# Patient Record
Sex: FEMALE | Race: WHITE | NOT HISPANIC OR LATINO | Employment: UNEMPLOYED | URBAN - METROPOLITAN AREA
[De-identification: names, ages, dates, MRNs, and addresses within clinical notes are randomized per-mention and may not be internally consistent; named-entity substitution may affect disease eponyms.]

---

## 2022-06-07 ENCOUNTER — OFFICE VISIT (OUTPATIENT)
Dept: FAMILY MEDICINE | Facility: CLINIC | Age: 27
End: 2022-06-07
Payer: OTHER GOVERNMENT

## 2022-06-07 VITALS
WEIGHT: 197 LBS | OXYGEN SATURATION: 98 % | HEIGHT: 62 IN | DIASTOLIC BLOOD PRESSURE: 58 MMHG | HEART RATE: 82 BPM | BODY MASS INDEX: 36.25 KG/M2 | SYSTOLIC BLOOD PRESSURE: 88 MMHG

## 2022-06-07 DIAGNOSIS — K21.9 GASTROESOPHAGEAL REFLUX DISEASE WITHOUT ESOPHAGITIS: ICD-10-CM

## 2022-06-07 DIAGNOSIS — Z90.49 S/P SMALL BOWEL RESECTION: ICD-10-CM

## 2022-06-07 DIAGNOSIS — K50.80 CROHN'S DISEASE OF BOTH SMALL AND LARGE INTESTINE WITHOUT COMPLICATION: Primary | ICD-10-CM

## 2022-06-07 DIAGNOSIS — F06.4 ANXIETY DISORDER DUE TO GENERAL MEDICAL CONDITION: ICD-10-CM

## 2022-06-07 DIAGNOSIS — N80.9 ENDOMETRIOSIS, UNSPECIFIED: ICD-10-CM

## 2022-06-07 PROBLEM — K50.90 CROHN'S DISEASE, UNSPECIFIED, WITHOUT COMPLICATIONS: Status: ACTIVE | Noted: 2022-06-07

## 2022-06-07 PROCEDURE — 99203 OFFICE O/P NEW LOW 30 MIN: CPT | Mod: S$GLB,,, | Performed by: FAMILY MEDICINE

## 2022-06-07 PROCEDURE — 99203 PR OFFICE/OUTPT VISIT, NEW, LEVL III, 30-44 MIN: ICD-10-PCS | Mod: S$GLB,,, | Performed by: FAMILY MEDICINE

## 2022-06-07 RX ORDER — SERTRALINE HYDROCHLORIDE 100 MG/1
100 TABLET, FILM COATED ORAL DAILY
Qty: 90 TABLET | Refills: 1 | Status: SHIPPED | OUTPATIENT
Start: 2022-06-07 | End: 2022-12-08

## 2022-06-07 RX ORDER — CALC/MAG/B COMPLEX/D3/HERB 61
15 TABLET ORAL DAILY
Qty: 90 CAPSULE | Refills: 1 | Status: SHIPPED | OUTPATIENT
Start: 2022-06-07 | End: 2022-06-13

## 2022-06-07 RX ORDER — KETOROLAC TROMETHAMINE 10 MG/1
10 TABLET, FILM COATED ORAL EVERY 6 HOURS
Qty: 20 TABLET | Refills: 0 | Status: SHIPPED | OUTPATIENT
Start: 2022-06-07 | End: 2022-06-12

## 2022-06-07 NOTE — PROGRESS NOTES
SUBJECTIVE:    Patient ID: Agnes Matos is a 27 y.o. female.    Chief Complaint: Establish Care, needs colonoscopy / recent resection, discuss returning endometriosis, and requests psych referral for continued care    Patient with h/o Chrons disease. She has allergic reaction to the biologicals to treat this. She has had colon resection 2022 for this. Also has allergy to Morphine. Nsaids help her pain . Transitioning out of active duty , she is medically retired and has just moved to [place]. She needs to establish with a new GI.   She does also have issues with endometriosis . Feels like she has been having flares. Has wanted hysterectomy but her gyn wouldn't perform due to her age   Has been having pain recently. She reports has 3/10 pain currently. She has had multiple intraabdominal surgeries. Has been having some mild changes in stools. Flares cause changes in appetite. She is able to tolerate liquids       Past Medical History:   Diagnosis Date    Anxiety     Cholestasis during pregnancy     Crohn's colitis     Depression     Endometriosis     GERD (gastroesophageal reflux disease)     IBS (irritable bowel syndrome)     TMJ (dislocation of temporomandibular joint)      Past Surgical History:   Procedure Laterality Date     SECTION  17 &     Two births    COLON SURGERY  21    A small part along with tbe small bowel    presacral neurectomy          SMALL INTESTINE SURGERY  2021    partial with partial large bowel    SURGICAL REMOVAL OF ENDOMETRIOSIS      TONSILLECTOMY  2006    TUBAL LIGATION  10-26-19    After c-secrtion     History reviewed. No pertinent family history.    Marital Status:   Alcohol History:  reports that she does not currently use alcohol.  Tobacco History:  reports that she has never smoked. She has never used smokeless tobacco.  Drug History:  reports no history of drug use.    Review of patient's allergies indicates:   Allergen Reactions  "   Opioids - morphine analogues Itching    Stelara [ustekinumab] Hives    Adalimumab Rash, Itching and Hives     joint ache      Renflexis [infliximab-abda] Rash     Other reaction(s): Rash or Itch, Other: joint ache, Rash or Itch, Other: joint ache       Current Outpatient Medications:     omeprazole (PRILOSEC) 20 MG capsule, Take 1 capsule (20 mg total) by mouth once daily., Disp: 30 capsule, Rfl: 11    sertraline (ZOLOFT) 100 MG tablet, Take 1 tablet (100 mg total) by mouth once daily. For mood, Disp: 90 tablet, Rfl: 1    Review of Systems   Constitutional:  Positive for appetite change. Negative for activity change, fatigue and unexpected weight change.   HENT:  Negative for hearing loss, postnasal drip, sinus pressure, sore throat and voice change.    Eyes:  Negative for photophobia and visual disturbance.   Respiratory:  Negative for cough, shortness of breath and wheezing.    Cardiovascular:  Negative for chest pain and palpitations.   Gastrointestinal:  Positive for abdominal pain and constipation. Negative for blood in stool, diarrhea and nausea.   Genitourinary:  Negative for difficulty urinating, frequency, hematuria and urgency.   Musculoskeletal:  Negative for arthralgias and back pain.   Skin:  Negative for rash.   Neurological:  Negative for weakness, light-headedness and headaches.   Hematological:  Negative for adenopathy. Does not bruise/bleed easily.   Psychiatric/Behavioral:  The patient is not nervous/anxious.         Objective:      Vitals:    06/07/22 1143   BP: (!) 88/58   Pulse: 82   SpO2: 98%   Weight: 89.4 kg (197 lb)   Height: 5' 2" (1.575 m)     Physical Exam  Constitutional:       Appearance: Normal appearance.   HENT:      Head: Normocephalic and atraumatic.      Mouth/Throat:      Mouth: Mucous membranes are moist.   Eyes:      Conjunctiva/sclera: Conjunctivae normal.   Pulmonary:      Effort: Pulmonary effort is normal.   Abdominal:      General: Abdomen is flat. There is no " distension.      Palpations: Abdomen is soft.      Tenderness: There is abdominal tenderness. There is no guarding.   Neurological:      General: No focal deficit present.      Mental Status: She is alert and oriented to person, place, and time.   Psychiatric:         Mood and Affect: Mood normal.         Behavior: Behavior normal.         Assessment:       1. Crohn's disease of both small and large intestine without complication    2. Endometriosis, unspecified    3. Anxiety disorder due to general medical condition    4. Gastroesophageal reflux disease without esophagitis    5. S/P small bowel resection         Plan:       Crohn's disease of both small and large intestine without complication  -     Ambulatory referral/consult to Gastroenterology; Future; Expected date: 06/14/2022  -     ketorolac (TORADOL) 10 mg tablet; Take 1 tablet (10 mg total) by mouth every 6 (six) hours. For acute pain for 5 days  Dispense: 20 tablet; Refill: 0  -     CBC Auto Differential; Future; Expected date: 06/07/2022  -     Comprehensive Metabolic Panel; Future; Expected date: 06/07/2022  -     Iron; Future; Expected date: 06/07/2022    Endometriosis, unspecified  -     Ambulatory referral/consult to Obstetrics / Gynecology; Future; Expected date: 06/14/2022    Anxiety disorder due to general medical condition  -     sertraline (ZOLOFT) 100 MG tablet; Take 1 tablet (100 mg total) by mouth once daily. For mood  Dispense: 90 tablet; Refill: 1  -     Ambulatory referral/consult to Psychology; Future; Expected date: 06/14/2022    Gastroesophageal reflux disease without esophagitis  -     Discontinue: lansoprazole (PREVACID) 15 MG capsule; Take 1 capsule (15 mg total) by mouth once daily. For gerd  Dispense: 90 capsule; Refill: 1    S/P small bowel resection  -     CBC Auto Differential; Future; Expected date: 06/07/2022  -     Comprehensive Metabolic Panel; Future; Expected date: 06/07/2022  -     Iron; Future; Expected date:  06/07/2022    Follow up in about 6 months (around 12/7/2022), or if symptoms worsen or fail to improve.

## 2022-06-08 LAB
ALBUMIN SERPL-MCNC: 4.6 G/DL (ref 3.6–5.1)
ALBUMIN/GLOB SERPL: 1.4 (CALC) (ref 1–2.5)
ALP SERPL-CCNC: 69 U/L (ref 31–125)
ALT SERPL-CCNC: 23 U/L (ref 6–29)
AST SERPL-CCNC: 19 U/L (ref 10–30)
BASOPHILS # BLD AUTO: 28 CELLS/UL (ref 0–200)
BASOPHILS NFR BLD AUTO: 0.4 %
BILIRUB SERPL-MCNC: 0.6 MG/DL (ref 0.2–1.2)
BUN SERPL-MCNC: 16 MG/DL (ref 7–25)
BUN/CREAT SERPL: NORMAL (CALC) (ref 6–22)
CALCIUM SERPL-MCNC: 9.8 MG/DL (ref 8.6–10.2)
CHLORIDE SERPL-SCNC: 101 MMOL/L (ref 98–110)
CO2 SERPL-SCNC: 27 MMOL/L (ref 20–32)
CREAT SERPL-MCNC: 0.76 MG/DL (ref 0.5–1.1)
EOSINOPHIL # BLD AUTO: 57 CELLS/UL (ref 15–500)
EOSINOPHIL NFR BLD AUTO: 0.8 %
ERYTHROCYTE [DISTWIDTH] IN BLOOD BY AUTOMATED COUNT: 12.8 % (ref 11–15)
GLOBULIN SER CALC-MCNC: 3.3 G/DL (CALC) (ref 1.9–3.7)
GLUCOSE SERPL-MCNC: 81 MG/DL (ref 65–99)
HCT VFR BLD AUTO: 40.6 % (ref 35–45)
HGB BLD-MCNC: 13.3 G/DL (ref 11.7–15.5)
IRON SERPL-MCNC: 91 MCG/DL (ref 40–190)
LYMPHOCYTES # BLD AUTO: 2435 CELLS/UL (ref 850–3900)
LYMPHOCYTES NFR BLD AUTO: 34.3 %
MCH RBC QN AUTO: 25.9 PG (ref 27–33)
MCHC RBC AUTO-ENTMCNC: 32.8 G/DL (ref 32–36)
MCV RBC AUTO: 79 FL (ref 80–100)
MONOCYTES # BLD AUTO: 454 CELLS/UL (ref 200–950)
MONOCYTES NFR BLD AUTO: 6.4 %
NEUTROPHILS # BLD AUTO: 4125 CELLS/UL (ref 1500–7800)
NEUTROPHILS NFR BLD AUTO: 58.1 %
PLATELET # BLD AUTO: 325 THOUSAND/UL (ref 140–400)
PMV BLD REES-ECKER: 9.1 FL (ref 7.5–12.5)
POTASSIUM SERPL-SCNC: 3.7 MMOL/L (ref 3.5–5.3)
PROT SERPL-MCNC: 7.9 G/DL (ref 6.1–8.1)
RBC # BLD AUTO: 5.14 MILLION/UL (ref 3.8–5.1)
SODIUM SERPL-SCNC: 137 MMOL/L (ref 135–146)
WBC # BLD AUTO: 7.1 THOUSAND/UL (ref 3.8–10.8)

## 2022-06-13 RX ORDER — OMEPRAZOLE 20 MG/1
20 CAPSULE, DELAYED RELEASE ORAL DAILY
Qty: 30 CAPSULE | Refills: 11 | Status: SHIPPED | OUTPATIENT
Start: 2022-06-13 | End: 2022-12-08

## 2022-06-20 ENCOUNTER — TELEPHONE (OUTPATIENT)
Dept: FAMILY MEDICINE | Facility: CLINIC | Age: 27
End: 2022-06-20

## 2022-06-20 DIAGNOSIS — K50.80 CROHN'S DISEASE OF BOTH SMALL AND LARGE INTESTINE WITHOUT COMPLICATION: Primary | ICD-10-CM

## 2022-06-20 NOTE — TELEPHONE ENCOUNTER
----- Message from Zoey Del Castillo sent at 6/20/2022  9:38 AM CDT -----  Dr. aSnchez is not accepting new patients. They need a new referral to Dr. Lee instead. Pt #[phone]

## 2022-12-08 ENCOUNTER — OFFICE VISIT (OUTPATIENT)
Dept: FAMILY MEDICINE | Facility: CLINIC | Age: 27
End: 2022-12-08
Payer: OTHER GOVERNMENT

## 2022-12-08 VITALS
SYSTOLIC BLOOD PRESSURE: 100 MMHG | DIASTOLIC BLOOD PRESSURE: 66 MMHG | OXYGEN SATURATION: 99 % | HEART RATE: 89 BPM | BODY MASS INDEX: 39.93 KG/M2 | WEIGHT: 217 LBS | HEIGHT: 62 IN

## 2022-12-08 DIAGNOSIS — Z3A.11 11 WEEKS GESTATION OF PREGNANCY: ICD-10-CM

## 2022-12-08 DIAGNOSIS — F06.4 ANXIETY DISORDER DUE TO GENERAL MEDICAL CONDITION: Primary | ICD-10-CM

## 2022-12-08 PROCEDURE — 99213 OFFICE O/P EST LOW 20 MIN: CPT | Mod: S$GLB,,, | Performed by: FAMILY MEDICINE

## 2022-12-08 PROCEDURE — 99213 PR OFFICE/OUTPT VISIT, EST, LEVL III, 20-29 MIN: ICD-10-PCS | Mod: S$GLB,,, | Performed by: FAMILY MEDICINE

## 2022-12-08 RX ORDER — SERTRALINE HYDROCHLORIDE 100 MG/1
100 TABLET, FILM COATED ORAL DAILY
Qty: 90 TABLET | Refills: 1 | Status: CANCELLED | OUTPATIENT
Start: 2022-12-08 | End: 2023-12-08

## 2022-12-08 NOTE — PROGRESS NOTES
SUBJECTIVE:    Patient ID: Agnes Matos is a 27 y.o. female.    Chief Complaint: 6M Check Up and 11 weeks pregnant      Patient with history of IBS and depressive disorder presents for her follow-up visit.  She has been on sertraline off and on for the past few months.  She recently found out that she is pregnant.  She is currently 11 weeks.  She has only been intermittently taking her sertraline.  She feels that she is in a good space mentally.  She has an appointment with her gyn on next week.  No GI flares.  She is already started taking prenatal vitamins.      Past Medical History:   Diagnosis Date    Anxiety     Cholestasis during pregnancy     Crohn's colitis     Depression     Endometriosis     GERD (gastroesophageal reflux disease)     IBS (irritable bowel syndrome)     TMJ (dislocation of temporomandibular joint)      Past Surgical History:   Procedure Laterality Date     SECTION  17 &     Two births    COLON SURGERY  21    A small part along with tbe small bowel    presacral neurectomy          SMALL INTESTINE SURGERY  2021    partial with partial large bowel    SURGICAL REMOVAL OF ENDOMETRIOSIS      TONSILLECTOMY      TUBAL LIGATION  10-26-19    After c-secrtion     History reviewed. No pertinent family history.    Marital Status:   Alcohol History:  reports that she does not currently use alcohol.  Tobacco History:  reports that she has never smoked. She has never used smokeless tobacco.  Drug History:  reports no history of drug use.    Review of patient's allergies indicates:   Allergen Reactions    Opioids - morphine analogues Itching    Stelara [ustekinumab] Hives    Adalimumab Rash, Itching and Hives     joint ache      Renflexis [infliximab-abda] Rash     Other reaction(s): Rash or Itch, Other: joint ache, Rash or Itch, Other: joint ache       Current Outpatient Medications:     prenatal 25/iron fum/folic/dha (PRENATAL-1 ORAL), Take by mouth Daily.,  "Disp: , Rfl:     Review of Systems   All other systems reviewed and are negative.       Objective:      Vitals:    12/08/22 1544   BP: 100/66   Pulse: 89   SpO2: 99%   Weight: 98.4 kg (217 lb)   Height: 5' 2" (1.575 m)     Physical Exam  Constitutional:       Appearance: Normal appearance.   HENT:      Head: Normocephalic and atraumatic.      Mouth/Throat:      Mouth: Mucous membranes are moist.   Eyes:      Conjunctiva/sclera: Conjunctivae normal.   Pulmonary:      Effort: Pulmonary effort is normal.   Neurological:      General: No focal deficit present.      Mental Status: She is alert and oriented to person, place, and time.   Psychiatric:         Mood and Affect: Mood normal.         Behavior: Behavior normal.         Assessment:       1. Anxiety disorder due to general medical condition    2. 11 weeks gestation of pregnancy           Plan:       Anxiety disorder due to general medical condition    11 weeks gestation of pregnancy    Patient to discontinue sertraline and Prilosec    Follow up if symptoms worsen or fail to improve.              "

## 2022-12-14 ENCOUNTER — HOSPITAL ENCOUNTER (EMERGENCY)
Facility: HOSPITAL | Age: 27
Discharge: ANOTHER HOSPITAL | End: 2022-12-15
Attending: EMERGENCY MEDICINE
Payer: OTHER GOVERNMENT

## 2022-12-14 DIAGNOSIS — N83.202 CYST OF LEFT OVARY: Primary | ICD-10-CM

## 2022-12-14 DIAGNOSIS — R10.9 FLANK PAIN: ICD-10-CM

## 2022-12-14 LAB
ALBUMIN SERPL BCP-MCNC: 3.6 G/DL (ref 3.5–5.2)
ALP SERPL-CCNC: 78 U/L (ref 55–135)
ALT SERPL W/O P-5'-P-CCNC: 34 U/L (ref 10–44)
ANION GAP SERPL CALC-SCNC: 12 MMOL/L (ref 8–16)
AST SERPL-CCNC: 18 U/L (ref 10–40)
B-HCG UR QL: POSITIVE
BASOPHILS # BLD AUTO: 0.02 K/UL (ref 0–0.2)
BASOPHILS NFR BLD: 0.1 % (ref 0–1.9)
BILIRUB SERPL-MCNC: 0.4 MG/DL (ref 0.1–1)
BILIRUB UR QL STRIP: NEGATIVE
BUN SERPL-MCNC: 8 MG/DL (ref 6–20)
CALCIUM SERPL-MCNC: 9.2 MG/DL (ref 8.7–10.5)
CHLORIDE SERPL-SCNC: 103 MMOL/L (ref 95–110)
CLARITY UR: CLEAR
CO2 SERPL-SCNC: 20 MMOL/L (ref 23–29)
COLOR UR: YELLOW
CREAT SERPL-MCNC: 0.6 MG/DL (ref 0.5–1.4)
DIFFERENTIAL METHOD: ABNORMAL
EOSINOPHIL # BLD AUTO: 0.1 K/UL (ref 0–0.5)
EOSINOPHIL NFR BLD: 0.4 % (ref 0–8)
ERYTHROCYTE [DISTWIDTH] IN BLOOD BY AUTOMATED COUNT: 13.2 % (ref 11.5–14.5)
EST. GFR  (NO RACE VARIABLE): >60 ML/MIN/1.73 M^2
GLUCOSE SERPL-MCNC: 96 MG/DL (ref 70–110)
GLUCOSE UR QL STRIP: NEGATIVE
HCG INTACT+B SERPL-ACNC: NORMAL MIU/ML
HCT VFR BLD AUTO: 38.8 % (ref 37–48.5)
HGB BLD-MCNC: 12.9 G/DL (ref 12–16)
HGB UR QL STRIP: NEGATIVE
IMM GRANULOCYTES # BLD AUTO: 0.08 K/UL (ref 0–0.04)
IMM GRANULOCYTES NFR BLD AUTO: 0.5 % (ref 0–0.5)
KETONES UR QL STRIP: NEGATIVE
LEUKOCYTE ESTERASE UR QL STRIP: NEGATIVE
LYMPHOCYTES # BLD AUTO: 2.1 K/UL (ref 1–4.8)
LYMPHOCYTES NFR BLD: 14.2 % (ref 18–48)
MCH RBC QN AUTO: 26.5 PG (ref 27–31)
MCHC RBC AUTO-ENTMCNC: 33.2 G/DL (ref 32–36)
MCV RBC AUTO: 80 FL (ref 82–98)
MONOCYTES # BLD AUTO: 0.7 K/UL (ref 0.3–1)
MONOCYTES NFR BLD: 4.7 % (ref 4–15)
NEUTROPHILS # BLD AUTO: 11.9 K/UL (ref 1.8–7.7)
NEUTROPHILS NFR BLD: 80.1 % (ref 38–73)
NITRITE UR QL STRIP: NEGATIVE
NRBC BLD-RTO: 0 /100 WBC
PH UR STRIP: 6 [PH] (ref 5–8)
PLATELET # BLD AUTO: 318 K/UL (ref 150–450)
PMV BLD AUTO: 8.6 FL (ref 9.2–12.9)
POTASSIUM SERPL-SCNC: 3.9 MMOL/L (ref 3.5–5.1)
PROT SERPL-MCNC: 8 G/DL (ref 6–8.4)
PROT UR QL STRIP: NEGATIVE
RBC # BLD AUTO: 4.87 M/UL (ref 4–5.4)
SODIUM SERPL-SCNC: 135 MMOL/L (ref 136–145)
SP GR UR STRIP: 1.02 (ref 1–1.03)
URN SPEC COLLECT METH UR: NORMAL
UROBILINOGEN UR STRIP-ACNC: NEGATIVE EU/DL
WBC # BLD AUTO: 14.82 K/UL (ref 3.9–12.7)

## 2022-12-14 PROCEDURE — 63600175 PHARM REV CODE 636 W HCPCS: Performed by: EMERGENCY MEDICINE

## 2022-12-14 PROCEDURE — 36415 COLL VENOUS BLD VENIPUNCTURE: CPT | Performed by: EMERGENCY MEDICINE

## 2022-12-14 PROCEDURE — 81025 URINE PREGNANCY TEST: CPT | Performed by: EMERGENCY MEDICINE

## 2022-12-14 PROCEDURE — 99285 EMERGENCY DEPT VISIT HI MDM: CPT | Mod: 25

## 2022-12-14 PROCEDURE — 80053 COMPREHEN METABOLIC PANEL: CPT | Performed by: EMERGENCY MEDICINE

## 2022-12-14 PROCEDURE — 84702 CHORIONIC GONADOTROPIN TEST: CPT | Performed by: EMERGENCY MEDICINE

## 2022-12-14 PROCEDURE — U0002 COVID-19 LAB TEST NON-CDC: HCPCS | Performed by: EMERGENCY MEDICINE

## 2022-12-14 PROCEDURE — 81003 URINALYSIS AUTO W/O SCOPE: CPT | Performed by: EMERGENCY MEDICINE

## 2022-12-14 PROCEDURE — 85025 COMPLETE CBC W/AUTO DIFF WBC: CPT | Performed by: EMERGENCY MEDICINE

## 2022-12-14 PROCEDURE — 96374 THER/PROPH/DIAG INJ IV PUSH: CPT

## 2022-12-14 RX ORDER — FENTANYL CITRATE 50 UG/ML
50 INJECTION, SOLUTION INTRAMUSCULAR; INTRAVENOUS
Status: COMPLETED | OUTPATIENT
Start: 2022-12-14 | End: 2022-12-14

## 2022-12-14 RX ADMIN — FENTANYL CITRATE 50 MCG: 50 INJECTION, SOLUTION INTRAMUSCULAR; INTRAVENOUS at 11:12

## 2022-12-15 ENCOUNTER — TELEPHONE (OUTPATIENT)
Dept: FAMILY MEDICINE | Facility: CLINIC | Age: 27
End: 2022-12-15

## 2022-12-15 VITALS
HEIGHT: 63 IN | OXYGEN SATURATION: 100 % | WEIGHT: 214 LBS | TEMPERATURE: 98 F | DIASTOLIC BLOOD PRESSURE: 62 MMHG | BODY MASS INDEX: 37.92 KG/M2 | HEART RATE: 95 BPM | SYSTOLIC BLOOD PRESSURE: 112 MMHG | RESPIRATION RATE: 18 BRPM

## 2022-12-15 PROBLEM — N83.202 LEFT OVARIAN CYST: Status: ACTIVE | Noted: 2022-12-15

## 2022-12-15 PROBLEM — Z3A.11 11 WEEKS GESTATION OF PREGNANCY: Status: ACTIVE | Noted: 2022-12-15

## 2022-12-15 LAB — SARS-COV-2 RDRP RESP QL NAA+PROBE: NEGATIVE

## 2022-12-15 PROCEDURE — 96376 TX/PRO/DX INJ SAME DRUG ADON: CPT

## 2022-12-15 PROCEDURE — 63600175 PHARM REV CODE 636 W HCPCS

## 2022-12-15 RX ORDER — FENTANYL CITRATE 50 UG/ML
INJECTION, SOLUTION INTRAMUSCULAR; INTRAVENOUS
Status: COMPLETED
Start: 2022-12-15 | End: 2022-12-15

## 2022-12-15 RX ORDER — FENTANYL CITRATE 50 UG/ML
50 INJECTION, SOLUTION INTRAMUSCULAR; INTRAVENOUS
Status: COMPLETED | OUTPATIENT
Start: 2022-12-15 | End: 2022-12-15

## 2022-12-15 RX ADMIN — FENTANYL CITRATE 50 MCG: 50 INJECTION, SOLUTION INTRAMUSCULAR; INTRAVENOUS at 12:12

## 2022-12-15 NOTE — ED PROVIDER NOTES
Encounter Date: 2022    SCRIBE #1 NOTE: I, Mandy Carolina, am scribing for, and in the presence of,  Arturo Knutson MD.     History     Chief Complaint   Patient presents with    Flank Pain     Left sided flank pain started 20 minutes pta       Time seen by provider: 10:33 PM on 2022    Agnes Matos is a 27 y.o. female A0 who presents to the ED at ~11 weeks gestation with an onset of left flank pain that began 20 minutes PTA. Patient was lying in bed when significant other shifted, at which time, patient felt sudden sharp pain in left flank. Pain has persisted since radiating to left pelvis and worsening with movement. She denies prior episode of similar pain and states pain is different from prior round ligament pain she has had with other pregnancies. The patient denies burning or pain with urination, N/V, vaginal bleeding or discharge, or any other symptoms at this time. Last confirmatory US was normal. She has 12 week prenatal appointment next week (22). PMHx of Crohn's colitis, IBS, GERD, endometriosis. PSHx of C section, colon surgery, small intestine surgery, tubal ligation, surgical removal of endometriosis, and presacral neurectomy.    The history is provided by the patient.   Review of patient's allergies indicates:   Allergen Reactions    Opioids - morphine analogues Itching    Stelara [ustekinumab] Hives    Adalimumab Rash, Itching and Hives     joint ache      Renflexis [infliximab-abda] Rash     Other reaction(s): Rash or Itch, Other: joint ache, Rash or Itch, Other: joint ache     Past Medical History:   Diagnosis Date    Anxiety     Cholestasis during pregnancy     Crohn's colitis     Depression     Endometriosis     GERD (gastroesophageal reflux disease)     IBS (irritable bowel syndrome)     TMJ (dislocation of temporomandibular joint)      Past Surgical History:   Procedure Laterality Date     SECTION  17 &     Two births    COLON SURGERY  21    A small  part along with tbe small bowel    presacral neurectomy      2020    SMALL INTESTINE SURGERY  09/21/2021    partial with partial large bowel    SURGICAL REMOVAL OF ENDOMETRIOSIS      TONSILLECTOMY  2006    TUBAL LIGATION  10-26-19    After c-secrtion     History reviewed. No pertinent family history.  Social History     Tobacco Use    Smoking status: Never    Smokeless tobacco: Never   Substance Use Topics    Alcohol use: Not Currently     Comment: Social once every six months    Drug use: Never     Review of Systems   Constitutional:  Negative for appetite change.   HENT:  Negative for facial swelling.    Eyes:  Negative for itching.   Respiratory:  Negative for apnea and stridor.    Gastrointestinal:  Negative for abdominal distention, nausea and vomiting.   Genitourinary:  Positive for flank pain and pelvic pain. Negative for dysuria and enuresis.   Musculoskeletal:  Negative for neck stiffness.   Skin:  Negative for color change.   Neurological:  Negative for tremors.   Hematological:  Does not bruise/bleed easily.   Psychiatric/Behavioral:  Negative for decreased concentration.    All other systems reviewed and are negative.    Physical Exam     Initial Vitals [12/14/22 2209]   BP Pulse Resp Temp SpO2   (!) 92/51 99 18 98.4 °F (36.9 °C) 99 %      MAP       --         Physical Exam    Nursing note and vitals reviewed.  Constitutional: No distress.   HENT:   Head: Normocephalic.   Nose: Nose normal.   Eyes: Right eye exhibits no discharge. Left eye exhibits no discharge.   Cardiovascular:  Normal rate.           Pulmonary/Chest: No stridor. No respiratory distress.   Abdominal: She exhibits no distension.   No CVA tenderness, severe tenderness to palpation with guarding to left lower quadrant     Neurological: She is alert.   Skin: Skin is warm and dry.   Psychiatric: She has a normal mood and affect.       ED Course   Procedures  Labs Reviewed   URINALYSIS   CBC W/ AUTO DIFFERENTIAL   COMPREHENSIVE METABOLIC  PANEL   HCG, QUANTITATIVE   POCT URINE PREGNANCY          Imaging Results              US Retroperitoneal Complete (In process)    Procedure changed from US Retroperitoneal Limited                    Medications   fentaNYL 50 mcg/mL injection 50 mcg (has no administration in time range)     Medical Decision Making:   History:   Old Medical Records: I decided to obtain old medical records.  Initial Assessment:   A/P:  Left ovary enlarged without evidence of arterial flow, free fluid in the pelvis, possible fluid collection inferior to placenta concerning for placental abruption versus placenta previa.  Plan for screening labs, pain control, transfer to [place] as this is where her obstetrician practices.  Clinical Tests:   Lab Tests: Reviewed and Ordered  Radiological Study: Ordered and Reviewed        Scribe Attestation:   Scribe #1: I performed the above scribed service and the documentation accurately describes the services I performed. I attest to the accuracy of the note.            I, Dr. Arturo Knutson, personally performed the services described in this documentation. All medical record entries made by the scribe were at my direction and in my presence.  I have reviewed the chart and agree that the record reflects my personal performance and is accurate and complete. Arturo Knutson MD.  12:09 AM 12/15/2022         Clinical Impression:   Final diagnoses:  [R10.9] Flank pain               Arturo Knutson MD  12/15/22 0010

## 2022-12-15 NOTE — TELEPHONE ENCOUNTER
----- Message from Ifeoma Benson MA sent at 12/15/2022  3:32 PM CST -----  Regarding: hfu appt needed  Lizabeth from [facility] calling to schedule a hfu for the pt because the pt is discharging today. # [phone]

## 2022-12-15 NOTE — TELEPHONE ENCOUNTER
Spoke with patient states ovary removed.  Will f/u with gyn on Tuesday.  Informed patient to call if needed.  Patient verbalized understanding -DN

## 2022-12-15 NOTE — ED NOTES
Report given to ED RN at [facility]. All questions addressed and answered. Denies concerns/needs. Pending COVID test. STAT transport arranged via transfer center. Pt UTD on POC. PIV SL. Remains on CCM, VSS. WCTM.

## 2022-12-18 ENCOUNTER — HOSPITAL ENCOUNTER (EMERGENCY)
Facility: HOSPITAL | Age: 27
Discharge: HOME | End: 2022-12-18
Attending: EMERGENCY MEDICINE
Payer: OTHER GOVERNMENT

## 2022-12-18 VITALS
TEMPERATURE: 98 F | WEIGHT: 214 LBS | HEIGHT: 63 IN | OXYGEN SATURATION: 100 % | RESPIRATION RATE: 20 BRPM | DIASTOLIC BLOOD PRESSURE: 69 MMHG | SYSTOLIC BLOOD PRESSURE: 118 MMHG | BODY MASS INDEX: 37.92 KG/M2 | HEART RATE: 97 BPM

## 2022-12-18 DIAGNOSIS — O20.0 THREATENED MISCARRIAGE: Primary | ICD-10-CM

## 2022-12-18 DIAGNOSIS — N83.202 CYST OF LEFT OVARY: ICD-10-CM

## 2022-12-18 DIAGNOSIS — R52 PAIN: ICD-10-CM

## 2022-12-18 LAB
ALBUMIN SERPL BCP-MCNC: 3.1 G/DL (ref 3.5–5.2)
ALP SERPL-CCNC: 90 U/L (ref 55–135)
ALT SERPL W/O P-5'-P-CCNC: 16 U/L (ref 10–44)
ANION GAP SERPL CALC-SCNC: 11 MMOL/L (ref 8–16)
AST SERPL-CCNC: 13 U/L (ref 10–40)
B-HCG UR QL: POSITIVE
BASOPHILS # BLD AUTO: 0.01 K/UL (ref 0–0.2)
BASOPHILS NFR BLD: 0.1 % (ref 0–1.9)
BILIRUB SERPL-MCNC: 1 MG/DL (ref 0.1–1)
BILIRUB UR QL STRIP: NEGATIVE
BUN SERPL-MCNC: 8 MG/DL (ref 6–20)
CALCIUM SERPL-MCNC: 8.9 MG/DL (ref 8.7–10.5)
CHLORIDE SERPL-SCNC: 93 MMOL/L (ref 95–110)
CLARITY UR: CLEAR
CO2 SERPL-SCNC: 25 MMOL/L (ref 23–29)
COLOR UR: YELLOW
CREAT SERPL-MCNC: 0.5 MG/DL (ref 0.5–1.4)
CTP QC/QA: YES
DIFFERENTIAL METHOD: ABNORMAL
EOSINOPHIL # BLD AUTO: 0 K/UL (ref 0–0.5)
EOSINOPHIL NFR BLD: 0.4 % (ref 0–8)
ERYTHROCYTE [DISTWIDTH] IN BLOOD BY AUTOMATED COUNT: 13.2 % (ref 11.5–14.5)
EST. GFR  (NO RACE VARIABLE): >60 ML/MIN/1.73 M^2
GLUCOSE SERPL-MCNC: 98 MG/DL (ref 70–110)
GLUCOSE UR QL STRIP: NEGATIVE
HCT VFR BLD AUTO: 35.2 % (ref 37–48.5)
HGB BLD-MCNC: 11.7 G/DL (ref 12–16)
HGB UR QL STRIP: NEGATIVE
IMM GRANULOCYTES # BLD AUTO: 0.05 K/UL (ref 0–0.04)
IMM GRANULOCYTES NFR BLD AUTO: 0.5 % (ref 0–0.5)
KETONES UR QL STRIP: ABNORMAL
LEUKOCYTE ESTERASE UR QL STRIP: NEGATIVE
LIPASE SERPL-CCNC: 33 U/L (ref 4–60)
LYMPHOCYTES # BLD AUTO: 0.9 K/UL (ref 1–4.8)
LYMPHOCYTES NFR BLD: 8.1 % (ref 18–48)
MCH RBC QN AUTO: 25.8 PG (ref 27–31)
MCHC RBC AUTO-ENTMCNC: 33.2 G/DL (ref 32–36)
MCV RBC AUTO: 78 FL (ref 82–98)
MONOCYTES # BLD AUTO: 0.9 K/UL (ref 0.3–1)
MONOCYTES NFR BLD: 8.6 % (ref 4–15)
NEUTROPHILS # BLD AUTO: 8.9 K/UL (ref 1.8–7.7)
NEUTROPHILS NFR BLD: 82.3 % (ref 38–73)
NITRITE UR QL STRIP: NEGATIVE
NRBC BLD-RTO: 0 /100 WBC
PH UR STRIP: 7 [PH] (ref 5–8)
PLATELET # BLD AUTO: 327 K/UL (ref 150–450)
PMV BLD AUTO: 9.1 FL (ref 9.2–12.9)
POTASSIUM SERPL-SCNC: 2.9 MMOL/L (ref 3.5–5.1)
PROT SERPL-MCNC: 7.6 G/DL (ref 6–8.4)
PROT UR QL STRIP: NEGATIVE
RBC # BLD AUTO: 4.54 M/UL (ref 4–5.4)
SODIUM SERPL-SCNC: 129 MMOL/L (ref 136–145)
SP GR UR STRIP: 1 (ref 1–1.03)
URN SPEC COLLECT METH UR: ABNORMAL
UROBILINOGEN UR STRIP-ACNC: NEGATIVE EU/DL
WBC # BLD AUTO: 10.84 K/UL (ref 3.9–12.7)

## 2022-12-18 PROCEDURE — 81003 URINALYSIS AUTO W/O SCOPE: CPT | Performed by: NURSE PRACTITIONER

## 2022-12-18 PROCEDURE — 96374 THER/PROPH/DIAG INJ IV PUSH: CPT | Mod: 59

## 2022-12-18 PROCEDURE — 96361 HYDRATE IV INFUSION ADD-ON: CPT | Mod: 59

## 2022-12-18 PROCEDURE — 85025 COMPLETE CBC W/AUTO DIFF WBC: CPT | Performed by: NURSE PRACTITIONER

## 2022-12-18 PROCEDURE — 83690 ASSAY OF LIPASE: CPT | Performed by: NURSE PRACTITIONER

## 2022-12-18 PROCEDURE — 99285 EMERGENCY DEPT VISIT HI MDM: CPT | Mod: 25

## 2022-12-18 PROCEDURE — 63600175 PHARM REV CODE 636 W HCPCS: Performed by: EMERGENCY MEDICINE

## 2022-12-18 PROCEDURE — 51701 INSERT BLADDER CATHETER: CPT

## 2022-12-18 PROCEDURE — 81025 URINE PREGNANCY TEST: CPT | Performed by: NURSE PRACTITIONER

## 2022-12-18 PROCEDURE — 25000003 PHARM REV CODE 250: Performed by: EMERGENCY MEDICINE

## 2022-12-18 PROCEDURE — 80053 COMPREHEN METABOLIC PANEL: CPT | Performed by: NURSE PRACTITIONER

## 2022-12-18 RX ORDER — OXYCODONE AND ACETAMINOPHEN 5; 325 MG/1; MG/1
1 TABLET ORAL EVERY 4 HOURS PRN
Qty: 8 TABLET | Refills: 0 | Status: ON HOLD | OUTPATIENT
Start: 2022-12-18 | End: 2022-12-24 | Stop reason: HOSPADM

## 2022-12-18 RX ORDER — HYDROMORPHONE HYDROCHLORIDE 1 MG/ML
1 INJECTION, SOLUTION INTRAMUSCULAR; INTRAVENOUS; SUBCUTANEOUS
Status: COMPLETED | OUTPATIENT
Start: 2022-12-18 | End: 2022-12-18

## 2022-12-18 RX ORDER — POTASSIUM CHLORIDE 750 MG/1
50 CAPSULE, EXTENDED RELEASE ORAL ONCE
Status: COMPLETED | OUTPATIENT
Start: 2022-12-18 | End: 2022-12-18

## 2022-12-18 RX ADMIN — SODIUM CHLORIDE, SODIUM LACTATE, POTASSIUM CHLORIDE, AND CALCIUM CHLORIDE 1000 ML: .6; .31; .03; .02 INJECTION, SOLUTION INTRAVENOUS at 02:12

## 2022-12-18 RX ADMIN — SODIUM CHLORIDE, SODIUM LACTATE, POTASSIUM CHLORIDE, AND CALCIUM CHLORIDE 1000 ML: .6; .31; .03; .02 INJECTION, SOLUTION INTRAVENOUS at 03:12

## 2022-12-18 RX ADMIN — POTASSIUM CHLORIDE 50 MEQ: 750 CAPSULE, EXTENDED RELEASE ORAL at 03:12

## 2022-12-18 RX ADMIN — HYDROMORPHONE HYDROCHLORIDE 1 MG: 1 INJECTION, SOLUTION INTRAMUSCULAR; INTRAVENOUS; SUBCUTANEOUS at 02:12

## 2022-12-18 NOTE — DISCHARGE INSTRUCTIONS
Follow-up with your OBGYN tomorrow.  Rest.  Return to emergency department for worsening symptoms or any problems.  Take pain medication only if absolutely necessary.  Drink lots of fluids.

## 2022-12-18 NOTE — ED PROVIDER NOTES
Encounter Date: 2022       History     Chief Complaint   Patient presents with    Abdominal Pain     Dxd w/ L ovarian cyst dcd from [facility] Thursday. Pain medication ran out 8 hours ago. Pt is 12 weeks pregnant.      27-year-old female who is 12 weeks pregnant presented emergency department with abdominal pain.  Patient was recently seen at [facility] and admitted and treated.  Patient had 2 ultrasound which showed left ovarian cyst and also intrauterine pregnancy.  There was a question of possible torsion so patient was observed in the hospital.  Patient has since then was discharged home and now here as pain medication ran out and now feeling pain.  Denies fever or chills or nausea vomiting or vaginal bleeding or discharge.  Denies any weakness or numbness.  Patient describes the abdominal pain as moderate to severe and pain resolves when she takes pain medication but returns in a few hours after that.  Describes this as constant pain mostly in the left lower abdomen where she has her cyst and describes this as gradual onset pain ongoing for at least 4-5 days    Review of patient's allergies indicates:   Allergen Reactions    Opioids - morphine analogues Itching    Stelara [ustekinumab] Hives    Adalimumab Rash, Itching and Hives     joint ache      Renflexis [infliximab-abda] Rash     Other reaction(s): Rash or Itch, Other: joint ache, Rash or Itch, Other: joint ache     Past Medical History:   Diagnosis Date    Anxiety     Cholestasis during pregnancy     Crohn's colitis     Depression     Endometriosis     GERD (gastroesophageal reflux disease)     IBS (irritable bowel syndrome)     TMJ (dislocation of temporomandibular joint)      Past Surgical History:   Procedure Laterality Date     SECTION  17 &     Two births    COLON SURGERY  21    A small part along with tbe small bowel    presacral neurectomy          SMALL INTESTINE SURGERY  2021    partial  with partial large bowel    SURGICAL REMOVAL OF ENDOMETRIOSIS      TONSILLECTOMY  2006    TUBAL LIGATION  10-26-19    After c-secrtion     No family history on file.  Social History     Tobacco Use    Smoking status: Never    Smokeless tobacco: Never   Substance Use Topics    Alcohol use: Not Currently     Comment: Social once every six months    Drug use: Never     Review of Systems   Constitutional: Negative.    HENT: Negative.     Eyes: Negative.    Respiratory: Negative.     Cardiovascular: Negative.  Negative for chest pain.   Gastrointestinal:  Positive for abdominal pain.   Endocrine: Negative.    Genitourinary: Negative.    Musculoskeletal: Negative.    Skin: Negative.    Allergic/Immunologic: Negative.    Neurological: Negative.    Hematological: Negative.    Psychiatric/Behavioral: Negative.     All other systems reviewed and are negative.    Physical Exam     Initial Vitals [12/18/22 1236]   BP Pulse Resp Temp SpO2   136/83 100 17 98.3 °F (36.8 °C) 98 %      MAP       --         Physical Exam    Nursing note and vitals reviewed.  Constitutional: She appears well-developed and well-nourished.   HENT:   Head: Normocephalic and atraumatic.   Nose: Nose normal.   Mouth/Throat: Oropharynx is clear and moist.   Eyes: Conjunctivae and EOM are normal. Pupils are equal, round, and reactive to light.   Neck: Neck supple. No thyromegaly present. No tracheal deviation present. No JVD present.   Normal range of motion.  Cardiovascular:  Normal rate, regular rhythm, normal heart sounds and intact distal pulses.           No murmur heard.  Pulmonary/Chest: Breath sounds normal. No stridor. No respiratory distress. She has no wheezes. She has no rales.   Abdominal: Abdomen is soft. Bowel sounds are normal. There is abdominal tenderness.   Diffuse abdominal tenderness   Musculoskeletal:         General: No edema. Normal range of motion.      Cervical back: Normal range of motion and neck supple.     Neurological: She is  alert and oriented to person, place, and time.   Skin: Skin is warm. Capillary refill takes less than 2 seconds.   Psychiatric: She has a normal mood and affect. Thought content normal.       ED Course   Procedures  Labs Reviewed   CBC W/ AUTO DIFFERENTIAL - Abnormal; Notable for the following components:       Result Value    Hemoglobin 11.7 (*)     Hematocrit 35.2 (*)     MCV 78 (*)     MCH 25.8 (*)     MPV 9.1 (*)     Gran # (ANC) 8.9 (*)     Immature Grans (Abs) 0.05 (*)     Lymph # 0.9 (*)     Gran % 82.3 (*)     Lymph % 8.1 (*)     All other components within normal limits    Narrative:     For upper or mid abdominal pain.   COMPREHENSIVE METABOLIC PANEL - Abnormal; Notable for the following components:    Sodium 129 (*)     Potassium 2.9 (*)     Chloride 93 (*)     Albumin 3.1 (*)     All other components within normal limits    Narrative:     For upper or mid abdominal pain.  potassium critical result(s) repeated. Called and verbal readback   obtained from [facility], RN by ROBERTO 12/18/2022 14:17   URINALYSIS, REFLEX TO URINE CULTURE - Abnormal; Notable for the following components:    Ketones, UA Trace (*)     All other components within normal limits    Narrative:     In and Out Cath as needed it patient unable to void  Specimen Source->Urine   POCT URINE PREGNANCY - Abnormal; Notable for the following components:    POC Preg Test, Ur Positive (*)     All other components within normal limits   LIPASE    Narrative:     For upper or mid abdominal pain.          Imaging Results              US OB <14 Wks, TransAbd, Single Gestation (Final result)  Result time 12/18/22 16:22:37      Final result by Alexander Schaffer MD (12/18/22 16:22:37)                   Narrative:    OB ultrasound less than 14 weeks    CLINICAL DATA: Pelvic pain, pregnancy    FINDINGS: Sonographic assessment of the pelvis was performed utilizing transabdominal technique. Comparison is made to December 15.    Intrauterine  gestational sac and living intrauterine fetus are noted mean sac diameter is 6.4 cm. Crown-rump length is 6.1 cm, corresponding with gestational age of 12 weeks 4 days. Fetal cardiac activity is noted, with average heart rate of 173 bpm. Subchorionic fluid collections are noted, the largest measuring 4.3 x 0.9 x 4.3 cm, consistent with implantational hemorrhage is    There is a small amount of simple appearing right adnexal free fluid. 3.3 cm complex cystic mass in the left ovary is likely a corpus luteal cyst. This is similar in appearance to December 15.    IMPRESSION:  1. Living intrauterine pregnancy. By crown-rump length assessment, estimated gestational age is 12 weeks 4 days.  2. Small implantational hemorrhage.  3. Complex left ovarian cyst, likely a corpus luteal cyst  4. Small amount of simple pelvic free fluid.  5. Above findings are similar in appearance to December 15.    Electronically signed by:  Alexander Schaffer MD  12/18/2022 4:22 PM [facility] Workstation: [id number]                                     Medications   lactated ringers bolus 1,000 mL (0 mLs Intravenous Stopped 12/18/22 1552)   HYDROmorphone injection 1 mg (1 mg Intravenous Given 12/18/22 1453)   potassium chloride CR capsule 50 mEq (50 mEq Oral Given 12/18/22 1547)   lactated ringers bolus 1,000 mL (0 mLs Intravenous Stopped 12/18/22 1647)     Medical Decision Making:   Differential Diagnosis:   27-year-old female presented emergency department with left lower abdominal pain likely secondary to ovarian cyst pain which she has been having over the past few days.  Ultrasound shows no evidence of torsion.  Intrauterine pregnancy detected.  Patient's pain control.  Discharged with instructions to follow-up with her OBGYN tomorrow.  Advised that narcotics are category C in pregnancy and to only use it if absolutely needed however patient states she would need to use it at this point so discharged with prescription with few tablets of Percocet.   Discharged with return precautions and instructions to follow-up with OBGYN tomorrow  Clinical Tests:   Lab Tests: Reviewed  Radiological Study: Reviewed                        Clinical Impression:   Final diagnoses:  [R52] Pain  [O20.0] Threatened miscarriage (Primary)  [N83.202] Cyst of left ovary        ED Disposition Condition    Discharge Stable          ED Prescriptions       Medication Sig Dispense Start Date End Date Auth. Provider    oxyCODONE-acetaminophen (PERCOCET) 5-325 mg per tablet Take 1 tablet by mouth every 4 (four) hours as needed for Pain. 8 tablet 12/18/2022 -- Garfield Tian MD          Follow-up Information       Follow up With Specialties Details Why Contact Info    Rose Torres MD Family Medicine In 1 day  [address]  [address]  [place] [address]  [phone]               Garfield Tian MD  12/18/22 0300

## 2022-12-18 NOTE — ED NOTES
Fluids and Rx given, pt. Seemed to get a good bit of relief, resting comfortably, US at the bedside

## 2022-12-20 ENCOUNTER — TELEPHONE (OUTPATIENT)
Dept: FAMILY MEDICINE | Facility: CLINIC | Age: 27
End: 2022-12-20

## 2022-12-20 DIAGNOSIS — Z33.1 PREGNANT STATE, INCIDENTAL: Primary | ICD-10-CM

## 2022-12-20 PROBLEM — Z3A.12 12 WEEKS GESTATION OF PREGNANCY: Status: ACTIVE | Noted: 2022-12-20

## 2022-12-20 PROBLEM — K50.919: Status: ACTIVE | Noted: 2022-12-20

## 2022-12-20 PROBLEM — R10.0 ACUTE ABDOMEN: Status: ACTIVE | Noted: 2022-12-20

## 2022-12-20 NOTE — TELEPHONE ENCOUNTER
----- Message from Amy Sarah sent at 12/20/2022  8:11 AM CST -----  Contact: Britney Curiel office requesting a referral for care. Patient has [employer] insurance. Office number [phone]- Britney

## 2022-12-23 PROBLEM — K65.9 PERITONITIS: Status: ACTIVE | Noted: 2022-12-23

## 2023-01-23 LAB
ABO + RH BLD: NORMAL
ANTIBODY SCREEN: NEGATIVE
HBV SURFACE AG SERPL QL IA: NEGATIVE
RUBELLA IMMUNE STATUS: NORMAL
T PALLIDUM AB SER QL: NONREACTIVE

## 2023-01-25 ENCOUNTER — HOSPITAL ENCOUNTER (EMERGENCY)
Facility: HOSPITAL | Age: 28
Discharge: HOME | End: 2023-01-25
Attending: EMERGENCY MEDICINE
Payer: OTHER GOVERNMENT

## 2023-01-25 ENCOUNTER — TELEPHONE (OUTPATIENT)
Dept: EMERGENCY MEDICINE | Facility: HOSPITAL | Age: 28
End: 2023-01-25

## 2023-01-25 VITALS
OXYGEN SATURATION: 99 % | DIASTOLIC BLOOD PRESSURE: 58 MMHG | TEMPERATURE: 99 F | HEART RATE: 84 BPM | BODY MASS INDEX: 36.32 KG/M2 | HEIGHT: 63 IN | SYSTOLIC BLOOD PRESSURE: 110 MMHG | WEIGHT: 205 LBS | RESPIRATION RATE: 18 BRPM

## 2023-01-25 DIAGNOSIS — R10.9 FLANK PAIN: Primary | ICD-10-CM

## 2023-01-25 DIAGNOSIS — R10.9 ABDOMINAL PAIN: ICD-10-CM

## 2023-01-25 LAB
ABO GROUP BLD: NORMAL
ALBUMIN SERPL BCP-MCNC: 2.9 G/DL (ref 3.5–5.2)
ALP SERPL-CCNC: 189 U/L (ref 55–135)
ALT SERPL W/O P-5'-P-CCNC: 23 U/L (ref 10–44)
ANION GAP SERPL CALC-SCNC: 9 MMOL/L (ref 8–16)
AST SERPL-CCNC: 26 U/L (ref 10–40)
BACTERIA #/AREA URNS HPF: ABNORMAL /HPF
BASOPHILS # BLD AUTO: 0.02 K/UL (ref 0–0.2)
BASOPHILS NFR BLD: 0.2 % (ref 0–1.9)
BILIRUB SERPL-MCNC: 0.7 MG/DL (ref 0.1–1)
BILIRUB UR QL STRIP: NEGATIVE
BILIRUB UR QL STRIP: NEGATIVE
BUN SERPL-MCNC: 9 MG/DL (ref 6–20)
CALCIUM SERPL-MCNC: 9 MG/DL (ref 8.7–10.5)
CAOX CRY URNS QL MICRO: ABNORMAL
CHLORIDE SERPL-SCNC: 100 MMOL/L (ref 95–110)
CLARITY UR: ABNORMAL
CLARITY UR: ABNORMAL
CO2 SERPL-SCNC: 24 MMOL/L (ref 23–29)
COLOR UR: YELLOW
COLOR UR: YELLOW
CREAT SERPL-MCNC: 0.6 MG/DL (ref 0.5–1.4)
DIFFERENTIAL METHOD: ABNORMAL
EOSINOPHIL # BLD AUTO: 0 K/UL (ref 0–0.5)
EOSINOPHIL NFR BLD: 0.4 % (ref 0–8)
ERYTHROCYTE [DISTWIDTH] IN BLOOD BY AUTOMATED COUNT: 14.5 % (ref 11.5–14.5)
EST. GFR  (NO RACE VARIABLE): >60 ML/MIN/1.73 M^2
GLUCOSE SERPL-MCNC: 85 MG/DL (ref 70–110)
GLUCOSE UR QL STRIP: NEGATIVE
GLUCOSE UR QL STRIP: NEGATIVE
HCG INTACT+B SERPL-ACNC: NORMAL MIU/ML
HCT VFR BLD AUTO: 33.8 % (ref 37–48.5)
HGB BLD-MCNC: 10.8 G/DL (ref 12–16)
HGB UR QL STRIP: ABNORMAL
HGB UR QL STRIP: ABNORMAL
HYALINE CASTS #/AREA URNS LPF: 50 /LPF
IMM GRANULOCYTES # BLD AUTO: 0.1 K/UL (ref 0–0.04)
IMM GRANULOCYTES NFR BLD AUTO: 0.9 % (ref 0–0.5)
KETONES UR QL STRIP: NEGATIVE
KETONES UR QL STRIP: NEGATIVE
LACTATE SERPL-SCNC: 0.9 MMOL/L (ref 0.5–1.9)
LEUKOCYTE ESTERASE UR QL STRIP: ABNORMAL
LEUKOCYTE ESTERASE UR QL STRIP: ABNORMAL
LIPASE SERPL-CCNC: 38 U/L (ref 4–60)
LYMPHOCYTES # BLD AUTO: 1.6 K/UL (ref 1–4.8)
LYMPHOCYTES NFR BLD: 14 % (ref 18–48)
MCH RBC QN AUTO: 25.4 PG (ref 27–31)
MCHC RBC AUTO-ENTMCNC: 32 G/DL (ref 32–36)
MCV RBC AUTO: 79 FL (ref 82–98)
MICROSCOPIC COMMENT: ABNORMAL
MONOCYTES # BLD AUTO: 0.7 K/UL (ref 0.3–1)
MONOCYTES NFR BLD: 5.8 % (ref 4–15)
NEUTROPHILS # BLD AUTO: 8.8 K/UL (ref 1.8–7.7)
NEUTROPHILS NFR BLD: 78.7 % (ref 38–73)
NITRITE UR QL STRIP: NEGATIVE
NITRITE UR QL STRIP: NEGATIVE
NRBC BLD-RTO: 0 /100 WBC
PH UR STRIP: 6 [PH] (ref 5–8)
PH UR STRIP: 6 [PH] (ref 5–8)
PLATELET # BLD AUTO: 424 K/UL (ref 150–450)
PMV BLD AUTO: 8.7 FL (ref 9.2–12.9)
POTASSIUM SERPL-SCNC: 3.6 MMOL/L (ref 3.5–5.1)
PROT SERPL-MCNC: 7.4 G/DL (ref 6–8.4)
PROT UR QL STRIP: ABNORMAL
PROT UR QL STRIP: ABNORMAL
RBC # BLD AUTO: 4.26 M/UL (ref 4–5.4)
RBC #/AREA URNS HPF: 17 /HPF (ref 0–4)
RH BLD: NORMAL
SODIUM SERPL-SCNC: 133 MMOL/L (ref 136–145)
SP GR UR STRIP: 1.03 (ref 1–1.03)
SP GR UR STRIP: 1.03 (ref 1–1.03)
SQUAMOUS #/AREA URNS HPF: 19 /HPF
URN SPEC COLLECT METH UR: ABNORMAL
URN SPEC COLLECT METH UR: ABNORMAL
UROBILINOGEN UR STRIP-ACNC: ABNORMAL EU/DL
UROBILINOGEN UR STRIP-ACNC: ABNORMAL EU/DL
WBC # BLD AUTO: 11.18 K/UL (ref 3.9–12.7)
WBC #/AREA URNS HPF: 16 /HPF (ref 0–5)

## 2023-01-25 PROCEDURE — 85025 COMPLETE CBC W/AUTO DIFF WBC: CPT | Performed by: EMERGENCY MEDICINE

## 2023-01-25 PROCEDURE — 96375 TX/PRO/DX INJ NEW DRUG ADDON: CPT

## 2023-01-25 PROCEDURE — 96365 THER/PROPH/DIAG IV INF INIT: CPT

## 2023-01-25 PROCEDURE — 25000003 PHARM REV CODE 250: Performed by: EMERGENCY MEDICINE

## 2023-01-25 PROCEDURE — 84702 CHORIONIC GONADOTROPIN TEST: CPT | Performed by: EMERGENCY MEDICINE

## 2023-01-25 PROCEDURE — 86900 BLOOD TYPING SEROLOGIC ABO: CPT | Performed by: EMERGENCY MEDICINE

## 2023-01-25 PROCEDURE — 63600175 PHARM REV CODE 636 W HCPCS: Performed by: EMERGENCY MEDICINE

## 2023-01-25 PROCEDURE — 81001 URINALYSIS AUTO W/SCOPE: CPT | Performed by: EMERGENCY MEDICINE

## 2023-01-25 PROCEDURE — 99285 EMERGENCY DEPT VISIT HI MDM: CPT | Mod: 25

## 2023-01-25 PROCEDURE — 96366 THER/PROPH/DIAG IV INF ADDON: CPT

## 2023-01-25 PROCEDURE — 83690 ASSAY OF LIPASE: CPT | Performed by: EMERGENCY MEDICINE

## 2023-01-25 PROCEDURE — 87086 URINE CULTURE/COLONY COUNT: CPT | Performed by: EMERGENCY MEDICINE

## 2023-01-25 PROCEDURE — 83605 ASSAY OF LACTIC ACID: CPT | Performed by: EMERGENCY MEDICINE

## 2023-01-25 PROCEDURE — 80053 COMPREHEN METABOLIC PANEL: CPT | Performed by: EMERGENCY MEDICINE

## 2023-01-25 PROCEDURE — 87040 BLOOD CULTURE FOR BACTERIA: CPT | Performed by: EMERGENCY MEDICINE

## 2023-01-25 RX ORDER — ONDANSETRON 2 MG/ML
4 INJECTION INTRAMUSCULAR; INTRAVENOUS
Status: COMPLETED | OUTPATIENT
Start: 2023-01-25 | End: 2023-01-25

## 2023-01-25 RX ORDER — CEFUROXIME AXETIL 500 MG/1
500 TABLET ORAL EVERY 12 HOURS
Qty: 10 TABLET | Refills: 0 | Status: ON HOLD | OUTPATIENT
Start: 2023-01-25 | End: 2023-01-30

## 2023-01-25 RX ORDER — MORPHINE SULFATE 4 MG/ML
4 INJECTION, SOLUTION INTRAMUSCULAR; INTRAVENOUS
Status: COMPLETED | OUTPATIENT
Start: 2023-01-25 | End: 2023-01-25

## 2023-01-25 RX ADMIN — SODIUM CHLORIDE 1000 ML: 9 INJECTION, SOLUTION INTRAVENOUS at 10:01

## 2023-01-25 RX ADMIN — PIPERACILLIN SODIUM AND TAZOBACTAM SODIUM 4.5 G: 4; .5 INJECTION, POWDER, LYOPHILIZED, FOR SOLUTION INTRAVENOUS at 10:01

## 2023-01-25 RX ADMIN — ONDANSETRON 4 MG: 2 INJECTION INTRAMUSCULAR; INTRAVENOUS at 11:01

## 2023-01-25 RX ADMIN — MORPHINE SULFATE 4 MG: 4 INJECTION, SOLUTION INTRAMUSCULAR; INTRAVENOUS at 10:01

## 2023-01-25 NOTE — ED PROVIDER NOTES
Encounter Date: 2023       History     Chief Complaint   Patient presents with    Flank Pain     Right flank pain since this morning - patient is 17 weeks pregnant - OB advised her to go to ER - patient is 1 month post op from scope dx with peritonitis     Patient presents complaining of right flank pain.  Patient started with flank pain this morning she is approximately 17 weeks pregnant.  Patient had peritonitis in December.  Since that time she has improved but started with flank pain today which made her concerned.  She denies fever.  No nausea or vomiting.  At the worst symptoms are mild-to-moderate.  Patient has a follow-up appointment tomorrow with Dr. Dinero.    Review of patient's allergies indicates:   Allergen Reactions    Opioids - morphine analogues Itching    Stelara [ustekinumab] Hives    Adalimumab Rash, Itching and Hives     joint ache      Renflexis [infliximab-abda] Rash     Other reaction(s): Rash or Itch, Other: joint ache, Rash or Itch, Other: joint ache     Past Medical History:   Diagnosis Date    Anxiety     Cholestasis during pregnancy     Crohn's colitis     Depression     Endometriosis     GERD (gastroesophageal reflux disease)     IBS (irritable bowel syndrome)     TMJ (dislocation of temporomandibular joint)      Past Surgical History:   Procedure Laterality Date     SECTION  17 &     Two births    COLON SURGERY  21    A small part along with tbe small bowel    COLONOSCOPY N/A 2022    Procedure: COLONOSCOPY;  Surgeon: Gregorio Bourne MD;  Location: [facility];  Service: Gastroenterology;  Laterality: N/A;    DIAGNOSTIC LAPAROSCOPY Left 2022    Procedure: LAPAROSCOPY, DIAGNOSTIC- SAMPLING OF PERITONEAL FLUID;  Surgeon: Yajaira Siegel MD;  Location: [facility] OR;  Service: OB/GYN;  Laterality: Left;    presacral neurectomy          SMALL INTESTINE SURGERY  2021    partial with partial large bowel    SURGICAL REMOVAL OF ENDOMETRIOSIS       TONSILLECTOMY  2006    TUBAL LIGATION  10-26-19    After c-secrtion     No family history on file.  Social History     Tobacco Use    Smoking status: Never    Smokeless tobacco: Never   Substance Use Topics    Alcohol use: Not Currently     Comment: Social once every six months    Drug use: Never     Review of Systems   All other systems reviewed and are negative.    Physical Exam     Initial Vitals [01/25/23 1004]   BP Pulse Resp Temp SpO2   124/72 103 (!) 25 98.2 °F (36.8 °C) 98 %      MAP       --         Physical Exam    Nursing note and vitals reviewed.  Constitutional: She appears well-developed and well-nourished.   Pleasant, polite, patient appears in mild pain   HENT:   Head: Normocephalic and atraumatic.   Eyes: EOM are normal.   Neck: Neck supple.   Normal range of motion.  Cardiovascular:  Normal rate, regular rhythm, normal heart sounds and intact distal pulses.           Pulmonary/Chest: Breath sounds normal. No respiratory distress.   Abdominal: Abdomen is soft.   Musculoskeletal:         General: Normal range of motion.      Cervical back: Normal range of motion and neck supple.     Neurological: She is alert and oriented to person, place, and time. She has normal strength.   Skin: Skin is warm and dry. Capillary refill takes less than 2 seconds.   Psychiatric: She has a normal mood and affect. Her behavior is normal. Judgment and thought content normal.       ED Course   Procedures  Labs Reviewed   CBC W/ AUTO DIFFERENTIAL - Abnormal; Notable for the following components:       Result Value    Hemoglobin 10.8 (*)     Hematocrit 33.8 (*)     MCV 79 (*)     MCH 25.4 (*)     MPV 8.7 (*)     Immature Granulocytes 0.9 (*)     Gran # (ANC) 8.8 (*)     Immature Grans (Abs) 0.10 (*)     Gran % 78.7 (*)     Lymph % 14.0 (*)     All other components within normal limits   COMPREHENSIVE METABOLIC PANEL - Abnormal; Notable for the following components:    Sodium 133 (*)     Albumin 2.9 (*)     Alkaline  Phosphatase 189 (*)     All other components within normal limits   URINALYSIS, REFLEX TO URINE CULTURE - Abnormal; Notable for the following components:    Appearance, UA Hazy (*)     Protein, UA 1+ (*)     Occult Blood UA 3+ (*)     Urobilinogen, UA 2.0-3.0 (*)     Leukocytes, UA 2+ (*)     All other components within normal limits    Narrative:     Specimen Source->Urine   URINALYSIS, REFLEX TO URINE CULTURE - Abnormal; Notable for the following components:    Appearance, UA Hazy (*)     Protein, UA 1+ (*)     Occult Blood UA 3+ (*)     Urobilinogen, UA 2.0-3.0 (*)     Leukocytes, UA 2+ (*)     All other components within normal limits    Narrative:     Specimen Source->Urine   URINALYSIS MICROSCOPIC - Abnormal; Notable for the following components:    RBC, UA 17 (*)     WBC, UA 16 (*)     Hyaline Casts, UA 50 (*)     All other components within normal limits    Narrative:     Specimen Source->Urine   CULTURE, BLOOD   CULTURE, BLOOD   CULTURE, URINE   HCG, QUANTITATIVE    Narrative:     Release to patient->Immediate   LIPASE    Narrative:     Release to patient->Immediate   LACTIC ACID, PLASMA    Narrative:     Release to patient->Immediate   POCT URINE PREGNANCY   GROUP & RH          Imaging Results              US OB 14+ Wks, TransAbd, Single Gestation (Final result)  Result time 01/25/23 13:21:09      Final result by Olivia Romo MD (01/25/23 13:21:09)                   Narrative:    Limited OB ultrasound is compared to prior study dated 12/23/2022    Clinical history is right flank pain, history of subchorionic hemorrhage    There is a single viable intrauterine gestation in variable presentation with positive cardiac activity at 144 bpm.    The placenta is fundal in location without previa or abruption.  Measurements are as follows  BPD 4.48 cm 19 weeks four days  Head circumference 16.15 cm 19 weeks zero days  Abdominal circumference 13.48 cm 19.0 disease X line femur length 2.59 cm 17 weeks six  days    This gives estimated gestational age of 18 weeks six days +/- one week two days with estimated date of delivery of 6/22/2023    There is resolution of the subchorionic hemorrhage.. The ovaries are normal in size and echogenicity. The left ovary measures 4.6 x 4.2 x 5.1 cm.    The right ovary measures 2.7 x 1.5 x 2.6 cm.    There is no free fluid.    IMPRESSION: Single viable intrauterine gestation in variable presentation at 18 weeks six days +/- 1 week two days    Resolution of the subchorionic hemorrhage        Normal appearance of the ovaries    Electronically signed by:  Olivia Romo MD  1/25/2023 1:21 PM CST Workstation: [id number]                                     US Retroperitoneal Complete (Final result)  Result time 01/25/23 12:06:16      Final result by Olivia Romo MD (01/25/23 12:06:16)                   Narrative:    Renal ultrasound    Clinical history is flank pain    The aorta and IVC are normal in caliber.    The right kidney measures 12.5 x 6.3 x 5.8 cm. There is no hydronephrosis.  The left kidney measures 11.5 x 5.4 x 4.6 cm without hydronephrosis    There are multiple gallstones. There is no pericholecystic edema. Common bile duct measures 2.5 mm.    IMPRESSION: Normal renal ultrasound    Multiple small gallstones without evidence of acute cholecystitis    Electronically signed by:  Olivia Romo MD  1/25/2023 12:06 PM CST Workstation: [id number]                                     Medications   sodium chloride 0.9% bolus 1,000 mL 1,000 mL (0 mLs Intravenous Stopped 1/25/23 1252)   morphine injection 4 mg (4 mg Intravenous Given 1/25/23 1056)   ondansetron injection 4 mg (4 mg Intravenous Given 1/25/23 1109)   piperacillin-tazobactam 4.5 g in dextrose 5 % 100 mL IVPB (ready to mix system) (0 g Intravenous Stopped 1/25/23 1332)     Medical Decision Making:   Initial Assessment:   Mild pain  Differential Diagnosis:   Considerations include but are not limited to threatened  miscarriage, UTI, pyelo, obstructive uropathy, gallstones  Clinical Tests:   Lab Tests: Reviewed and Ordered  Radiological Study: Ordered and Reviewed  Medical Tests: Reviewed and Ordered  ED Management:  MDM    Patient presents for emergent evaluation of acute right flank pain that poses a threat to life and/or bodily function.    In the ED patient found to have acute right flank pain with UTI.    I ordered labs and personally reviewed them.  Labs significant for white blood cells on urinalysis.    I ordered ultrasound scan and personally reviewed it and reviewed the radiologist interpretation.  Ultrasound significant for normal renal ultrasound, normal intrauterine pregnancy.      Discharge MDM    Patient was managed in the ED with IV morphine, fluids, Zosyn.    The response to treatment was improved.  At this time patient's abdominal exam is not consistent with peritonitis.  She has a nonsurgical abdomen with a normal white blood cell count and normal lactate.  Her renal ultrasound is normal.  Her urinalysis does show evidence of blood and white blood cells and crystals.  Patient received antibiotics in the emergency department and will be treated with outpatient antibiotics.  Her pain is now controlled.  Intrauterine pregnancy ultrasound is within normal limits.  Patient has a follow-up appointment tomorrow with her Ob  Patient was discharged in stable condition.  Detailed return precautions discussed.                        Clinical Impression:   Final diagnoses:  [R10.9] Abdominal pain  [R10.9] Flank pain (Primary)        ED Disposition Condition    Discharge Stable          ED Prescriptions    None       Follow-up Information       Follow up With Specialties Details Why Contact Info    Sony Dinero MD Obstetrics, Obstetrics and Gynecology Schedule an appointment as soon as possible for a visit on 1/26/2023  [address]  [address] [facility] [address]  [phone]                Peña Brown MD  01/25/23 2961

## 2023-01-28 ENCOUNTER — HOSPITAL ENCOUNTER (OUTPATIENT)
Facility: HOSPITAL | Age: 28
Discharge: HOME | End: 2023-01-30
Attending: EMERGENCY MEDICINE | Admitting: SPECIALIST
Payer: OTHER GOVERNMENT

## 2023-01-28 DIAGNOSIS — R10.9 FLANK PAIN: ICD-10-CM

## 2023-01-28 DIAGNOSIS — O26.832 KIDNEY STONE COMPLICATING PREGNANCY, SECOND TRIMESTER: ICD-10-CM

## 2023-01-28 DIAGNOSIS — N20.0 KIDNEY STONE COMPLICATING PREGNANCY, SECOND TRIMESTER: ICD-10-CM

## 2023-01-28 DIAGNOSIS — N20.1 URETEROLITHIASIS: Primary | ICD-10-CM

## 2023-01-28 LAB
ALBUMIN SERPL BCP-MCNC: 3.1 G/DL (ref 3.5–5.2)
ALP SERPL-CCNC: 208 U/L (ref 55–135)
ALT SERPL W/O P-5'-P-CCNC: 37 U/L (ref 10–44)
ANION GAP SERPL CALC-SCNC: 11 MMOL/L (ref 8–16)
AST SERPL-CCNC: 33 U/L (ref 10–40)
BACTERIA UR CULT: NO GROWTH
BASOPHILS # BLD AUTO: 0.02 K/UL (ref 0–0.2)
BASOPHILS NFR BLD: 0.2 % (ref 0–1.9)
BILIRUB SERPL-MCNC: 0.7 MG/DL (ref 0.1–1)
BUN SERPL-MCNC: 11 MG/DL (ref 6–20)
CALCIUM SERPL-MCNC: 9 MG/DL (ref 8.7–10.5)
CHLORIDE SERPL-SCNC: 100 MMOL/L (ref 95–110)
CO2 SERPL-SCNC: 21 MMOL/L (ref 23–29)
CREAT SERPL-MCNC: 0.6 MG/DL (ref 0.5–1.4)
DIFFERENTIAL METHOD: ABNORMAL
EOSINOPHIL # BLD AUTO: 0.1 K/UL (ref 0–0.5)
EOSINOPHIL NFR BLD: 0.5 % (ref 0–8)
ERYTHROCYTE [DISTWIDTH] IN BLOOD BY AUTOMATED COUNT: 14.4 % (ref 11.5–14.5)
EST. GFR  (NO RACE VARIABLE): >60 ML/MIN/1.73 M^2
GLUCOSE SERPL-MCNC: 102 MG/DL (ref 70–110)
HCT VFR BLD AUTO: 34.9 % (ref 37–48.5)
HGB BLD-MCNC: 11.2 G/DL (ref 12–16)
IMM GRANULOCYTES # BLD AUTO: 0.11 K/UL (ref 0–0.04)
IMM GRANULOCYTES NFR BLD AUTO: 0.8 % (ref 0–0.5)
LIPASE SERPL-CCNC: 39 U/L (ref 4–60)
LYMPHOCYTES # BLD AUTO: 1.8 K/UL (ref 1–4.8)
LYMPHOCYTES NFR BLD: 13.5 % (ref 18–48)
MCH RBC QN AUTO: 25.4 PG (ref 27–31)
MCHC RBC AUTO-ENTMCNC: 32.1 G/DL (ref 32–36)
MCV RBC AUTO: 79 FL (ref 82–98)
MONOCYTES # BLD AUTO: 0.8 K/UL (ref 0.3–1)
MONOCYTES NFR BLD: 6.2 % (ref 4–15)
NEUTROPHILS # BLD AUTO: 10.5 K/UL (ref 1.8–7.7)
NEUTROPHILS NFR BLD: 78.8 % (ref 38–73)
NRBC BLD-RTO: 0 /100 WBC
PLATELET # BLD AUTO: 451 K/UL (ref 150–450)
PMV BLD AUTO: 8.6 FL (ref 9.2–12.9)
POTASSIUM SERPL-SCNC: 3.4 MMOL/L (ref 3.5–5.1)
PROT SERPL-MCNC: 7.8 G/DL (ref 6–8.4)
RBC # BLD AUTO: 4.41 M/UL (ref 4–5.4)
SODIUM SERPL-SCNC: 132 MMOL/L (ref 136–145)
WBC # BLD AUTO: 13.29 K/UL (ref 3.9–12.7)

## 2023-01-28 PROCEDURE — 99285 EMERGENCY DEPT VISIT HI MDM: CPT | Mod: 25

## 2023-01-28 PROCEDURE — 83690 ASSAY OF LIPASE: CPT

## 2023-01-28 PROCEDURE — 96374 THER/PROPH/DIAG INJ IV PUSH: CPT

## 2023-01-28 PROCEDURE — 80053 COMPREHEN METABOLIC PANEL: CPT

## 2023-01-28 PROCEDURE — 96361 HYDRATE IV INFUSION ADD-ON: CPT

## 2023-01-28 PROCEDURE — 85025 COMPLETE CBC W/AUTO DIFF WBC: CPT

## 2023-01-28 NOTE — Clinical Note
Diagnosis: Flank pain [328883]   Future Attending Provider: BRITTA REED [[id number]]   Admitting Provider:: MARIS PACHECO [[id number]]   Special Needs:: No Special Needs [1]

## 2023-01-29 PROBLEM — O99.891 KIDNEY STONE COMPLICATING PREGNANCY, SECOND TRIMESTER: Status: ACTIVE | Noted: 2023-01-29

## 2023-01-29 PROBLEM — N20.0 KIDNEY STONE COMPLICATING PREGNANCY, SECOND TRIMESTER: Status: ACTIVE | Noted: 2023-01-29

## 2023-01-29 PROBLEM — O26.832 KIDNEY STONE COMPLICATING PREGNANCY, SECOND TRIMESTER: Status: ACTIVE | Noted: 2023-01-29

## 2023-01-29 LAB
BACTERIA #/AREA URNS HPF: NEGATIVE /HPF
BILIRUB UR QL STRIP: NEGATIVE
CLARITY UR: ABNORMAL
COLOR UR: YELLOW
GLUCOSE UR QL STRIP: NEGATIVE
HGB UR QL STRIP: NEGATIVE
HYALINE CASTS #/AREA URNS LPF: 18 /LPF
KETONES UR QL STRIP: NEGATIVE
LEUKOCYTE ESTERASE UR QL STRIP: ABNORMAL
MICROSCOPIC COMMENT: ABNORMAL
NITRITE UR QL STRIP: NEGATIVE
PH UR STRIP: 6 [PH] (ref 5–8)
PROT UR QL STRIP: NEGATIVE
RBC #/AREA URNS HPF: 4 /HPF (ref 0–4)
SP GR UR STRIP: 1.01 (ref 1–1.03)
SQUAMOUS #/AREA URNS HPF: 14 /HPF
URN SPEC COLLECT METH UR: ABNORMAL
UROBILINOGEN UR STRIP-ACNC: NEGATIVE EU/DL
WBC #/AREA URNS HPF: 5 /HPF (ref 0–5)

## 2023-01-29 PROCEDURE — G0378 HOSPITAL OBSERVATION PER HR: HCPCS

## 2023-01-29 PROCEDURE — 96361 HYDRATE IV INFUSION ADD-ON: CPT

## 2023-01-29 PROCEDURE — 96376 TX/PRO/DX INJ SAME DRUG ADON: CPT | Mod: 59

## 2023-01-29 PROCEDURE — 25000003 PHARM REV CODE 250: Performed by: EMERGENCY MEDICINE

## 2023-01-29 PROCEDURE — 25500020 PHARM REV CODE 255: Performed by: EMERGENCY MEDICINE

## 2023-01-29 PROCEDURE — 63600175 PHARM REV CODE 636 W HCPCS: Performed by: EMERGENCY MEDICINE

## 2023-01-29 PROCEDURE — 81001 URINALYSIS AUTO W/SCOPE: CPT

## 2023-01-29 PROCEDURE — 25000003 PHARM REV CODE 250: Performed by: SPECIALIST

## 2023-01-29 RX ORDER — TAMSULOSIN HYDROCHLORIDE 0.4 MG/1
0.4 CAPSULE ORAL DAILY
Status: DISCONTINUED | OUTPATIENT
Start: 2023-01-29 | End: 2023-01-30 | Stop reason: HOSPADM

## 2023-01-29 RX ORDER — MORPHINE SULFATE 2 MG/ML
2 INJECTION, SOLUTION INTRAMUSCULAR; INTRAVENOUS
Status: COMPLETED | OUTPATIENT
Start: 2023-01-29 | End: 2023-01-29

## 2023-01-29 RX ORDER — SODIUM CHLORIDE 9 MG/ML
INJECTION, SOLUTION INTRAVENOUS CONTINUOUS
Status: DISCONTINUED | OUTPATIENT
Start: 2023-01-29 | End: 2023-01-30 | Stop reason: HOSPADM

## 2023-01-29 RX ORDER — SODIUM CHLORIDE 0.9 % (FLUSH) 0.9 %
10 SYRINGE (ML) INJECTION
Status: DISCONTINUED | OUTPATIENT
Start: 2023-01-29 | End: 2023-01-30 | Stop reason: HOSPADM

## 2023-01-29 RX ORDER — SODIUM CHLORIDE 9 MG/ML
1000 INJECTION, SOLUTION INTRAVENOUS
Status: COMPLETED | OUTPATIENT
Start: 2023-01-29 | End: 2023-01-29

## 2023-01-29 RX ORDER — MORPHINE SULFATE 2 MG/ML
2 INJECTION, SOLUTION INTRAMUSCULAR; INTRAVENOUS EVERY 4 HOURS PRN
Status: DISCONTINUED | OUTPATIENT
Start: 2023-01-29 | End: 2023-01-30 | Stop reason: HOSPADM

## 2023-01-29 RX ORDER — ONDANSETRON 2 MG/ML
4 INJECTION INTRAMUSCULAR; INTRAVENOUS EVERY 8 HOURS PRN
Status: DISCONTINUED | OUTPATIENT
Start: 2023-01-29 | End: 2023-01-30 | Stop reason: HOSPADM

## 2023-01-29 RX ADMIN — MORPHINE SULFATE 2 MG: 2 INJECTION, SOLUTION INTRAMUSCULAR; INTRAVENOUS at 01:01

## 2023-01-29 RX ADMIN — IOHEXOL 100 ML: 350 INJECTION, SOLUTION INTRAVENOUS at 04:01

## 2023-01-29 RX ADMIN — SODIUM CHLORIDE 1000 ML: 0.9 INJECTION, SOLUTION INTRAVENOUS at 01:01

## 2023-01-29 RX ADMIN — TAMSULOSIN HYDROCHLORIDE 0.4 MG: 0.4 CAPSULE ORAL at 12:01

## 2023-01-29 RX ADMIN — MORPHINE SULFATE 2 MG: 2 INJECTION, SOLUTION INTRAMUSCULAR; INTRAVENOUS at 06:01

## 2023-01-29 RX ADMIN — SODIUM CHLORIDE: 0.9 INJECTION, SOLUTION INTRAVENOUS at 06:01

## 2023-01-29 RX ADMIN — SODIUM CHLORIDE 1000 ML: 0.9 INJECTION, SOLUTION INTRAVENOUS at 05:01

## 2023-01-29 NOTE — FIRST PROVIDER EVALUATION
Medical screening examination initiated.  I have conducted a focused provider triage encounter, findings are as follows:    Brief history of present illness:  Patient presents to the ED for right flank pain.  Patient states she had this pain on Wednesday and came to the ED was treated.  Patient states she has been doing better but tonight the pain got worse again.  Patient states she is been nauseous but denies vomiting patient denies fever.  Patient states she is 18 weeks pregnant.    Vitals:    01/28/23 2250   BP: 124/83   BP Location: Left arm   Patient Position: Sitting   Pulse: 100   Resp: 16   Temp: 98.3 °F (36.8 °C)   TempSrc: Oral   SpO2: 100%   Weight: 93 kg (205 lb)       Pertinent physical exam:  Patient is awake and alert in moderate distress from pain.    Brief workup plan:  Lab work, urine, ultrasound    Preliminary workup initiated; this workup will be continued and followed by the physician or advanced practice provider that is assigned to the patient when roomed.

## 2023-01-29 NOTE — ED PROVIDER NOTES
"Encounter Date: 2023       History     Chief Complaint   Patient presents with    Back Pain     Reports "back pain" "was here a few days ago for same thing"      Patient presents emergency department with reported right flank pain was seen on the  for similar presentation patient denies any fever chills ultrasound at that time showed evidence of gallstones there was no evidence of hydronephrosis she denies any hematuria no dysuria she denies any noted diarrhea no blood in her stool he does have a history of Crohn's disease is not taking any medications currently for the Crohn's allergic reaction to all the Crohn's medications patient was admitted in December and treated for peritonitis they are unable to determine the exact cause of her peritonitis and she had laparoscopic surgery at that time which showed a fluid collection which failed to grow out any bacteria she was treated with antibiotics for suspected peritonitis of unknown origin she denies any vaginal bleeding or discharge the pain has been right-sided throughout      Review of patient's allergies indicates:   Allergen Reactions    Opioids - morphine analogues Itching    Stelara [ustekinumab] Hives    Adalimumab Rash, Itching and Hives     joint ache      Renflexis [infliximab-abda] Rash     Other reaction(s): Rash or Itch, Other: joint ache, Rash or Itch, Other: joint ache     Past Medical History:   Diagnosis Date    Anxiety     Cholestasis during pregnancy     Crohn's colitis     Depression     Endometriosis     GERD (gastroesophageal reflux disease)     IBS (irritable bowel syndrome)     TMJ (dislocation of temporomandibular joint)      Past Surgical History:   Procedure Laterality Date     SECTION  17 &     Two births    COLON SURGERY  21    A small part along with tbe small bowel    COLONOSCOPY N/A 2022    Procedure: COLONOSCOPY;  Surgeon: Gregorio Bourne MD;  Location: [facility];  Service: Gastroenterology;  " Laterality: N/A;    DIAGNOSTIC LAPAROSCOPY Left 12/20/2022    Procedure: LAPAROSCOPY, DIAGNOSTIC- SAMPLING OF PERITONEAL FLUID;  Surgeon: Yajaira Siegel MD;  Location: [facility] OR;  Service: OB/GYN;  Laterality: Left;    presacral neurectomy      2020    SMALL INTESTINE SURGERY  09/21/2021    partial with partial large bowel    SURGICAL REMOVAL OF ENDOMETRIOSIS      TONSILLECTOMY  2006    TUBAL LIGATION  10-26-19    After c-secrtion     No family history on file.  Social History     Tobacco Use    Smoking status: Never    Smokeless tobacco: Never   Substance Use Topics    Alcohol use: Not Currently     Comment: Social once every six months    Drug use: Never     Review of Systems   Respiratory:  Positive for cough.    Gastrointestinal:  Positive for abdominal pain. Negative for blood in stool, constipation and diarrhea.   Genitourinary:  Positive for flank pain. Negative for frequency, hematuria, pelvic pain, vaginal bleeding, vaginal discharge and vaginal pain.     Physical Exam     Initial Vitals [01/28/23 2250]   BP Pulse Resp Temp SpO2   124/83 100 16 98.3 °F (36.8 °C) 100 %      MAP       --         Physical Exam    Constitutional: She appears well-developed and well-nourished. She appears distressed.   HENT:   Head: Normocephalic and atraumatic.   Right Ear: External ear normal.   Left Ear: External ear normal.   Mouth/Throat: Oropharynx is clear and moist.   Eyes: Conjunctivae and EOM are normal. Pupils are equal, round, and reactive to light.   Neck: Neck supple.   Normal range of motion.  Cardiovascular:  Normal rate, regular rhythm, normal heart sounds and intact distal pulses.           Pulmonary/Chest: Breath sounds normal. No respiratory distress.   Abdominal: Abdomen is soft. Bowel sounds are normal. There is abdominal tenderness.   Genitourinary:    Genitourinary Comments: No CVA tenderness     Musculoskeletal:         General: No edema. Normal range of motion.      Cervical back: Normal range of motion  and neck supple.     Neurological: She is alert and oriented to person, place, and time. She has normal strength. GCS score is 15. GCS eye subscore is 4. GCS verbal subscore is 5. GCS motor subscore is 6.   Skin: Skin is warm and dry. Capillary refill takes less than 2 seconds. No rash noted.   Psychiatric: She has a normal mood and affect. Her behavior is normal.       ED Course   Procedures  Labs Reviewed   CBC W/ AUTO DIFFERENTIAL - Abnormal; Notable for the following components:       Result Value    WBC 13.29 (*)     Hemoglobin 11.2 (*)     Hematocrit 34.9 (*)     MCV 79 (*)     MCH 25.4 (*)     Platelets 451 (*)     MPV 8.6 (*)     Immature Granulocytes 0.8 (*)     Gran # (ANC) 10.5 (*)     Immature Grans (Abs) 0.11 (*)     Gran % 78.8 (*)     Lymph % 13.5 (*)     All other components within normal limits   COMPREHENSIVE METABOLIC PANEL - Abnormal; Notable for the following components:    Sodium 132 (*)     Potassium 3.4 (*)     CO2 21 (*)     Albumin 3.1 (*)     Alkaline Phosphatase 208 (*)     All other components within normal limits   URINALYSIS, REFLEX TO URINE CULTURE - Abnormal; Notable for the following components:    Appearance, UA Hazy (*)     Leukocytes, UA 1+ (*)     All other components within normal limits    Narrative:     Specimen Source->Urine   URINALYSIS MICROSCOPIC - Abnormal; Notable for the following components:    Hyaline Casts, UA 18 (*)     All other components within normal limits    Narrative:     Specimen Source->Urine   LIPASE          Imaging Results              CT Abdomen Pelvis With Contrast (Final result)  Result time 01/29/23 04:24:51      Final result by Lulú Chavez MD (01/29/23 04:24:51)                   Narrative:    EXAM:  CT Abdomen and Pelvis With Intravenous Contrast    CLINICAL HISTORY:  The patient is 27 years old and is Female; Flank pain, kidney stone suspected    TECHNIQUE:  Axial computed tomography images of the abdomen and pelvis with intravenous  contrast.  Sagittal and coronal reformatted images were created and reviewed.  This CT exam was performed using one or more of the following dose reduction techniques:  automated exposure control, adjustment of the mA and/or kV according to patient size, and/or use of iterative reconstruction technique.    COMPARISON:  Pelvic ultrasound performed the same day.    FINDINGS:  LUNG BASES:  Unremarkable.  No mass.  No consolidation.    ABDOMEN:  LIVER:  The liver is enlarged.  GALLBLADDER AND BILE DUCTS:  No calcified stones.  No ductal dilation.  PANCREAS:  No ductal dilation.  No mass.  SPLEEN:  The spleen is enlarged.  ADRENALS:  Unremarkable.  No mass.  KIDNEYS AND URETERS:  Mild right hydroureteronephrosis is present secondary to an 8 mm distal right ureteral calculus.  Mild delayed enhancement of the right kidney is noted. The left kidney is normal without hydronephrosis or hydroureter.  STOMACH AND BOWEL:  The stomach is minimally distended with fluid. The small bowel is relatively normal in caliber. Stool is present throughout the colon. Postsurgical change of the right colon is present. There is no bowel obstruction.    PELVIS:  APPENDIX:  No findings to suggest acute appendicitis.  BLADDER:  Unremarkable.  No mass.  REPRODUCTIVE:  A gravid uterus is present with a single intrauterine gestation. Fetal presentation is breech.  A complex left ovarian cyst is present demonstrating multiple septations and at least one foci of fat. This measures approximately 5.3 x 5.1 cm.    ABDOMEN and PELVIS:  INTRAPERITONEAL SPACE:  Trace free fluid is present within the bilateral adnexa and paracolic gutters.  BONES/JOINTS:  No acute fracture.  SOFT TISSUES:  Mild nonspecific edema within the mesentery specifically in the left lower quadrant is noted.  VASCULATURE:  Unremarkable.  No abdominal aortic aneurysm.  LYMPH NODES:  Unremarkable. No enlarged lymph nodes.    IMPRESSION:  1.  Mild right hydroureteronephrosis is present  secondary to an 8 mm distal right ureteral calculus.  2.  Complex left ovarian lesion with at least one foci of fat which may be secondary to an ovarian dermoid. However, recommend further evaluation with pelvic MRI to further characterize this mass.  3.  Gravid uterus with single intrauterine gestation.    Electronically signed by:  Lulú Chavez MD  1/29/2023 4:24 AM CST Workstation: [id number]                                     CTA Chest Non-Coronary (PE Studies) (Final result)  Result time 01/29/23 04:20:01      Final result by Lulú Chavez MD (01/29/23 04:20:01)                   Narrative:    EXAM:  CT Angiography Chest With Intravenous Contrast    CLINICAL HISTORY:  The patient is 27 years old and is Female; Pulmonary embolism (PE) suspected, pregnant    TECHNIQUE:  Axial computed tomographic angiography images of the chest with intravenous contrast.  Sagittal and coronal reformatted images were created and reviewed.  This CT exam was performed using one or more of the following dose reduction techniques:  automated exposure control, adjustment of the mA and/or kV according to patient size, and/or use of iterative reconstruction technique.  MIP reconstructed images were created and reviewed.    COMPARISON:  No relevant prior studies available.    FINDINGS:  ARTIFACTS:  The exam is suboptimal secondary to motion artifact.  PULMONARY ARTERIES:  There are no obvious filling defects identified within the main pulmonary arteries and proximal segmental branches to suggest pulmonary embolism.  AORTA:  No acute findings.  No thoracic aortic aneurysm.  LUNGS:  Bilateral lower lobe atelectasis is noted. The lungs are otherwise well-inflated and clear.  No mass.  PLEURAL SPACE:  Unremarkable.  No significant effusion.  No pneumothorax.  HEART:  Unremarkable.  No cardiomegaly.  No significant pericardial effusion.  No evidence of RV dysfunction.  BONES/JOINTS:  No acute fracture.  No dislocation.  SOFT TISSUES:   Unremarkable.  LYMPH NODES:  Unremarkable.  No enlarged lymph nodes.    IMPRESSION:  No evidence of pulmonary embolism. The distal segmental and subsegmental branches are not adequately evaluated secondary to motion.    Electronically signed by:  Lulú Chavez MD  1/29/2023 4:20 AM CST Workstation: [id number]                                     US OB 14+ Wks, TransAbd, Single Gestation (Final result)  Result time 01/29/23 02:10:40      Final result by Lulú Chavez MD (01/29/23 02:10:40)                   Narrative:    EXAM:  US Pregnancy, Limited    CLINICAL HISTORY:  The patient is 27 years old and is Female; flank pain    TECHNIQUE:  Real-time limited ultrasound of the pregnant maternal uterus with image documentation.    COMPARISON:  Ultrasound January 25, 2023.    FINDINGS:  GESTATIONAL AGE:  Gestational age is 19 weeks 3 days based on ultrasound composite.  POSITION:  A single intrauterine gestation is present.  Fetal presentation is variable.  HEART RATE:  Fetal heart rate 148 bpm.  PLACENTA:  The placenta is fundal in location.  CERVIX:  The cervix is closed.  ADNEXA:  The right ovary is normal.  A complex heterogeneous left ovarian cyst is present demonstrate increased echogenicity. The appearance is similar to prior exam. There is no evidence of torsion.    IMPRESSION:  1.  Single IUP at 19 weeks 3 days with heart rate of 148 bpm.  2.  Complex left ovarian cyst without evidence of torsion. Findings may be secondary to an ovarian dermoid; however, an ovarian mass is not excluded. Recommend attention on follow-up.    Electronically signed by:  Lulú Chavez MD  1/29/2023 2:10 AM CST Workstation: [id number]                                     Medications   morphine injection 2 mg (2 mg Intravenous Given 1/29/23 0108)   0.9%  NaCl infusion (0 mLs Intravenous Stopped 1/29/23 0212)   iohexoL (OMNIPAQUE 350) injection 100 mL (100 mLs Intravenous Given 1/29/23 0408)   0.9%  NaCl infusion (1,000 mLs  Intravenous New Bag 1/29/23 0519)     Medical Decision Making:   Differential Diagnosis:   Appendicitis, ureteral colic, pyelonephritis, pulmonary embolism  Clinical Tests:   Lab Tests: Ordered and Reviewed  Radiological Study: Ordered and Reviewed  ED Management:  Patient presents emergency department with flank pain she is 18 weeks pregnant we discussed the risks and benefits of radiation associated with CT scan determining the cause of her pain ruling out pulmonary embolism appendicitis and kidney stone patient consented to CT scan and CT scan did reveal an 8 mm distal ureteral calculus causing hydronephrosis I discussed these findings with Dr. Dinero will admit for pain control IV fluid resuscitation Urology consult           ED Course as of 01/29/23 0527   Sun Jan 29, 2023 0318 Microscopic Comment: SEE COMMENT [AT]      ED Course User Index  [AT] Gómez Tavarez MD                 Clinical Impression:   Final diagnoses:  [R10.9] Flank pain  [N20.1] Ureterolithiasis (Primary)        ED Disposition Condition    Observation Stable                Gómez Taavrez MD  01/29/23 0527

## 2023-01-29 NOTE — PROGRESS NOTES
Awaiting results for urine culture.  Patient is currently inpatient with OBGYN under [facility].

## 2023-01-29 NOTE — H&P
[facility]  Obstetrics  History & Physical    Patient Name: Agnes Matos  MRN: [id number]  Admission Date: 2023  Primary Care Provider: Rose Torres MD    Subjective:     Principal Problem:Kidney stone complicating pregnancy, second trimester    History of Present Illness:   27-year-old  3 P2 recently transferred to me this week from another care provider presents to the emergency department with reported right flank pain was seen on the  for similar with no significant findings.  CT scan reveals 8 mm renal calculus near the bladder , she denies any hematuria no dysuria she denies any noted diarrhea no blood in her stool. Patient was admitted in December and treated for peritonitis they are unable to determine the exact cause of her peritonitis and she had laparoscopic surgery at that time which showed a fluid collection which failed to grow out any bacteria she was treated with antibiotics for suspected peritonitis of unknown origin.     CT also reveals 5 cm suspected dermoid cyst.    OB History    Para Term  AB Living   1 0 0 0 0 0   SAB IAB Ectopic Multiple Live Births   0 0 0 0 0      # Outcome Date GA Lbr Kana/2nd Weight Sex Delivery Anes PTL Lv   1 Current              Past Medical History:   Diagnosis Date    Anxiety     Cholestasis during pregnancy     Crohn's colitis     Depression     Endometriosis     GERD (gastroesophageal reflux disease)     IBS (irritable bowel syndrome)     TMJ (dislocation of temporomandibular joint)      Past Surgical History:   Procedure Laterality Date     SECTION  17 &     Two births    COLON SURGERY  21    A small part along with tbe small bowel    COLONOSCOPY N/A 2022    Procedure: COLONOSCOPY;  Surgeon: Gregorio Bourne MD;  Location: [facility];  Service: Gastroenterology;  Laterality: N/A;    DIAGNOSTIC LAPAROSCOPY Left 2022    Procedure: LAPAROSCOPY, DIAGNOSTIC- SAMPLING OF PERITONEAL  FLUID;  Surgeon: Yajaira Siegel MD;  Location: [facility];  Service: OB/GYN;  Laterality: Left;    presacral neurectomy      2020    SMALL INTESTINE SURGERY  09/21/2021    partial with partial large bowel    SURGICAL REMOVAL OF ENDOMETRIOSIS      TONSILLECTOMY  2006    TUBAL LIGATION  10-26-19    After c-secrtion       PTA Medications   Medication Sig    cefUROXime (CEFTIN) 500 MG tablet Take 1 tablet (500 mg total) by mouth every 12 (twelve) hours. for 5 days    oxyCODONE-acetaminophen (PERCOCET) 5-325 mg per tablet Take 1 tablet by mouth every 4 (four) hours as needed for Pain.    PNV no.95/ferrous fum/folic ac (PRENATAL ORAL) Take 1 tablet by mouth once daily.       Review of patient's allergies indicates:   Allergen Reactions    Opioids - morphine analogues Itching    Stelara [ustekinumab] Hives    Adalimumab Rash, Itching and Hives     joint ache      Renflexis [infliximab-abda] Rash     Other reaction(s): Rash or Itch, Other: joint ache, Rash or Itch, Other: joint ache        Family History    None       Tobacco Use    Smoking status: Never    Smokeless tobacco: Never   Substance and Sexual Activity    Alcohol use: Not Currently     Comment: Social once every six months    Drug use: Never    Sexual activity: Yes     Partners: Male     Birth control/protection: See Surgical Hx     Comment: Tubal ligation     Review of Systems   Objective:     Vital Signs (Most Recent):  Temp: 98.3 °F (36.8 °C) (01/29/23 0843)  Pulse: 82 (01/29/23 0843)  Resp: 17 (01/29/23 0843)  BP: 96/61 (01/29/23 0843)  SpO2: 100 % (01/29/23 0843) Vital Signs (24h Range):  Temp:  [98.3 °F (36.8 °C)] 98.3 °F (36.8 °C)  Pulse:  [] 82  Resp:  [16-29] 17  SpO2:  [98 %-100 %] 100 %  BP: ()/(52-83) 96/61     Weight: 93 kg (205 lb)  Body mass index is 36.31 kg/m².    Physical Exam      Significant Labs:  No results found for: GROUPTRH, HEPBSAG, RUBELLAIGGSC, STREPBCULT, AFP, [id number]    I have personallly reviewed all pertinent lab  results from the last 24 hours.  Recent Lab Results         01/29/23  0210   01/28/23  2316        Albumin   3.1       Alkaline Phosphatase   208       ALT   37       Anion Gap   11       Appearance, UA Hazy         AST   33       Bacteria, UA Negative         Baso #   0.02       Basophil %   0.2       Bilirubin (UA) Negative         BILIRUBIN TOTAL   0.7  Comment: For infants and newborns, interpretation of results should be based  on gestational age, weight and in agreement with clinical  observations.    Premature Infant recommended reference ranges:  Up to 24 hours.............<8.0 mg/dL  Up to 48 hours............<12.0 mg/dL  3-5 days..................<15.0 mg/dL  6-29 days.................<15.0 mg/dL         BUN   11       Calcium   9.0       Chloride   100       CO2   21       Color, UA Yellow         Creatinine   0.6       Differential Method   Automated       eGFR   >60.0       Eos #   0.1       Eosinophil %   0.5       Glucose   102       Glucose, UA Negative         Gran # (ANC)   10.5       Gran %   78.8       Hematocrit   34.9       Hemoglobin   11.2       Hyaline Casts, UA 18         Immature Grans (Abs)   0.11  Comment: Mild elevation in immature granulocytes is non specific and   can be seen in a variety of conditions including stress response,   acute inflammation, trauma and pregnancy. Correlation with other   laboratory and clinical findings is essential.         Immature Granulocytes   0.8       Ketones, UA Negative         Leukocytes, UA 1+         Lipase   39       Lymph #   1.8       Lymph %   13.5       MCH   25.4       MCHC   32.1       MCV   79       Microscopic Comment SEE COMMENT  Comment: Other formed elements not mentioned in the report are not   present in the microscopic examination.            Mono #   0.8       Mono %   6.2       MPV   8.6       NITRITE UA Negative         nRBC   0       Occult Blood UA Negative         pH, UA 6.0         Platelets   451       Potassium   3.4        PROTEIN TOTAL   7.8       Protein, UA Negative  Comment: Recommend a 24 hour urine protein or a urine   protein/creatinine ratio if globulin induced proteinuria is  clinically suspected.           RBC   4.41       RBC, UA 4         RDW   14.4       Sodium   132       Specific [place], UA 1.010         Specimen UA Urine, Clean Catch         Squam Epithel, UA 14         UROBILINOGEN UA Negative         WBC, UA 5         WBC   13.29               Assessment/Plan:     27 y.o. female  at 18w1d for:    Active Diagnoses:    Diagnosis Date Noted POA    PRINCIPAL PROBLEM:  Kidney stone complicating pregnancy, second trimester [O26.832, N20.0] 2023 Unknown      Problems Resolved During this Admission:       Right distal ureteral calculus    Discussed case with Urology will currently treat conservatively with IV hydration as well as Flomax daily to see if the patient will pass the stone with vigorous urine straining.  If patient tolerates pain will able to treat outpatient will keep overnight for observation    Sony Dinero MD  Obstetrics  [facility]

## 2023-01-29 NOTE — CLINICAL REVIEW
Chart reviewed and plan discussed with OB physician.     Full consult note to follow.     Consult for 8 mm ureteral stone in patient 18 weeks pregnant.   Patient presented to ED with back pain. Had presented with similar symptoms 4 days ago. Was discharged home with abx and US at that time had shown no hydro.     Now CT shows a 8 mm right UVJ stone with mild hydro. UA with only 4 WBC and 14 squams. WBC 13, other labs unremarkable. Urine culture from 1/25 no growth and she has been on abx.  Pain is currently well controlled.       Will plan for conservative management and trial of passage with close follow up.   Stone is very close to the bladder and has already traveled past the enlarged uterus.   As long as pain is controlled and she has no concern for infection can try to pass stone.     NEEDS TO STRAIN ALL URINE   Aggressive fluid hydration  Flomax if ok from OB standpoint  Can be discharged home if patient is reliable and will strain urine  If has not passed stone in next week or clinical picture changes can plan for ureteroscopy however would like to avoid anesthesia as well as further exposure to radiation if possible         Marry Erickson MD  Urology Department

## 2023-01-30 VITALS
BODY MASS INDEX: 36.32 KG/M2 | HEART RATE: 95 BPM | SYSTOLIC BLOOD PRESSURE: 99 MMHG | TEMPERATURE: 99 F | WEIGHT: 205 LBS | HEIGHT: 63 IN | DIASTOLIC BLOOD PRESSURE: 63 MMHG | RESPIRATION RATE: 16 BRPM | OXYGEN SATURATION: 100 %

## 2023-01-30 LAB
ALBUMIN SERPL BCP-MCNC: 2.3 G/DL (ref 3.5–5.2)
ALP SERPL-CCNC: 160 U/L (ref 55–135)
ALT SERPL W/O P-5'-P-CCNC: 33 U/L (ref 10–44)
ANION GAP SERPL CALC-SCNC: 9 MMOL/L (ref 8–16)
AST SERPL-CCNC: 26 U/L (ref 10–40)
BACTERIA BLD CULT: NORMAL
BACTERIA BLD CULT: NORMAL
BASOPHILS # BLD AUTO: 0.02 K/UL (ref 0–0.2)
BASOPHILS NFR BLD: 0.2 % (ref 0–1.9)
BILIRUB SERPL-MCNC: 0.6 MG/DL (ref 0.1–1)
BUN SERPL-MCNC: <5 MG/DL (ref 6–20)
CALCIUM SERPL-MCNC: 8 MG/DL (ref 8.7–10.5)
CHLORIDE SERPL-SCNC: 103 MMOL/L (ref 95–110)
CO2 SERPL-SCNC: 22 MMOL/L (ref 23–29)
CREAT SERPL-MCNC: 0.5 MG/DL (ref 0.5–1.4)
DIFFERENTIAL METHOD: ABNORMAL
EOSINOPHIL # BLD AUTO: 0.1 K/UL (ref 0–0.5)
EOSINOPHIL NFR BLD: 0.6 % (ref 0–8)
ERYTHROCYTE [DISTWIDTH] IN BLOOD BY AUTOMATED COUNT: 14.6 % (ref 11.5–14.5)
EST. GFR  (NO RACE VARIABLE): >60 ML/MIN/1.73 M^2
GLUCOSE SERPL-MCNC: 83 MG/DL (ref 70–110)
HCT VFR BLD AUTO: 29.6 % (ref 37–48.5)
HGB BLD-MCNC: 9.3 G/DL (ref 12–16)
IMM GRANULOCYTES # BLD AUTO: 0.09 K/UL (ref 0–0.04)
IMM GRANULOCYTES NFR BLD AUTO: 0.9 % (ref 0–0.5)
LYMPHOCYTES # BLD AUTO: 1.5 K/UL (ref 1–4.8)
LYMPHOCYTES NFR BLD: 15.6 % (ref 18–48)
MCH RBC QN AUTO: 25.3 PG (ref 27–31)
MCHC RBC AUTO-ENTMCNC: 31.4 G/DL (ref 32–36)
MCV RBC AUTO: 81 FL (ref 82–98)
MONOCYTES # BLD AUTO: 0.7 K/UL (ref 0.3–1)
MONOCYTES NFR BLD: 6.6 % (ref 4–15)
NEUTROPHILS # BLD AUTO: 7.5 K/UL (ref 1.8–7.7)
NEUTROPHILS NFR BLD: 76.1 % (ref 38–73)
NRBC BLD-RTO: 0 /100 WBC
PLATELET # BLD AUTO: 368 K/UL (ref 150–450)
PMV BLD AUTO: 8.7 FL (ref 9.2–12.9)
POTASSIUM SERPL-SCNC: 3.3 MMOL/L (ref 3.5–5.1)
PROT SERPL-MCNC: 6.2 G/DL (ref 6–8.4)
RBC # BLD AUTO: 3.67 M/UL (ref 4–5.4)
SODIUM SERPL-SCNC: 134 MMOL/L (ref 136–145)
WBC # BLD AUTO: 9.89 K/UL (ref 3.9–12.7)

## 2023-01-30 PROCEDURE — 36415 COLL VENOUS BLD VENIPUNCTURE: CPT | Performed by: SPECIALIST

## 2023-01-30 PROCEDURE — 85025 COMPLETE CBC W/AUTO DIFF WBC: CPT | Performed by: SPECIALIST

## 2023-01-30 PROCEDURE — 99223 PR INITIAL HOSPITAL CARE,LEVL III: ICD-10-PCS | Mod: ,,, | Performed by: STUDENT IN AN ORGANIZED HEALTH CARE EDUCATION/TRAINING PROGRAM

## 2023-01-30 PROCEDURE — 80053 COMPREHEN METABOLIC PANEL: CPT | Performed by: SPECIALIST

## 2023-01-30 PROCEDURE — 25000003 PHARM REV CODE 250: Performed by: SPECIALIST

## 2023-01-30 PROCEDURE — G0378 HOSPITAL OBSERVATION PER HR: HCPCS

## 2023-01-30 PROCEDURE — 99223 1ST HOSP IP/OBS HIGH 75: CPT | Mod: ,,, | Performed by: STUDENT IN AN ORGANIZED HEALTH CARE EDUCATION/TRAINING PROGRAM

## 2023-01-30 RX ORDER — TAMSULOSIN HYDROCHLORIDE 0.4 MG/1
0.4 CAPSULE ORAL DAILY
Qty: 30 CAPSULE | Refills: 11 | Status: ON HOLD | OUTPATIENT
Start: 2023-01-31 | End: 2023-06-20

## 2023-01-30 RX ADMIN — SODIUM CHLORIDE: 0.9 INJECTION, SOLUTION INTRAVENOUS at 12:01

## 2023-01-30 RX ADMIN — TAMSULOSIN HYDROCHLORIDE 0.4 MG: 0.4 CAPSULE ORAL at 09:01

## 2023-01-30 NOTE — DISCHARGE INSTRUCTIONS
Follow up with urologist as ordered, stay well hydrated, strain urine, notify MD of any stones noted

## 2023-01-30 NOTE — ASSESSMENT & PLAN NOTE
Will plan for conservative management and trial of passage with close follow up.   Stone is very close to the bladder and has already traveled past the enlarged uterus.   As long as pain is controlled and she has no concern for infection can try to pass stone.   Discussed that can give it a few more weeks as long as symptoms are controlled and she is not having any signs of infection.     Discussed the importance with her of straining all of her urine to ensure that stone has passed.    Aggressive fluid hydration  Flomax if ok from OB standpoint  Can be discharged home  Will see her in clinic on 2/1  If has not passed stone in next week or clinical picture changes can plan for ureteroscopy however would like to avoid anesthesia as well as further exposure to radiation if possible

## 2023-01-30 NOTE — HPI
Agnes Matos is a 27 y.o. female who is currently 18 weeks pregnant.     Presented to the ED yesterday with back pain. Had presented with similar symptoms on 1/25. Was discharged home with abx and US at that time had shown no hydro. Urine culture no growth.      Now CT shows a 8 mm right UVJ stone with mild hydro. UA with only 4 WBC and 14 squams. WBC 13, other labs unremarkable. WBC down this am to 9.9.    Pain is currently well controlled.   This is her first stone episode.   Has been straining urine. Had some minor bladder pain while voiding this am.   No fevers.     She did undergo an ex lap at 14 weeks for peritonitis symptoms.

## 2023-01-30 NOTE — PLAN OF CARE
01/30/23 1419   Discharge Planning   Assessment Type Discharge Planning Assessment   Resource/Environmental Concerns none   Support Systems Spouse/significant other   Equipment Currently Used at Home none   Current Living Arrangements home   Patient/Family Anticipates Transition to home;home with family   Patient/Family Anticipated Services at Transition none   DME Needed Upon Discharge  none   Discharge Plan A Home;Home with family   Discharge Plan B Home;Home with family

## 2023-01-30 NOTE — PROGRESS NOTES
[facility]  Obstetrics  Antepartum Progress Note    Patient Name: Agnes Matos  MRN: [id number]  Admission Date: 1/28/2023  Hospital Length of Stay: 0 days  Attending Physician: Sony Dinero MD  Primary Care Provider: Rose Torres MD    Subjective:     Principal Problem:Kidney stone complicating pregnancy, second trimester    HPI:  Patient doing bout better is able to tolerate p.o. ambulating well pain is improved, saw urologist this morning  .  There is no report of any successful straining of the urine.    Objective:     Vital Signs (Most Recent):  Temp: 98.3 °F (36.8 °C) (01/30/23 0830)  Pulse: 83 (01/30/23 0830)  Resp: 16 (01/30/23 0830)  BP: 110/65 (01/30/23 0830)  SpO2: 100 % (01/30/23 0830) Vital Signs (24h Range):  Temp:  [98.3 °F (36.8 °C)-99.1 °F (37.3 °C)] 98.3 °F (36.8 °C)  Pulse:  [78-94] 83  Resp:  [16-18] 16  SpO2:  [97 %-100 %] 100 %  BP: (104-110)/(64-73) 110/65     Weight: 93 kg (205 lb)  Body mass index is 36.31 kg/m².        Intake/Output Summary (Last 24 hours) at 1/30/2023 1233  Last data filed at 1/30/2023 1020  Gross per 24 hour   Intake --   Output 2850 ml   Net -2850 ml       Physical Exam  Unremarkable    Significant Labs:  Recent Lab Results         01/30/23  0506        Albumin 2.3       Alkaline Phosphatase 160       ALT 33       Anion Gap 9       AST 26       Baso # 0.02       Basophil % 0.2       BILIRUBIN TOTAL 0.6  Comment: For infants and newborns, interpretation of results should be based  on gestational age, weight and in agreement with clinical  observations.    Premature Infant recommended reference ranges:  Up to 24 hours.............<8.0 mg/dL  Up to 48 hours............<12.0 mg/dL  3-5 days..................<15.0 mg/dL  6-29 days.................<15.0 mg/dL         BUN <5       Calcium 8.0       Chloride 103       CO2 22       Creatinine 0.5       Differential Method Automated       eGFR >60.0       Eos # 0.1       Eosinophil % 0.6       Glucose 83        Gran # (ANC) 7.5       Gran % 76.1       Hematocrit 29.6       Hemoglobin 9.3       Immature Grans (Abs) 0.09  Comment: Mild elevation in immature granulocytes is non specific and   can be seen in a variety of conditions including stress response,   acute inflammation, trauma and pregnancy. Correlation with other   laboratory and clinical findings is essential.         Immature Granulocytes 0.9       Lymph # 1.5       Lymph % 15.6       MCH 25.3       MCHC 31.4       MCV 81       Mono # 0.7       Mono % 6.6       MPV 8.7       nRBC 0       Platelets 368       Potassium 3.3       PROTEIN TOTAL 6.2       RBC 3.67       RDW 14.6       Sodium 134       WBC 9.89               Assessment/Plan:     27 y.o. female  at 18w2d for:    Active Diagnoses:    Diagnosis Date Noted POA    PRINCIPAL PROBLEM:  Kidney stone complicating pregnancy, second trimester [O26.832, N20.0] 2023 Unknown      Problems Resolved During this Admission:       Eighteen weeks with suspected kidney stone discharge home follow up with Urology as needed continue Flomax      Sony Dinero MD  Obstetrics  [facility]

## 2023-01-30 NOTE — CONSULTS
[facility]  Urology  Consult Note    Patient Name: Agnes Maots  MRN: [id number]  Admission Date: 2023  Hospital Length of Stay: 0   Code Status: Full Code   Attending Provider: Sony Dinero MD   Consulting Provider: Marry Erickson MD  Primary Care Physician: Rose Torres MD  Principal Problem:Kidney stone complicating pregnancy, second trimester    Inpatient consult to Urology  Consult performed by: Marry Erickson MD  Consult ordered by: Gómez Tavarez MD        Subjective:     HPI:  Agnes Matos is a 27 y.o. female who is currently 18 weeks pregnant.     Presented to the ED yesterday with back pain. Had presented with similar symptoms on . Was discharged home with abx and US at that time had shown no hydro. Urine culture no growth.      Now CT shows a 8 mm right UVJ stone with mild hydro. UA with only 4 WBC and 14 squams. WBC 13, other labs unremarkable. WBC down this am to 9.9.    Pain is currently well controlled.   This is her first stone episode.   Has been straining urine. Had some minor bladder pain while voiding this am.   No fevers.     She did undergo an ex lap at 14 weeks for peritonitis symptoms.       Past Medical History:   Diagnosis Date    Anxiety     Cholestasis during pregnancy     Crohn's colitis     Depression     Endometriosis     GERD (gastroesophageal reflux disease)     IBS (irritable bowel syndrome)     TMJ (dislocation of temporomandibular joint)        Past Surgical History:   Procedure Laterality Date     SECTION  17 &     Two births    COLON SURGERY  21    A small part along with tbe small bowel    COLONOSCOPY N/A 2022    Procedure: COLONOSCOPY;  Surgeon: Gregorio Bourne MD;  Location: [facility];  Service: Gastroenterology;  Laterality: N/A;    DIAGNOSTIC LAPAROSCOPY Left 2022    Procedure: LAPAROSCOPY, DIAGNOSTIC- SAMPLING OF PERITONEAL FLUID;  Surgeon: Yajaira Siegel MD;  Location: [facility];   Service: OB/GYN;  Laterality: Left;    presacral neurectomy      2020    SMALL INTESTINE SURGERY  09/21/2021    partial with partial large bowel    SURGICAL REMOVAL OF ENDOMETRIOSIS      TONSILLECTOMY  2006    TUBAL LIGATION  10-26-19    After c-secrtion       Review of patient's allergies indicates:   Allergen Reactions    Opioids - morphine analogues Itching    Stelara [ustekinumab] Hives    Adalimumab Rash, Itching and Hives     joint ache      Renflexis [infliximab-abda] Rash     Other reaction(s): Rash or Itch, Other: joint ache, Rash or Itch, Other: joint ache       Family History    None         Tobacco Use    Smoking status: Never    Smokeless tobacco: Never   Substance and Sexual Activity    Alcohol use: Not Currently     Comment: Social once every six months    Drug use: Never    Sexual activity: Yes     Partners: Male     Birth control/protection: See Surgical Hx     Comment: Tubal ligation       Review of Systems   Constitutional:  Negative for fever.   Gastrointestinal:  Negative for nausea.   Genitourinary:  Negative for difficulty urinating, dysuria, flank pain, frequency, hematuria, nocturia and urgency.     Objective:     Temp:  [98.3 °F (36.8 °C)-99.1 °F (37.3 °C)] 98.6 °F (37 °C)  Pulse:  [78-94] 87  Resp:  [17-21] 18  SpO2:  [97 %-100 %] 98 %  BP: ()/(59-73) 105/64     Body mass index is 36.31 kg/m².           Drains       None                   Physical Exam  Vitals reviewed.   Constitutional:       General: She is not in acute distress.     Appearance: Normal appearance. She is not ill-appearing.   HENT:      Head: Normocephalic and atraumatic.   Eyes:      General: No scleral icterus.  Cardiovascular:      Rate and Rhythm: Normal rate and regular rhythm.   Pulmonary:      Effort: Pulmonary effort is normal. No respiratory distress.   Abdominal:      Palpations: Abdomen is soft.   Skin:     Coloration: Skin is not jaundiced.   Neurological:      General: No focal deficit  present.      Mental Status: She is alert and oriented to person, place, and time.   Psychiatric:         Mood and Affect: Mood normal.         Behavior: Behavior normal.       Significant Labs:    BMP:  Recent Labs   Lab 01/25/23  1110 01/28/23  2316 01/30/23  0506   * 132* 134*   K 3.6 3.4* 3.3*    100 103   CO2 24 21* 22*   BUN 9 11 <5*   CREATININE 0.6 0.6 0.5   CALCIUM 9.0 9.0 8.0*       CBC:  Recent Labs   Lab 01/25/23  1110 01/28/23  2316 01/30/23  0506   WBC 11.18 13.29* 9.89   HGB 10.8* 11.2* 9.3*   HCT 33.8* 34.9* 29.6*    451* 368       Urine Culture:   Recent Labs   Lab 01/25/23  1252   LABURIN No growth     Urine Studies:   Recent Labs   Lab 01/25/23  1252 01/29/23  0210   COLORU Yellow  Yellow Yellow   APPEARANCEUA Hazy*  Hazy* Hazy*   PHUR 6.0  6.0 6.0   SPECGRAV 1.030  1.030 1.010   PROTEINUA 1+*  1+* Negative   GLUCUA Negative  Negative Negative   KETONESU Negative  Negative Negative   BILIRUBINUA Negative  Negative Negative   OCCULTUA 3+*  3+* Negative   NITRITE Negative  Negative Negative   UROBILINOGEN 2.0-3.0*  2.0-3.0* Negative   LEUKOCYTESUR 2+*  2+* 1+*   RBCUA 17* 4   WBCUA 16* 5   BACTERIA Occasional Negative   SQUAMEPITHEL 19 14   HYALINECASTS 50* 18*     All pertinent labs results from the past 24 hours have been reviewed.    Significant Imaging:  All pertinent imaging results/findings from the past 24 hours have been reviewed.      Assessment and Plan:     * Kidney stone complicating pregnancy, second trimester  Will plan for conservative management and trial of passage with close follow up.   Stone is very close to the bladder and has already traveled past the enlarged uterus.   As long as pain is controlled and she has no concern for infection can try to pass stone.   Discussed that can give it a few more weeks as long as symptoms are controlled and she is not having any signs of infection.     Discussed the importance with her of straining all of her  urine to ensure that stone has passed.    Aggressive fluid hydration  Flomax if ok from OB standpoint  Can be discharged home  Will see her in clinic on 2/1  If has not passed stone in next week or clinical picture changes can plan for ureteroscopy however would like to avoid anesthesia as well as further exposure to radiation if possible         VTE Risk Mitigation (From admission, onward)         Ordered     IP VTE HIGH RISK PATIENT  Once         01/29/23 3750                Thank you for your consult. I will sign off. Please contact us if you have any additional questions.    Marry Erickson MD  Urology  [facility]

## 2023-01-30 NOTE — SUBJECTIVE & OBJECTIVE
Past Medical History:   Diagnosis Date    Anxiety     Cholestasis during pregnancy     Crohn's colitis     Depression     Endometriosis     GERD (gastroesophageal reflux disease)     IBS (irritable bowel syndrome)     TMJ (dislocation of temporomandibular joint)        Past Surgical History:   Procedure Laterality Date     SECTION  17 &     Two births    COLON SURGERY  21    A small part along with tbe small bowel    COLONOSCOPY N/A 2022    Procedure: COLONOSCOPY;  Surgeon: Gregorio Bourne MD;  Location: [facility] ENDO;  Service: Gastroenterology;  Laterality: N/A;    DIAGNOSTIC LAPAROSCOPY Left 2022    Procedure: LAPAROSCOPY, DIAGNOSTIC- SAMPLING OF PERITONEAL FLUID;  Surgeon: Yajaira Siegel MD;  Location: [facility] OR;  Service: OB/GYN;  Laterality: Left;    presacral neurectomy          SMALL INTESTINE SURGERY  2021    partial with partial large bowel    SURGICAL REMOVAL OF ENDOMETRIOSIS      TONSILLECTOMY      TUBAL LIGATION  10-26-19    After c-secrtion       Review of patient's allergies indicates:   Allergen Reactions    Opioids - morphine analogues Itching    Stelara [ustekinumab] Hives    Adalimumab Rash, Itching and Hives     joint ache      Renflexis [infliximab-abda] Rash     Other reaction(s): Rash or Itch, Other: joint ache, Rash or Itch, Other: joint ache       Family History    None         Tobacco Use    Smoking status: Never    Smokeless tobacco: Never   Substance and Sexual Activity    Alcohol use: Not Currently     Comment: Social once every six months    Drug use: Never    Sexual activity: Yes     Partners: Male     Birth control/protection: See Surgical Hx     Comment: Tubal ligation       Review of Systems   Constitutional:  Negative for fever.   Gastrointestinal:  Negative for nausea.   Genitourinary:  Negative for difficulty urinating, dysuria, flank pain, frequency, hematuria, nocturia and urgency.     Objective:     Temp:  [98.3 °F (36.8  °C)-99.1 °F (37.3 °C)] 98.6 °F (37 °C)  Pulse:  [78-94] 87  Resp:  [17-21] 18  SpO2:  [97 %-100 %] 98 %  BP: ()/(59-73) 105/64     Body mass index is 36.31 kg/m².           Drains       None                   Physical Exam  Vitals reviewed.   Constitutional:       General: She is not in acute distress.     Appearance: Normal appearance. She is not ill-appearing.   HENT:      Head: Normocephalic and atraumatic.   Eyes:      General: No scleral icterus.  Cardiovascular:      Rate and Rhythm: Normal rate and regular rhythm.   Pulmonary:      Effort: Pulmonary effort is normal. No respiratory distress.   Abdominal:      Palpations: Abdomen is soft.   Skin:     Coloration: Skin is not jaundiced.   Neurological:      General: No focal deficit present.      Mental Status: She is alert and oriented to person, place, and time.   Psychiatric:         Mood and Affect: Mood normal.         Behavior: Behavior normal.       Significant Labs:    BMP:  Recent Labs   Lab 01/25/23  1110 01/28/23  2316 01/30/23  0506   * 132* 134*   K 3.6 3.4* 3.3*    100 103   CO2 24 21* 22*   BUN 9 11 <5*   CREATININE 0.6 0.6 0.5   CALCIUM 9.0 9.0 8.0*       CBC:  Recent Labs   Lab 01/25/23  1110 01/28/23  2316 01/30/23  0506   WBC 11.18 13.29* 9.89   HGB 10.8* 11.2* 9.3*   HCT 33.8* 34.9* 29.6*    451* 368       Urine Culture:   Recent Labs   Lab 01/25/23  1252   LABURIN No growth     Urine Studies:   Recent Labs   Lab 01/25/23  1252 01/29/23  0210   COLORU Yellow  Yellow Yellow   APPEARANCEUA Hazy*  Hazy* Hazy*   PHUR 6.0  6.0 6.0   SPECGRAV 1.030  1.030 1.010   PROTEINUA 1+*  1+* Negative   GLUCUA Negative  Negative Negative   KETONESU Negative  Negative Negative   BILIRUBINUA Negative  Negative Negative   OCCULTUA 3+*  3+* Negative   NITRITE Negative  Negative Negative   UROBILINOGEN 2.0-3.0*  2.0-3.0* Negative   LEUKOCYTESUR 2+*  2+* 1+*   RBCUA 17* 4   WBCUA 16* 5   BACTERIA Occasional Negative    SQUAMEPITHEL 19 14   HYALINECASTS 50* 18*     All pertinent labs results from the past 24 hours have been reviewed.    Significant Imaging:  All pertinent imaging results/findings from the past 24 hours have been reviewed.

## 2023-01-30 NOTE — PLAN OF CARE
01/30/23 1419   Final Note   Assessment Type Final Discharge Note   Anticipated Discharge Disposition Home   What phone number can be called within the next 1-3 days to see how you are doing after discharge? [phone]   Post-Acute Status   Discharge Delays None known at this time     Discharge orders and chart reviewed with no further post-acute discharge needs identified at this time.  At this time, patient is cleared for discharge from Case Management standpoint.

## 2023-01-31 NOTE — PROGRESS NOTES
[facility] Note    PCP: Rose Torres MD    Chief Complaint: kidney stone    SUBJECTIVE:       History of Present Illness:  Agnes Matos is a 27 y.o. female who presents to clinic for kidney stone. She is Established  to our clinic.     Patient is 18 weeks pregnant and diagnosed with 8 mm right UVJ stone via CT. Seen as consult at [facility].     Hasn't passed stone. Had episode of severe pain last night, which has now resolved. Took a dose of pain meds.   No fevers.   Has been straining.     She had a laparoscopic ex lap a few weeks ago for peritonitis symptoms.     Last urine culture: no documented UTIs    Lab Results   Component Value Date    CREATININE 0.5 2023     Past medical, family, and social history reviewed as documented in chart with pertinent positive medical, family, and social history detailed in HPI.    Review of patient's allergies indicates:   Allergen Reactions    Opioids - morphine analogues Itching    Stelara [ustekinumab] Hives    Adalimumab Rash, Itching and Hives     joint ache      Renflexis [infliximab-abda] Rash     Other reaction(s): Rash or Itch, Other: joint ache, Rash or Itch, Other: joint ache       Past Medical History:   Diagnosis Date    Anxiety     Cholestasis during pregnancy     Crohn's colitis     Depression     Endometriosis     GERD (gastroesophageal reflux disease)     IBS (irritable bowel syndrome)     TMJ (dislocation of temporomandibular joint)      Past Surgical History:   Procedure Laterality Date     SECTION  17 &     Two births    COLON SURGERY  21    A small part along with tbe small bowel    COLONOSCOPY N/A 2022    Procedure: COLONOSCOPY;  Surgeon: Gregorio Bourne MD;  Location: [facility];  Service: Gastroenterology;  Laterality: N/A;    DIAGNOSTIC LAPAROSCOPY Left 2022    Procedure: LAPAROSCOPY, DIAGNOSTIC- SAMPLING OF PERITONEAL FLUID;  Surgeon: Yajaira Siegel MD;  Location: [facility] OR;  Service:  "OB/GYN;  Laterality: Left;    presacral neurectomy      2020    SMALL INTESTINE SURGERY  09/21/2021    partial with partial large bowel    SURGICAL REMOVAL OF ENDOMETRIOSIS      TONSILLECTOMY  2006    TUBAL LIGATION  10-26-19    After c-secrtion     No family history on file.  Social History     Tobacco Use    Smoking status: Never    Smokeless tobacco: Never   Substance Use Topics    Alcohol use: Not Currently     Comment: Social once every six months    Drug use: Never        Review of Systems    OBJECTIVE:     Anticoagulation:  no    Estimated body mass index is 36.31 kg/m² as calculated from the following:    Height as of 1/29/23: 5' 3" (1.6 m).    Weight as of 1/29/23: 93 kg (205 lb).    Vital Signs (Most Recent)  Pulse: 97 (02/01/23 1117)  BP: 103/66 (02/01/23 1117)    Physical Exam    BMP  Lab Results   Component Value Date     (L) 01/30/2023    K 3.3 (L) 01/30/2023     01/30/2023    CO2 22 (L) 01/30/2023    BUN <5 (L) 01/30/2023    CREATININE 0.5 01/30/2023    CALCIUM 8.0 (L) 01/30/2023    ANIONGAP 9 01/30/2023    ESTGFRAFRICA 125 06/07/2022    EGFRNONAA 108 06/07/2022       Lab Results   Component Value Date    WBC 9.89 01/30/2023    HGB 9.3 (L) 01/30/2023    HCT 29.6 (L) 01/30/2023    MCV 81 (L) 01/30/2023     01/30/2023       Imaging:  Per HPI    ASSESSMENT     1. Kidney stone complicating pregnancy, second trimester    2. Crohn's disease of intestine with complication        PLAN:     - Symptoms still controlled   - Will continue with trial of passage  - If has not passed stone, plan for intervention on [facility]  - Discussed return to ED precautions including uncontrolled pain or fevers   - Continue Flomax   - Reinforced importance of straining urine     Marry Erickson MD       "

## 2023-02-01 ENCOUNTER — OFFICE VISIT (OUTPATIENT)
Dept: UROLOGY | Facility: CLINIC | Age: 28
End: 2023-02-01
Payer: OTHER GOVERNMENT

## 2023-02-01 VITALS — DIASTOLIC BLOOD PRESSURE: 66 MMHG | HEART RATE: 97 BPM | SYSTOLIC BLOOD PRESSURE: 103 MMHG

## 2023-02-01 DIAGNOSIS — N20.0 KIDNEY STONE COMPLICATING PREGNANCY, SECOND TRIMESTER: Primary | ICD-10-CM

## 2023-02-01 DIAGNOSIS — K50.919 CROHN'S DISEASE OF INTESTINE WITH COMPLICATION: ICD-10-CM

## 2023-02-01 DIAGNOSIS — O26.832 KIDNEY STONE COMPLICATING PREGNANCY, SECOND TRIMESTER: Primary | ICD-10-CM

## 2023-02-01 PROCEDURE — 99213 PR OFFICE/OUTPT VISIT, EST, LEVL III, 20-29 MIN: ICD-10-PCS | Mod: S$PBB,,, | Performed by: STUDENT IN AN ORGANIZED HEALTH CARE EDUCATION/TRAINING PROGRAM

## 2023-02-01 PROCEDURE — 99999 PR PBB SHADOW E&M-EST. PATIENT-LVL III: CPT | Mod: PBBFAC,,, | Performed by: STUDENT IN AN ORGANIZED HEALTH CARE EDUCATION/TRAINING PROGRAM

## 2023-02-01 PROCEDURE — 99999 PR PBB SHADOW E&M-EST. PATIENT-LVL III: ICD-10-PCS | Mod: PBBFAC,,, | Performed by: STUDENT IN AN ORGANIZED HEALTH CARE EDUCATION/TRAINING PROGRAM

## 2023-02-01 PROCEDURE — 99213 OFFICE O/P EST LOW 20 MIN: CPT | Mod: S$PBB,,, | Performed by: STUDENT IN AN ORGANIZED HEALTH CARE EDUCATION/TRAINING PROGRAM

## 2023-02-01 PROCEDURE — 99213 OFFICE O/P EST LOW 20 MIN: CPT | Mod: PBBFAC,PN | Performed by: STUDENT IN AN ORGANIZED HEALTH CARE EDUCATION/TRAINING PROGRAM

## 2023-02-02 ENCOUNTER — HOSPITAL ENCOUNTER (EMERGENCY)
Facility: HOSPITAL | Age: 28
Discharge: HOME | End: 2023-02-02
Attending: STUDENT IN AN ORGANIZED HEALTH CARE EDUCATION/TRAINING PROGRAM
Payer: OTHER GOVERNMENT

## 2023-02-02 VITALS
DIASTOLIC BLOOD PRESSURE: 64 MMHG | OXYGEN SATURATION: 98 % | RESPIRATION RATE: 18 BRPM | BODY MASS INDEX: 37.73 KG/M2 | HEART RATE: 87 BPM | WEIGHT: 205 LBS | TEMPERATURE: 98 F | HEIGHT: 62 IN | SYSTOLIC BLOOD PRESSURE: 92 MMHG

## 2023-02-02 DIAGNOSIS — R10.9 ABDOMINAL PAIN: ICD-10-CM

## 2023-02-02 DIAGNOSIS — O26.839 KIDNEY STONE COMPLICATING PREGNANCY: ICD-10-CM

## 2023-02-02 DIAGNOSIS — R11.2 NAUSEA AND VOMITING, UNSPECIFIED VOMITING TYPE: Primary | ICD-10-CM

## 2023-02-02 DIAGNOSIS — N20.0 KIDNEY STONE COMPLICATING PREGNANCY: ICD-10-CM

## 2023-02-02 LAB
ALBUMIN SERPL BCP-MCNC: 2.7 G/DL (ref 3.5–5.2)
ALP SERPL-CCNC: 183 U/L (ref 55–135)
ALT SERPL W/O P-5'-P-CCNC: 27 U/L (ref 10–44)
ANION GAP SERPL CALC-SCNC: 9 MMOL/L (ref 8–16)
AST SERPL-CCNC: 20 U/L (ref 10–40)
BASOPHILS # BLD AUTO: 0.01 K/UL (ref 0–0.2)
BASOPHILS NFR BLD: 0.1 % (ref 0–1.9)
BILIRUB SERPL-MCNC: 0.4 MG/DL (ref 0.1–1)
BILIRUB UR QL STRIP: NEGATIVE
BUN SERPL-MCNC: 6 MG/DL (ref 6–20)
CALCIUM SERPL-MCNC: 8.8 MG/DL (ref 8.7–10.5)
CHLORIDE SERPL-SCNC: 101 MMOL/L (ref 95–110)
CLARITY UR: ABNORMAL
CO2 SERPL-SCNC: 23 MMOL/L (ref 23–29)
COLOR UR: YELLOW
CREAT SERPL-MCNC: 0.4 MG/DL (ref 0.5–1.4)
DIFFERENTIAL METHOD: ABNORMAL
EOSINOPHIL # BLD AUTO: 0 K/UL (ref 0–0.5)
EOSINOPHIL NFR BLD: 0.1 % (ref 0–8)
ERYTHROCYTE [DISTWIDTH] IN BLOOD BY AUTOMATED COUNT: 14.5 % (ref 11.5–14.5)
EST. GFR  (NO RACE VARIABLE): >60 ML/MIN/1.73 M^2
GLUCOSE SERPL-MCNC: 111 MG/DL (ref 70–110)
GLUCOSE SERPL-MCNC: 114 MG/DL (ref 70–110)
GLUCOSE UR QL STRIP: NEGATIVE
HCT VFR BLD AUTO: 32.9 % (ref 37–48.5)
HGB BLD-MCNC: 10.6 G/DL (ref 12–16)
HGB UR QL STRIP: NEGATIVE
IMM GRANULOCYTES # BLD AUTO: 0.14 K/UL (ref 0–0.04)
IMM GRANULOCYTES NFR BLD AUTO: 1 % (ref 0–0.5)
KETONES UR QL STRIP: NEGATIVE
LEUKOCYTE ESTERASE UR QL STRIP: NEGATIVE
LIPASE SERPL-CCNC: 29 U/L (ref 4–60)
LYMPHOCYTES # BLD AUTO: 1.3 K/UL (ref 1–4.8)
LYMPHOCYTES NFR BLD: 9.4 % (ref 18–48)
MCH RBC QN AUTO: 25.3 PG (ref 27–31)
MCHC RBC AUTO-ENTMCNC: 32.2 G/DL (ref 32–36)
MCV RBC AUTO: 79 FL (ref 82–98)
MONOCYTES # BLD AUTO: 0.4 K/UL (ref 0.3–1)
MONOCYTES NFR BLD: 3.1 % (ref 4–15)
NEUTROPHILS # BLD AUTO: 11.8 K/UL (ref 1.8–7.7)
NEUTROPHILS NFR BLD: 86.3 % (ref 38–73)
NITRITE UR QL STRIP: NEGATIVE
NRBC BLD-RTO: 0 /100 WBC
PH UR STRIP: 7 [PH] (ref 5–8)
PLATELET # BLD AUTO: 454 K/UL (ref 150–450)
PMV BLD AUTO: 9 FL (ref 9.2–12.9)
POTASSIUM SERPL-SCNC: 3.4 MMOL/L (ref 3.5–5.1)
PROT SERPL-MCNC: 7.3 G/DL (ref 6–8.4)
PROT UR QL STRIP: NEGATIVE
RBC # BLD AUTO: 4.19 M/UL (ref 4–5.4)
SODIUM SERPL-SCNC: 133 MMOL/L (ref 136–145)
SP GR UR STRIP: 1.01 (ref 1–1.03)
URN SPEC COLLECT METH UR: ABNORMAL
UROBILINOGEN UR STRIP-ACNC: NEGATIVE EU/DL
WBC # BLD AUTO: 13.68 K/UL (ref 3.9–12.7)

## 2023-02-02 PROCEDURE — 81003 URINALYSIS AUTO W/O SCOPE: CPT | Performed by: STUDENT IN AN ORGANIZED HEALTH CARE EDUCATION/TRAINING PROGRAM

## 2023-02-02 PROCEDURE — 99285 EMERGENCY DEPT VISIT HI MDM: CPT | Mod: 25

## 2023-02-02 PROCEDURE — 83690 ASSAY OF LIPASE: CPT | Performed by: STUDENT IN AN ORGANIZED HEALTH CARE EDUCATION/TRAINING PROGRAM

## 2023-02-02 PROCEDURE — 96361 HYDRATE IV INFUSION ADD-ON: CPT

## 2023-02-02 PROCEDURE — 85025 COMPLETE CBC W/AUTO DIFF WBC: CPT | Performed by: STUDENT IN AN ORGANIZED HEALTH CARE EDUCATION/TRAINING PROGRAM

## 2023-02-02 PROCEDURE — 63600175 PHARM REV CODE 636 W HCPCS: Performed by: STUDENT IN AN ORGANIZED HEALTH CARE EDUCATION/TRAINING PROGRAM

## 2023-02-02 PROCEDURE — 96375 TX/PRO/DX INJ NEW DRUG ADDON: CPT

## 2023-02-02 PROCEDURE — 96374 THER/PROPH/DIAG INJ IV PUSH: CPT

## 2023-02-02 PROCEDURE — 80053 COMPREHEN METABOLIC PANEL: CPT | Performed by: STUDENT IN AN ORGANIZED HEALTH CARE EDUCATION/TRAINING PROGRAM

## 2023-02-02 PROCEDURE — 25000003 PHARM REV CODE 250: Performed by: STUDENT IN AN ORGANIZED HEALTH CARE EDUCATION/TRAINING PROGRAM

## 2023-02-02 RX ORDER — HYDROMORPHONE HYDROCHLORIDE 1 MG/ML
1 INJECTION, SOLUTION INTRAMUSCULAR; INTRAVENOUS; SUBCUTANEOUS
Status: COMPLETED | OUTPATIENT
Start: 2023-02-02 | End: 2023-02-02

## 2023-02-02 RX ORDER — ONDANSETRON 2 MG/ML
8 INJECTION INTRAMUSCULAR; INTRAVENOUS
Status: COMPLETED | OUTPATIENT
Start: 2023-02-02 | End: 2023-02-02

## 2023-02-02 RX ORDER — ONDANSETRON 4 MG/1
4 TABLET, FILM COATED ORAL EVERY 6 HOURS
Qty: 12 TABLET | Refills: 0 | Status: ON HOLD | OUTPATIENT
Start: 2023-02-02 | End: 2023-06-20

## 2023-02-02 RX ADMIN — SODIUM CHLORIDE 1000 ML: 9 INJECTION, SOLUTION INTRAVENOUS at 03:02

## 2023-02-02 RX ADMIN — ONDANSETRON 8 MG: 2 INJECTION INTRAMUSCULAR; INTRAVENOUS at 04:02

## 2023-02-02 RX ADMIN — HYDROMORPHONE HYDROCHLORIDE 1 MG: 1 INJECTION, SOLUTION INTRAMUSCULAR; INTRAVENOUS; SUBCUTANEOUS at 04:02

## 2023-02-02 NOTE — DISCHARGE INSTRUCTIONS

## 2023-02-02 NOTE — ED PROVIDER NOTES
Encounter Date: 2023       History     Chief Complaint   Patient presents with    Abdominal Pain     Patient recently discharged from here 23. 18 weeks 5 days pregnant.    Nausea    Vomiting     27-year-old female who is 18 weeks and 5 days pregnant presents for left-sided flank pain radiating to the mid abdomen, ongoing since last night, not improved by her home Percocet, associated with nausea and vomiting.  She is had a complicated pregnancy including an ex lap for peritonitis with bowel adhesions, although cultures from that surgery in 2022 had no growth to date 5 days.  She also has a history of Crohn's disease but recent endoscopy in 2022 showed no evidence of inflammatory bowel disease.  She also has a history of right-sided nephrolithiasis, proximally 8 mm stone, causing right hydronephrosis, diagnosed a week ago which she has not passed at home.  She reports 2 loose bowel movements yesterday.  She denies dysuria hematuria urgency or frequency.  She is currently nauseous.    Review of patient's allergies indicates:   Allergen Reactions    Stelara [ustekinumab] Hives    Adalimumab Rash, Itching and Hives     joint ache      Renflexis [infliximab-abda] Rash     Other reaction(s): Rash or Itch, Other: joint ache, Rash or Itch, Other: joint ache     Past Medical History:   Diagnosis Date    Anxiety     Cholestasis during pregnancy     Crohn's colitis     Depression     Endometriosis     GERD (gastroesophageal reflux disease)     IBS (irritable bowel syndrome)     TMJ (dislocation of temporomandibular joint)      Past Surgical History:   Procedure Laterality Date     SECTION  17 &     Two births    COLON SURGERY  21    A small part along with tbe small bowel    COLONOSCOPY N/A 2022    Procedure: COLONOSCOPY;  Surgeon: Gregorio Bourne MD;  Location: [facility];  Service: Gastroenterology;  Laterality: N/A;    DIAGNOSTIC LAPAROSCOPY Left 2022     Procedure: LAPAROSCOPY, DIAGNOSTIC- SAMPLING OF PERITONEAL FLUID;  Surgeon: Yajaira Siegel MD;  Location: [facility];  Service: OB/GYN;  Laterality: Left;    presacral neurectomy      2020    SMALL INTESTINE SURGERY  09/21/2021    partial with partial large bowel    SURGICAL REMOVAL OF ENDOMETRIOSIS      TONSILLECTOMY  2006    TUBAL LIGATION  10-26-19    After c-secrtion     No family history on file.  Social History     Tobacco Use    Smoking status: Never    Smokeless tobacco: Never   Substance Use Topics    Alcohol use: Not Currently     Comment: Social once every six months    Drug use: Never     Review of Systems   Constitutional:  Negative for activity change, appetite change, chills, fever and unexpected weight change.   HENT:  Negative for dental problem and drooling.    Eyes:  Negative for discharge and itching.   Respiratory:  Negative for cough, chest tightness, shortness of breath, wheezing and stridor.    Cardiovascular:  Negative for chest pain, palpitations and leg swelling.   Gastrointestinal:  Positive for abdominal pain, nausea and vomiting. Negative for abdominal distention and diarrhea.   Genitourinary:  Negative for difficulty urinating, dysuria, frequency and urgency.   Musculoskeletal:  Negative for back pain, gait problem and joint swelling.   Neurological:  Negative for dizziness, syncope, numbness and headaches.   Psychiatric/Behavioral:  Negative for agitation, behavioral problems and confusion.      Physical Exam     Initial Vitals [02/02/23 0332]   BP Pulse Resp Temp SpO2   116/69 107 (!) 22 97.3 °F (36.3 °C) 99 %      MAP       --         Physical Exam    Nursing note and vitals reviewed.  Constitutional: She appears well-developed and well-nourished. She is not diaphoretic.   HENT:   Head: Normocephalic and atraumatic.   Mouth/Throat: Oropharynx is clear and moist.   Eyes: EOM are normal. Pupils are equal, round, and reactive to light. Right eye exhibits no discharge. Left eye exhibits  no discharge.   Neck: No tracheal deviation present.   Normal range of motion.  Cardiovascular:  Normal rate, regular rhythm and intact distal pulses.           Pulmonary/Chest: No respiratory distress. She has no wheezes. She exhibits no tenderness.   Abdominal: Abdomen is soft. She exhibits no distension and no mass. There is abdominal tenderness. There is no rebound and no guarding.   Musculoskeletal:         General: No tenderness or edema. Normal range of motion.      Cervical back: Normal range of motion.     Neurological: She is alert and oriented to person, place, and time. She has normal strength. No cranial nerve deficit or sensory deficit. GCS eye subscore is 4. GCS verbal subscore is 5. GCS motor subscore is 6.   Skin: Skin is warm and dry. No rash noted.   Psychiatric: She has a normal mood and affect. Her behavior is normal. Thought content normal.       ED Course   Procedures  Labs Reviewed   URINALYSIS, REFLEX TO URINE CULTURE - Abnormal; Notable for the following components:       Result Value    Appearance, UA Hazy (*)     All other components within normal limits    Narrative:     Specimen Source->Urine   COMPREHENSIVE METABOLIC PANEL - Abnormal; Notable for the following components:    Sodium 133 (*)     Potassium 3.4 (*)     Glucose 114 (*)     Creatinine 0.4 (*)     Albumin 2.7 (*)     Alkaline Phosphatase 183 (*)     All other components within normal limits   CBC W/ AUTO DIFFERENTIAL - Abnormal; Notable for the following components:    WBC 13.68 (*)     Hemoglobin 10.6 (*)     Hematocrit 32.9 (*)     MCV 79 (*)     MCH 25.3 (*)     Platelets 454 (*)     MPV 9.0 (*)     Immature Granulocytes 1.0 (*)     Gran # (ANC) 11.8 (*)     Immature Grans (Abs) 0.14 (*)     Gran % 86.3 (*)     Lymph % 9.4 (*)     Mono % 3.1 (*)     All other components within normal limits   POCT GLUCOSE - Abnormal; Notable for the following components:    POC Glucose 111 (*)     All other components within normal limits    LIPASE   POCT GLUCOSE MONITORING CONTINUOUS          Imaging Results              US Retroperitoneal Complete (Final result)  Result time 02/02/23 05:03:35   Procedure changed from US Abdomen Pelvis Doppler Study Limited (retroperitoneal)     Final result by Jessica Waller MD (02/02/23 05:03:35)                   Narrative:    EXAM DESCRIPTION:    US RETROPERITONEAL COMPLETE    CLINICAL HISTORY:    27 years  Female  kidney stone    COMPARISON:    CT 1/29/2023    TECHNIQUE:    Complete retroperitoneal ultrasound    FINDINGS:    The right kidney measures 12.8 x 5.2 x 5.8 cm, the left 13.0 x 4.9 x 5.8 cm. No renal calculus, mass, or hydronephrosis. Previously described mild right hydronephrosis has resolved. Subjective urinary bladder wall thickening likely reflects incomplete distention. The cortical medullary differentiation is preserved bilaterally. No perinephric fluid collection    IMPRESSION:    Resolution of previously described hydronephrosis. The kidneys appear unremarkable bilaterally        Electronically signed by:  Jessica Waller DO  2/2/2023 5:03 AM CST Workstation: [id number]                                     US OB Limited 1 Or More Gestations (Final result)  Result time 02/02/23 05:05:28   Procedure changed from US OB 14+ Wks, TransAbd, Single Gestation     Final result by Jessica Waller MD (02/02/23 05:05:28)                   Narrative:    EXAM DESCRIPTION:    US OB LIMITED 1 OR MORE GESTATIONS    CLINICAL HISTORY:    27 years  Female  abd pain, fetal heart tones    COMPARISON:    None    TECHNIQUE:    Limited OB ultrasound    FINDINGS:    Single live intrauterine gestation in breech presentation. Ultrasound age 20 weeks 2 days. Heart tones 148 bpm. Estimated fetal weight 336.47 g.    HC to a.c.: 1.11    FL to BPD: 61.26    FL to a.c.: 18.35    FL to HC: 16.47    The placenta is fundal in location with a grade 1 echotexture and no evidence for previa. Cervical length 2.9 cm. A detailed  fetal anatomic assessment was not performed. Ultrasound age 20 weeks 2 days    IMPRESSION:    Single live IUP as above. Nonemergent obstetric follow-up        Electronically signed by:  Jessica Waller DO  2/2/2023 5:05 AM CST Workstation: [id number]                                     Medications   sodium chloride 0.9% bolus 1,000 mL 1,000 mL (0 mLs Intravenous Stopped 2/2/23 1069)   HYDROmorphone injection 1 mg (1 mg Intravenous Given 2/2/23 0416)   ondansetron injection 8 mg (8 mg Intravenous Given 2/2/23 0416)                            27-year-old female who is 18 weeks and 5 days pregnant presents for left flank and midepigastric abdominal pain, ongoing since last night.  Vitals are normal.  She is uncomfortable my exam, currently nauseous tenderness to palpation along the left flank without rebound or guarding.  Patient's pain treated with morphine and nausea with Zofran with improvement in symptoms.  I gave her 1 L IV fluids for hydration.  Renal ultrasound showed resolution of the hydronephrosis on the right and no evidence of hydronephrosis on the left.  Obstetrics ultrasound showed single live intrauterine pregnancy with normal heart tones and no complications.  Workup notable for mild leukocytosis with left shift unclear etiology, normal hemoglobin.  CMP without evidence of renal dysfunction or electrolyte abnormality.  UA without evidence of urinary tract infection, doubt pyelonephritis or infected nephrolithiasis.  On reexamination, patient's abdomen is soft and nontender, doubt acute intra-abdominal process.  Doubt Crohn's flare given recent endoscopy.  Doubt bowel obstruction given patient is now tolerating p.o. Case discussed with the patient's urologist, who advised that this may not be related to her right-sided nephrolithiasis given the patient's having left-sided flank pain.  The patient does have surgery scheduled on the 17th for her nephrolithiasis if she does not passed the stone prior to  then.  Given the patient is feeling well and does not have signs of peritonitis or acute intra-abdominal infection this time, she elected to go home and return if symptoms worsen.  Will give her Zofran for nausea.  She has pain medicine at home.  Stable for discharge to outpatient follow-up and return precautions.      Clinical Impression:   Final diagnoses:  [O26.839, N20.0] Kidney stone complicating pregnancy  [R10.9] Abdominal pain  [R11.2] Nausea and vomiting, unspecified vomiting type (Primary)        ED Disposition Condition    Discharge Stable          ED Prescriptions       Medication Sig Dispense Start Date End Date Auth. Provider    ondansetron (ZOFRAN) 4 MG tablet Take 1 tablet (4 mg total) by mouth every 6 (six) hours. 12 tablet 2/2/2023 -- Alex Skinner MD          Follow-up Information       Follow up With Specialties Details Why Contact Info    Sony Dinero MD Obstetrics, Obstetrics and Gynecology Call today To discuss recent ED visit, To recheck today's symptoms [id number] [facility]  [place] [facility] [address]  [phone]               Alex Skinner MD  02/02/23 0636

## 2023-02-02 NOTE — DISCHARGE SUMMARY
formerly [facility]  Obstetrics  Discharge Summary      Patient Name: Agnes Matos  MRN: [id number]  Admission Date: 1/28/2023  Hospital Length of Stay: 0 days  Discharge Date and Time: 1/30/2023  2:25 PM  Attending Physician: No att. providers found   Discharging Provider: Sony Dinero MD   Primary Care Provider: Rose Torres MD    HPI: No notes on file  Patient admitted with sizable kidney stone located near bladder for pain control and urologic consultation      * No surgery found *     Hospital Course:   No notes on file     Stable     Consults (From admission, onward)        Status Ordering Provider     Inpatient consult to Urology  Once        Provider:  Marry Erickson MD    Completed BRITTA REED          Final Active Diagnoses:    Diagnosis Date Noted POA    PRINCIPAL PROBLEM:  Kidney stone complicating pregnancy, second trimester [O26.832, N20.0] 01/29/2023 Yes      Problems Resolved During this Admission:        Significant Diagnostic Studies: Labs: All labs within the past 24 hours have been reviewed  No results found for: GROUPTRH          Immunizations     None          This patient has no babies on file.  Pending Diagnostic Studies:     None          Discharged Condition: good    Disposition: Home or Self Care    Follow Up:   Follow-up Information     Sony Dinero MD Follow up.    Specialties: Obstetrics, Obstetrics and Gynecology  Why: routine     see urologist  Contact information:  [address]  [place], [place] [facility] [address]  [phone]                       Patient Instructions:      Diet Adult Regular     Remove dressing in 24 hours     Notify your health care provider if you experience any of the following:  temperature >100.4     Notify your health care provider if you experience any of the following:  persistent nausea and vomiting or diarrhea     Notify your health care provider if you experience any of the following:  severe  uncontrolled pain     Notify your health care provider if you experience any of the following:  redness, tenderness, or signs of infection (pain, swelling, redness, odor or green/yellow discharge around incision site)     Activity as tolerated     Medications:  Discharge Medication List as of 1/30/2023  2:15 PM      START taking these medications    Details   tamsulosin (FLOMAX) 0.4 mg Cap Take 1 capsule (0.4 mg total) by mouth once daily., Starting Tue 1/31/2023, Until Wed 1/31/2024, Normal         STOP taking these medications       cefUROXime (CEFTIN) 500 MG tablet Comments:   Reason for Stopping:         oxyCODONE-acetaminophen (PERCOCET) 5-325 mg per tablet Comments:   Reason for Stopping:         PNV no.95/ferrous fum/folic ac (PRENATAL ORAL) Comments:   Reason for Stopping:               Sony Dinero MD  Obstetrics  Former[facility]

## 2023-02-08 ENCOUNTER — OFFICE VISIT (OUTPATIENT)
Dept: UROLOGY | Facility: CLINIC | Age: 28
End: 2023-02-08
Payer: OTHER GOVERNMENT

## 2023-02-08 ENCOUNTER — HOSPITAL ENCOUNTER (EMERGENCY)
Facility: HOSPITAL | Age: 28
Discharge: HOME | End: 2023-02-08
Attending: EMERGENCY MEDICINE
Payer: OTHER GOVERNMENT

## 2023-02-08 VITALS
DIASTOLIC BLOOD PRESSURE: 68 MMHG | WEIGHT: 205 LBS | BODY MASS INDEX: 37.73 KG/M2 | HEART RATE: 84 BPM | TEMPERATURE: 98 F | SYSTOLIC BLOOD PRESSURE: 105 MMHG | OXYGEN SATURATION: 97 % | HEIGHT: 62 IN | RESPIRATION RATE: 16 BRPM

## 2023-02-08 VITALS
SYSTOLIC BLOOD PRESSURE: 107 MMHG | DIASTOLIC BLOOD PRESSURE: 71 MMHG | WEIGHT: 205 LBS | HEART RATE: 64 BPM | BODY MASS INDEX: 37.73 KG/M2 | HEIGHT: 62 IN

## 2023-02-08 DIAGNOSIS — K50.919 CROHN'S DISEASE OF INTESTINE WITH COMPLICATION: ICD-10-CM

## 2023-02-08 DIAGNOSIS — O26.832 KIDNEY STONE COMPLICATING PREGNANCY, SECOND TRIMESTER: Primary | ICD-10-CM

## 2023-02-08 DIAGNOSIS — F06.4 ANXIETY DISORDER DUE TO GENERAL MEDICAL CONDITION: ICD-10-CM

## 2023-02-08 DIAGNOSIS — R10.9 ABDOMINAL PAIN: ICD-10-CM

## 2023-02-08 DIAGNOSIS — N80.9 ENDOMETRIOSIS, UNSPECIFIED: ICD-10-CM

## 2023-02-08 DIAGNOSIS — N20.0 KIDNEY STONE COMPLICATING PREGNANCY, SECOND TRIMESTER: Primary | ICD-10-CM

## 2023-02-08 DIAGNOSIS — K21.9 GASTROESOPHAGEAL REFLUX DISEASE WITHOUT ESOPHAGITIS: ICD-10-CM

## 2023-02-08 LAB
ALBUMIN SERPL BCP-MCNC: 2.9 G/DL (ref 3.5–5.2)
ALP SERPL-CCNC: 203 U/L (ref 55–135)
ALT SERPL W/O P-5'-P-CCNC: 32 U/L (ref 10–44)
ANION GAP SERPL CALC-SCNC: 10 MMOL/L (ref 8–16)
AST SERPL-CCNC: 29 U/L (ref 10–40)
BASOPHILS # BLD AUTO: 0.02 K/UL (ref 0–0.2)
BASOPHILS NFR BLD: 0.2 % (ref 0–1.9)
BILIRUB SERPL-MCNC: 0.5 MG/DL (ref 0.1–1)
BILIRUB UR QL STRIP: NEGATIVE
BUN SERPL-MCNC: 8 MG/DL (ref 6–20)
CALCIUM SERPL-MCNC: 9.2 MG/DL (ref 8.7–10.5)
CHLORIDE SERPL-SCNC: 101 MMOL/L (ref 95–110)
CLARITY UR: CLEAR
CO2 SERPL-SCNC: 23 MMOL/L (ref 23–29)
COLOR UR: YELLOW
CREAT SERPL-MCNC: 0.5 MG/DL (ref 0.5–1.4)
DIFFERENTIAL METHOD: ABNORMAL
EOSINOPHIL # BLD AUTO: 0 K/UL (ref 0–0.5)
EOSINOPHIL NFR BLD: 0.3 % (ref 0–8)
ERYTHROCYTE [DISTWIDTH] IN BLOOD BY AUTOMATED COUNT: 14.3 % (ref 11.5–14.5)
EST. GFR  (NO RACE VARIABLE): >60 ML/MIN/1.73 M^2
GLUCOSE SERPL-MCNC: 105 MG/DL (ref 70–110)
GLUCOSE UR QL STRIP: NEGATIVE
HCT VFR BLD AUTO: 35.1 % (ref 37–48.5)
HGB BLD-MCNC: 11.4 G/DL (ref 12–16)
HGB UR QL STRIP: NEGATIVE
IMM GRANULOCYTES # BLD AUTO: 0.11 K/UL (ref 0–0.04)
IMM GRANULOCYTES NFR BLD AUTO: 0.9 % (ref 0–0.5)
KETONES UR QL STRIP: NEGATIVE
LACTATE SERPL-SCNC: 1.4 MMOL/L (ref 0.5–1.9)
LEUKOCYTE ESTERASE UR QL STRIP: NEGATIVE
LIPASE SERPL-CCNC: 28 U/L (ref 4–60)
LYMPHOCYTES # BLD AUTO: 1.6 K/UL (ref 1–4.8)
LYMPHOCYTES NFR BLD: 13.4 % (ref 18–48)
MCH RBC QN AUTO: 25.7 PG (ref 27–31)
MCHC RBC AUTO-ENTMCNC: 32.5 G/DL (ref 32–36)
MCV RBC AUTO: 79 FL (ref 82–98)
MONOCYTES # BLD AUTO: 0.5 K/UL (ref 0.3–1)
MONOCYTES NFR BLD: 4 % (ref 4–15)
NEUTROPHILS # BLD AUTO: 9.8 K/UL (ref 1.8–7.7)
NEUTROPHILS NFR BLD: 81.2 % (ref 38–73)
NITRITE UR QL STRIP: NEGATIVE
NRBC BLD-RTO: 0 /100 WBC
PH UR STRIP: 7 [PH] (ref 5–8)
PLATELET # BLD AUTO: 540 K/UL (ref 150–450)
PMV BLD AUTO: 8.9 FL (ref 9.2–12.9)
POTASSIUM SERPL-SCNC: 3.7 MMOL/L (ref 3.5–5.1)
PROT SERPL-MCNC: 7.5 G/DL (ref 6–8.4)
PROT UR QL STRIP: ABNORMAL
RBC # BLD AUTO: 4.44 M/UL (ref 4–5.4)
SODIUM SERPL-SCNC: 134 MMOL/L (ref 136–145)
SP GR UR STRIP: 1.02 (ref 1–1.03)
URN SPEC COLLECT METH UR: ABNORMAL
UROBILINOGEN UR STRIP-ACNC: NEGATIVE EU/DL
WBC # BLD AUTO: 12.05 K/UL (ref 3.9–12.7)

## 2023-02-08 PROCEDURE — 99999 PR PBB SHADOW E&M-EST. PATIENT-LVL II: CPT | Mod: PBBFAC,,, | Performed by: STUDENT IN AN ORGANIZED HEALTH CARE EDUCATION/TRAINING PROGRAM

## 2023-02-08 PROCEDURE — 99285 EMERGENCY DEPT VISIT HI MDM: CPT | Mod: 25

## 2023-02-08 PROCEDURE — 63600175 PHARM REV CODE 636 W HCPCS: Performed by: STUDENT IN AN ORGANIZED HEALTH CARE EDUCATION/TRAINING PROGRAM

## 2023-02-08 PROCEDURE — 83605 ASSAY OF LACTIC ACID: CPT | Performed by: STUDENT IN AN ORGANIZED HEALTH CARE EDUCATION/TRAINING PROGRAM

## 2023-02-08 PROCEDURE — 99213 OFFICE O/P EST LOW 20 MIN: CPT | Mod: S$PBB,,, | Performed by: STUDENT IN AN ORGANIZED HEALTH CARE EDUCATION/TRAINING PROGRAM

## 2023-02-08 PROCEDURE — 81003 URINALYSIS AUTO W/O SCOPE: CPT | Performed by: STUDENT IN AN ORGANIZED HEALTH CARE EDUCATION/TRAINING PROGRAM

## 2023-02-08 PROCEDURE — 99212 OFFICE O/P EST SF 10 MIN: CPT | Mod: PBBFAC,PN | Performed by: STUDENT IN AN ORGANIZED HEALTH CARE EDUCATION/TRAINING PROGRAM

## 2023-02-08 PROCEDURE — 99999 PR PBB SHADOW E&M-EST. PATIENT-LVL II: ICD-10-PCS | Mod: PBBFAC,,, | Performed by: STUDENT IN AN ORGANIZED HEALTH CARE EDUCATION/TRAINING PROGRAM

## 2023-02-08 PROCEDURE — 96376 TX/PRO/DX INJ SAME DRUG ADON: CPT

## 2023-02-08 PROCEDURE — 63600175 PHARM REV CODE 636 W HCPCS: Performed by: EMERGENCY MEDICINE

## 2023-02-08 PROCEDURE — 99213 PR OFFICE/OUTPT VISIT, EST, LEVL III, 20-29 MIN: ICD-10-PCS | Mod: S$PBB,,, | Performed by: STUDENT IN AN ORGANIZED HEALTH CARE EDUCATION/TRAINING PROGRAM

## 2023-02-08 PROCEDURE — 80053 COMPREHEN METABOLIC PANEL: CPT | Performed by: STUDENT IN AN ORGANIZED HEALTH CARE EDUCATION/TRAINING PROGRAM

## 2023-02-08 PROCEDURE — 96374 THER/PROPH/DIAG INJ IV PUSH: CPT

## 2023-02-08 PROCEDURE — 85025 COMPLETE CBC W/AUTO DIFF WBC: CPT | Performed by: STUDENT IN AN ORGANIZED HEALTH CARE EDUCATION/TRAINING PROGRAM

## 2023-02-08 PROCEDURE — 83690 ASSAY OF LIPASE: CPT | Performed by: STUDENT IN AN ORGANIZED HEALTH CARE EDUCATION/TRAINING PROGRAM

## 2023-02-08 RX ORDER — MORPHINE SULFATE 2 MG/ML
2 INJECTION, SOLUTION INTRAMUSCULAR; INTRAVENOUS
Status: COMPLETED | OUTPATIENT
Start: 2023-02-08 | End: 2023-02-08

## 2023-02-08 RX ORDER — MORPHINE SULFATE 4 MG/ML
4 INJECTION, SOLUTION INTRAMUSCULAR; INTRAVENOUS
Status: COMPLETED | OUTPATIENT
Start: 2023-02-08 | End: 2023-02-08

## 2023-02-08 RX ORDER — OXYCODONE AND ACETAMINOPHEN 5; 325 MG/1; MG/1
1 TABLET ORAL EVERY 6 HOURS PRN
Qty: 12 TABLET | Refills: 0 | Status: ON HOLD | OUTPATIENT
Start: 2023-02-08 | End: 2023-02-10 | Stop reason: SDUPTHER

## 2023-02-08 RX ADMIN — MORPHINE SULFATE 4 MG: 4 INJECTION, SOLUTION INTRAMUSCULAR; INTRAVENOUS at 05:02

## 2023-02-08 RX ADMIN — MORPHINE SULFATE 2 MG: 2 INJECTION, SOLUTION INTRAMUSCULAR; INTRAVENOUS at 07:02

## 2023-02-08 NOTE — PROGRESS NOTES
[facility] Note    PCP: Rose Torres MD    Chief Complaint: kidney stone    SUBJECTIVE:       History of Present Illness:  Agnes Matos is a 27 y.o. female who presents to clinic for kidney stone. She is Established  to our clinic.     Has been to ED multiple times. Was in ED this am. Having now severe abdominal pain and n/v.  UA negative for blood, leuks, nitrite   Has been straining urine and hasn't passed stone.     23  Patient is 18 weeks pregnant and diagnosed with 8 mm right UVJ stone via CT. Seen as consult at [facility].     Hasn't passed stone. Had episode of severe pain last night, which has now resolved. Took a dose of pain meds.   No fevers.   Has been straining.     She had a laparoscopic ex lap a few weeks ago for peritonitis symptoms.     Last urine culture: no documented UTIs    Lab Results   Component Value Date    CREATININE 0.5 2023     Past medical, family, and social history reviewed as documented in chart with pertinent positive medical, family, and social history detailed in HPI.    Review of patient's allergies indicates:   Allergen Reactions    Methotrexate     Morphine     Stelara [ustekinumab] Hives    Vedolizumab Hives    Adalimumab Rash, Itching and Hives     joint ache      Renflexis [infliximab-abda] Rash     Other reaction(s): Rash or Itch, Other: joint ache, Rash or Itch, Other: joint ache       Past Medical History:   Diagnosis Date    Anxiety     Cholestasis during pregnancy     Crohn's colitis     Depression     Endometriosis     GERD (gastroesophageal reflux disease)     IBS (irritable bowel syndrome)     TMJ (dislocation of temporomandibular joint)      Past Surgical History:   Procedure Laterality Date     SECTION  17 &     Two births    COLON SURGERY  21    A small part along with tbe small bowel    COLONOSCOPY N/A 2022    Procedure: COLONOSCOPY;  Surgeon: Gregorio Bourne MD;  [facility];  Service:  "Gastroenterology;  Laterality: N/A;    DIAGNOSTIC LAPAROSCOPY Left 12/20/2022    Procedure: LAPAROSCOPY, DIAGNOSTIC- SAMPLING OF PERITONEAL FLUID;  Surgeon: Yajaira Siegel MD;  Location: [facility];  Service: OB/GYN;  Laterality: Left;    presacral neurectomy      2020    SMALL INTESTINE SURGERY  09/21/2021    partial with partial large bowel    SURGICAL REMOVAL OF ENDOMETRIOSIS      TONSILLECTOMY  2006    TUBAL LIGATION  10-26-19    After c-secrtion     No family history on file.  Social History     Tobacco Use    Smoking status: Never    Smokeless tobacco: Never   Substance Use Topics    Alcohol use: Not Currently     Comment: Social once every six months    Drug use: Never        Review of Systems    OBJECTIVE:     Anticoagulation:  no    Estimated body mass index is 37.49 kg/m² as calculated from the following:    Height as of this encounter: 5' 2" (1.575 m).    Weight as of this encounter: 93 kg (205 lb).    Vital Signs (Most Recent)  Pulse: 64 (02/08/23 1305)  BP: 107/71 (02/08/23 1305)    Physical Exam  Vitals reviewed.   Constitutional:       General: She is in acute distress.      Appearance: Normal appearance. She is ill-appearing. She is not diaphoretic.   HENT:      Head: Normocephalic and atraumatic.   Eyes:      General: No scleral icterus.  Cardiovascular:      Rate and Rhythm: Normal rate and regular rhythm.   Pulmonary:      Effort: Pulmonary effort is normal. No respiratory distress.   Abdominal:      Palpations: Abdomen is soft.   Skin:     Coloration: Skin is not jaundiced.   Neurological:      General: No focal deficit present.      Mental Status: She is alert and oriented to person, place, and time.   Psychiatric:         Mood and Affect: Mood normal.         Behavior: Behavior normal.       BMP  Lab Results   Component Value Date     (L) 02/08/2023    K 3.7 02/08/2023     02/08/2023    CO2 23 02/08/2023    BUN 8 02/08/2023    CREATININE 0.5 02/08/2023    CALCIUM 9.2 02/08/2023    " ANIONGAP 10 02/08/2023    ESTGFRAFRICA 125 06/07/2022    EGFRNONAA 108 06/07/2022       Lab Results   Component Value Date    WBC 12.05 02/08/2023    HGB 11.4 (L) 02/08/2023    HCT 35.1 (L) 02/08/2023    MCV 79 (L) 02/08/2023     (H) 02/08/2023       Imaging:  Per HPI    ASSESSMENT     1. Kidney stone complicating pregnancy, second trimester    2. Gastroesophageal reflux disease without esophagitis    3. Crohn's disease of intestine with complication    4. 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 Endometriosis, unspecified      PLAN:     - Patient is in distress and has had multiple trips to ED  - Moving her ureteroscopy up to 2/10  - As it has to be done at [facility] this is the soonest we can do it   - Urine not infected   - If she is having this much distress I advised that she will need to go back to ED or reach out to OB to be admitted for symptom control     Marry Erickson MD

## 2023-02-08 NOTE — H&P (VIEW-ONLY)
[facility] Note    PCP: Rose Torres MD    Chief Complaint: kidney stone    SUBJECTIVE:       History of Present Illness:  Agnes Matos is a 27 y.o. female who presents to clinic for kidney stone. She is Established  to our clinic.     Has been to ED multiple times. Was in ED this am. Having now severe abdominal pain and n/v.  UA negative for blood, leuks, nitrite   Has been straining urine and hasn't passed stone.     23  Patient is 18 weeks pregnant and diagnosed with 8 mm right UVJ stone via CT. Seen as consult at [facility].     Hasn't passed stone. Had episode of severe pain last night, which has now resolved. Took a dose of pain meds.   No fevers.   Has been straining.     She had a laparoscopic ex lap a few weeks ago for peritonitis symptoms.     Last urine culture: no documented UTIs    Lab Results   Component Value Date    CREATININE 0.5 2023     Past medical, family, and social history reviewed as documented in chart with pertinent positive medical, family, and social history detailed in HPI.    Review of patient's allergies indicates:   Allergen Reactions    Methotrexate     Morphine     Stelara [ustekinumab] Hives    Vedolizumab Hives    Adalimumab Rash, Itching and Hives     joint ache      Renflexis [infliximab-abda] Rash     Other reaction(s): Rash or Itch, Other: joint ache, Rash or Itch, Other: joint ache       Past Medical History:   Diagnosis Date    Anxiety     Cholestasis during pregnancy     Crohn's colitis     Depression     Endometriosis     GERD (gastroesophageal reflux disease)     IBS (irritable bowel syndrome)     TMJ (dislocation of temporomandibular joint)      Past Surgical History:   Procedure Laterality Date     SECTION  17 &     Two births    COLON SURGERY  21    A small part along with tbe small bowel    COLONOSCOPY N/A 2022    Procedure: COLONOSCOPY;  Surgeon: Gregorio Bourne MD;  Location: [facility];  Service:  "Gastroenterology;  Laterality: N/A;    DIAGNOSTIC LAPAROSCOPY Left 12/20/2022    Procedure: LAPAROSCOPY, DIAGNOSTIC- SAMPLING OF PERITONEAL FLUID;  Surgeon: Yajaira Siegel MD;  Location: [facility];  Service: OB/GYN;  Laterality: Left;    presacral neurectomy      2020    SMALL INTESTINE SURGERY  09/21/2021    partial with partial large bowel    SURGICAL REMOVAL OF ENDOMETRIOSIS      TONSILLECTOMY  2006    TUBAL LIGATION  10-26-19    After c-secrtion     No family history on file.  Social History     Tobacco Use    Smoking status: Never    Smokeless tobacco: Never   Substance Use Topics    Alcohol use: Not Currently     Comment: Social once every six months    Drug use: Never        Review of Systems    OBJECTIVE:     Anticoagulation:  no    Estimated body mass index is 37.49 kg/m² as calculated from the following:    Height as of this encounter: 5' 2" (1.575 m).    Weight as of this encounter: 93 kg (205 lb).    Vital Signs (Most Recent)  Pulse: 64 (02/08/23 1305)  BP: 107/71 (02/08/23 1305)    Physical Exam  Vitals reviewed.   Constitutional:       General: She is in acute distress.      Appearance: Normal appearance. She is ill-appearing. She is not diaphoretic.   HENT:      Head: Normocephalic and atraumatic.   Eyes:      General: No scleral icterus.  Cardiovascular:      Rate and Rhythm: Normal rate and regular rhythm.   Pulmonary:      Effort: Pulmonary effort is normal. No respiratory distress.   Abdominal:      Palpations: Abdomen is soft.   Skin:     Coloration: Skin is not jaundiced.   Neurological:      General: No focal deficit present.      Mental Status: She is alert and oriented to person, place, and time.   Psychiatric:         Mood and Affect: Mood normal.         Behavior: Behavior normal.       BMP  Lab Results   Component Value Date     (L) 02/08/2023    K 3.7 02/08/2023     02/08/2023    CO2 23 02/08/2023    BUN 8 02/08/2023    CREATININE 0.5 02/08/2023    CALCIUM 9.2 02/08/2023    " ANIONGAP 10 02/08/2023    ESTGFRAFRICA 125 06/07/2022    EGFRNONAA 108 06/07/2022       Lab Results   Component Value Date    WBC 12.05 02/08/2023    HGB 11.4 (L) 02/08/2023    HCT 35.1 (L) 02/08/2023    MCV 79 (L) 02/08/2023     (H) 02/08/2023       Imaging:  Per HPI    ASSESSMENT     1. Kidney stone complicating pregnancy, second trimester    2. Gastroesophageal reflux disease without esophagitis    3. Crohn's disease of intestine with complication    4. 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 Endometriosis, unspecified      PLAN:     - Patient is in distress and has had multiple trips to ED  - Moving her ureteroscopy up to 2/10  - As it has to be done at [facility] this is the soonest we can do it   - Urine not infected   - If she is having this much distress I advised that she will need to go back to ED or reach out to OB to be admitted for symptom control     Marry Erickson MD

## 2023-02-08 NOTE — ED PROVIDER NOTES
Encounter Date: 2023       History     Chief Complaint   Patient presents with    Abdominal Pain    Diarrhea    Vomiting     HPI    26 yo F  at 19 weeks 4 days gestation based on IVF presenting with abdominal pain. Pain as been persistent for the last 2 weeks. She was diagnosed with an 8mm right sided kidney stone and has had a hospital stay and multiple ED presentations for symptoms. She reports that pain is worsening and is epigastric radiating to right flank associated with nausea with 2 episodes of NBNB vomiting yesterday and several bouts of diarrhea. Pain medication and antiemetics are no longer helping with symptoms. She had an anatomy scan yesterday that she was told was normal. Denies urinary symptoms, vaginal bleeding/discharge/rush of fluid. Denies chest pain, shortness of breath.     Review of patient's allergies indicates:   Allergen Reactions    Methotrexate     Morphine     Stelara [ustekinumab] Hives    Vedolizumab Hives    Adalimumab Rash, Itching and Hives     joint ache      Renflexis [infliximab-abda] Rash     Other reaction(s): Rash or Itch, Other: joint ache, Rash or Itch, Other: joint ache     Past Medical History:   Diagnosis Date    Anxiety     Cholestasis during pregnancy     Crohn's colitis     Depression     Endometriosis     GERD (gastroesophageal reflux disease)     IBS (irritable bowel syndrome)     TMJ (dislocation of temporomandibular joint)      Past Surgical History:   Procedure Laterality Date     SECTION  17 &     Two births    COLON SURGERY  21    A small part along with tbe small bowel    COLONOSCOPY N/A 2022    Procedure: COLONOSCOPY;  Surgeon: Gregorio Bourne MD;  Location: [facility] ENDO;  Service: Gastroenterology;  Laterality: N/A;    DIAGNOSTIC LAPAROSCOPY Left 2022    Procedure: LAPAROSCOPY, DIAGNOSTIC- SAMPLING OF PERITONEAL FLUID;  Surgeon: Yajaira Siegel MD;  Location: [facility] OR;  Service: OB/GYN;  Laterality: Left;     presacral neurectomy      2020    SMALL INTESTINE SURGERY  09/21/2021    partial with partial large bowel    SURGICAL REMOVAL OF ENDOMETRIOSIS      TONSILLECTOMY  2006    TUBAL LIGATION  10-26-19    After c-secrtion     No family history on file.  Social History     Tobacco Use    Smoking status: Never    Smokeless tobacco: Never   Substance Use Topics    Alcohol use: Not Currently     Comment: Social once every six months    Drug use: Never     Review of Systems   All other systems reviewed and are negative.    Physical Exam     Initial Vitals [02/08/23 0440]   BP Pulse Resp Temp SpO2   111/78 98 (!) 24 97.6 °F (36.4 °C) 97 %      MAP       --         Physical Exam    Nursing note and vitals reviewed.  Constitutional: She appears well-developed and well-nourished.   Uncomfortable appearing secondary to pain   HENT:   Head: Normocephalic and atraumatic.   Eyes: Conjunctivae are normal. Pupils are equal, round, and reactive to light.   Neck: Neck supple.   Cardiovascular:  Normal rate, regular rhythm and intact distal pulses.           Pulmonary/Chest: Breath sounds normal. No respiratory distress. She has no wheezes. She has no rhonchi. She has no rales.   Abdominal: Abdomen is soft. She exhibits no distension. There is abdominal tenderness.   Gravid abdomen, epigastric tenderness, and right CVA tenderness There is no rebound and no guarding.   Musculoskeletal:         General: No edema.      Cervical back: Neck supple.     Neurological: She is alert and oriented to person, place, and time.   Skin: Skin is warm and dry. Capillary refill takes less than 2 seconds.       ED Course   Procedures  Labs Reviewed   CBC W/ AUTO DIFFERENTIAL - Abnormal; Notable for the following components:       Result Value    Hemoglobin 11.4 (*)     Hematocrit 35.1 (*)     MCV 79 (*)     MCH 25.7 (*)     Platelets 540 (*)     MPV 8.9 (*)     Immature Granulocytes 0.9 (*)     Gran # (ANC) 9.8 (*)     Immature Grans (Abs) 0.11 (*)     Gran  % 81.2 (*)     Lymph % 13.4 (*)     All other components within normal limits   COMPREHENSIVE METABOLIC PANEL - Abnormal; Notable for the following components:    Sodium 134 (*)     Albumin 2.9 (*)     Alkaline Phosphatase 203 (*)     All other components within normal limits   URINALYSIS, REFLEX TO URINE CULTURE - Abnormal; Notable for the following components:    Protein, UA Trace (*)     All other components within normal limits    Narrative:     Specimen Source->Urine   LIPASE   LACTIC ACID, PLASMA          Imaging Results              US OB 14+ Wks, TransAbd, Single Gestation (Final result)  Result time 02/08/23 06:18:10      Final result by Alexander Schaffer MD (02/08/23 06:18:10)                   Narrative:    Ultrasound pregnancy Limited    CLINICAL DATA: Pregnancy, abdominal pain    FINDINGS: Limited sonographic assessment of the gravid uterus was performed utilizing transabdominal technique.    Living intrauterine fetus is currently in breech presentation with fetal heart rate 157 bpm. The placenta is fundal with no evidence of previa or abruption. The cervix is closed and measures 3.2 cm in length. Amniotic fluid volume is subjectively normal.    By sonographic criteria, estimated gestational age is 20 weeks 6 days +/- 1 week 3 days.    Detailed morphologic survey was not performed.    IMPRESSION:  1. Living intrauterine fetus currently in breech presentation with fetal heart rate 157 bpm.  2. Fundal placenta with no previa or abruption.    Electronically signed by:  Alexander Schaffer MD  2/8/2023 6:18 AM [facility] Workstation: [id number]                                     US Retroperitoneal Complete (Final result)  Result time 02/08/23 06:20:59      Final result by Alexander Schaffer MD (02/08/23 06:20:59)                   Narrative:    Ultrasound retroperitoneum    CLINICAL DATA: Right flank pain, known ureteral calculus    FINDINGS: Sonographic assessment targeted to the kidneys and retroperitoneum is  compared to a prior ultrasound examinations from  and , as well as a CT study from .    Visualized portions of the abdominal aorta are normal in caliber.    The right kidney measures 11.6 cm in length, with normal cortical echogenicity and thickness. There is no evidence of mass, calculus, or hydronephrosis.    The left kidney measures 12.7 cm in length, with normal cortical echogenicity and thickness. There is no evidence of mass, calculus, or hydronephrosis.    Partially filled urinary bladder is unremarkable.    IMPRESSION:  1. Normal sonographic appearance of the bilateral kidneys. No evidence of hydronephrosis.    Electronically signed by:  Alexander Schaffer MD  2023 6:20 AM [facility] Workstation: [id number]                                     Medications   morphine injection 4 mg (4 mg Intravenous Given 23 0540)   morphine injection 2 mg (2 mg Intravenous Given 23 0723)      HO-IV MDM:    27 y.o. female presenting with complaint of   Chief Complaint   Patient presents with    Abdominal Pain    Diarrhea    Vomiting    26 yo F  at 19 weeks 4 days gestation based on IVF, crohn's disease, and known kidney stone presenting with abdominal pain. Pain as been persistent for the last 2 weeks. She was diagnosed with an 8mm right sided kidney stone and has had a hospital stay and multiple ED presentations for symptoms. She reports that pain is worsening and is epigastric radiating to right flank associated with nausea with 2 episodes of NBNB vomiting yesterday and several bouts of diarrhea.    Significant exam findings of   Vitals:    23 0725   BP: 105/68   Pulse: 84   Resp: 16   Temp: 98.2 °F (36.8 °C)        Differential diagnosis includes Pancreatitis, nephrolithiasis, pyelonephritis, PUD, gastroenteritis     Plan to obtain CBC, CMP, UA, Renal US, Fetal US     Patient given morphine for pain control.     Will continue to monitor with dispo pending completion of work-up  and reassessment. Patient care handed off to oncoming physician.     Rosa Torres MD   [employer]- Emergency Medicine, PGY-4                Attending Attestation:   Physician Attestation Statement for Resident:  As the supervising MD   Physician Attestation Statement: I have personally seen and examined this patient.   I agree with the above history.  -:   As the supervising MD I agree with the above PE.     As the supervising MD I agree with the above treatment, course, plan, and disposition.   -: Us shows no evidence of hydronephrosis ob us is normal  will dc with pain meds patient reports that she ran out of pain medicine rec call DR Erickson today to ask for closer intervention given size of stone and persistence of pain   I have reviewed and agree with the residents interpretation of the following: lab data and x-rays.                            Clinical Impression:   Final diagnoses:  [R10.9] Abdominal pain        ED Disposition Condition    Discharge Stable          ED Prescriptions       Medication Sig Dispense Start Date End Date Auth. Provider    oxyCODONE-acetaminophen (PERCOCET) 5-325 mg per tablet Take 1 tablet by mouth every 6 (six) hours as needed for Pain. 12 tablet 2/8/2023 -- Gómez Tavarez MD          Follow-up Information       Follow up With Specialties Details Why Contact Info    Marry Erickson MD Urology Call in 1 day for re-examination of your symptoms [address] [place] [address]  [address]  [facility] [id number] [phone]               Rosa Torres MD  Resident  02/08/23 0617       Gómez Tavarez MD  02/09/23 0310

## 2023-02-10 ENCOUNTER — ANESTHESIA (OUTPATIENT)
Dept: SURGERY | Facility: HOSPITAL | Age: 28
End: 2023-02-10
Payer: OTHER GOVERNMENT

## 2023-02-10 ENCOUNTER — ANESTHESIA EVENT (OUTPATIENT)
Dept: SURGERY | Facility: HOSPITAL | Age: 28
End: 2023-02-10
Payer: OTHER GOVERNMENT

## 2023-02-10 ENCOUNTER — HOSPITAL ENCOUNTER (OUTPATIENT)
Facility: HOSPITAL | Age: 28
Discharge: HOME | End: 2023-02-10
Attending: STUDENT IN AN ORGANIZED HEALTH CARE EDUCATION/TRAINING PROGRAM | Admitting: STUDENT IN AN ORGANIZED HEALTH CARE EDUCATION/TRAINING PROGRAM
Payer: OTHER GOVERNMENT

## 2023-02-10 VITALS
HEART RATE: 86 BPM | HEIGHT: 62 IN | DIASTOLIC BLOOD PRESSURE: 72 MMHG | WEIGHT: 205 LBS | SYSTOLIC BLOOD PRESSURE: 108 MMHG | BODY MASS INDEX: 37.73 KG/M2 | RESPIRATION RATE: 18 BRPM | TEMPERATURE: 98 F | OXYGEN SATURATION: 100 %

## 2023-02-10 DIAGNOSIS — N20.0 KIDNEY STONE: Primary | ICD-10-CM

## 2023-02-10 PROCEDURE — 25000003 PHARM REV CODE 250: Performed by: NURSE ANESTHETIST, CERTIFIED REGISTERED

## 2023-02-10 PROCEDURE — 27201423 OPTIME MED/SURG SUP & DEVICES STERILE SUPPLY: Performed by: STUDENT IN AN ORGANIZED HEALTH CARE EDUCATION/TRAINING PROGRAM

## 2023-02-10 PROCEDURE — 36000706: Performed by: STUDENT IN AN ORGANIZED HEALTH CARE EDUCATION/TRAINING PROGRAM

## 2023-02-10 PROCEDURE — 52332 PR CYSTOSCOPY,INSERT URETERAL STENT: ICD-10-PCS | Mod: 51,RT,, | Performed by: STUDENT IN AN ORGANIZED HEALTH CARE EDUCATION/TRAINING PROGRAM

## 2023-02-10 PROCEDURE — 27000673 HC TUBING BLOOD Y: Performed by: ANESTHESIOLOGY

## 2023-02-10 PROCEDURE — 71000015 HC POSTOP RECOV 1ST HR: Performed by: STUDENT IN AN ORGANIZED HEALTH CARE EDUCATION/TRAINING PROGRAM

## 2023-02-10 PROCEDURE — 52352 CYSTOURETERO W/STONE REMOVE: CPT | Mod: RT,,, | Performed by: STUDENT IN AN ORGANIZED HEALTH CARE EDUCATION/TRAINING PROGRAM

## 2023-02-10 PROCEDURE — 82360 CALCULUS ASSAY QUANT: CPT | Performed by: STUDENT IN AN ORGANIZED HEALTH CARE EDUCATION/TRAINING PROGRAM

## 2023-02-10 PROCEDURE — 00918 ANES TRURL PX URTRL CAL RMVL: CPT | Performed by: STUDENT IN AN ORGANIZED HEALTH CARE EDUCATION/TRAINING PROGRAM

## 2023-02-10 PROCEDURE — C2617 STENT, NON-COR, TEM W/O DEL: HCPCS | Performed by: STUDENT IN AN ORGANIZED HEALTH CARE EDUCATION/TRAINING PROGRAM

## 2023-02-10 PROCEDURE — D9220A PRA ANESTHESIA: ICD-10-PCS | Mod: ANES,,, | Performed by: ANESTHESIOLOGY

## 2023-02-10 PROCEDURE — 37000008 HC ANESTHESIA 1ST 15 MINUTES: Performed by: STUDENT IN AN ORGANIZED HEALTH CARE EDUCATION/TRAINING PROGRAM

## 2023-02-10 PROCEDURE — 27201107 HC STYLET, STANDARD: Performed by: ANESTHESIOLOGY

## 2023-02-10 PROCEDURE — 63600175 PHARM REV CODE 636 W HCPCS: Performed by: NURSE ANESTHETIST, CERTIFIED REGISTERED

## 2023-02-10 PROCEDURE — 36000707: Performed by: STUDENT IN AN ORGANIZED HEALTH CARE EDUCATION/TRAINING PROGRAM

## 2023-02-10 PROCEDURE — 63600175 PHARM REV CODE 636 W HCPCS: Performed by: STUDENT IN AN ORGANIZED HEALTH CARE EDUCATION/TRAINING PROGRAM

## 2023-02-10 PROCEDURE — D9220A PRA ANESTHESIA: Mod: ANES,,, | Performed by: ANESTHESIOLOGY

## 2023-02-10 PROCEDURE — 25000003 PHARM REV CODE 250: Performed by: STUDENT IN AN ORGANIZED HEALTH CARE EDUCATION/TRAINING PROGRAM

## 2023-02-10 PROCEDURE — C1773 RET DEV, INSERTABLE: HCPCS | Performed by: STUDENT IN AN ORGANIZED HEALTH CARE EDUCATION/TRAINING PROGRAM

## 2023-02-10 PROCEDURE — 52352 PR CYSTO/URETERO/PYELOSCOPY, CALCULUS TX: ICD-10-PCS | Mod: RT,,, | Performed by: STUDENT IN AN ORGANIZED HEALTH CARE EDUCATION/TRAINING PROGRAM

## 2023-02-10 PROCEDURE — C1769 GUIDE WIRE: HCPCS | Performed by: STUDENT IN AN ORGANIZED HEALTH CARE EDUCATION/TRAINING PROGRAM

## 2023-02-10 PROCEDURE — D9220A PRA ANESTHESIA: ICD-10-PCS | Mod: CRNA,,, | Performed by: NURSE ANESTHETIST, CERTIFIED REGISTERED

## 2023-02-10 PROCEDURE — 71000033 HC RECOVERY, INTIAL HOUR: Performed by: STUDENT IN AN ORGANIZED HEALTH CARE EDUCATION/TRAINING PROGRAM

## 2023-02-10 PROCEDURE — D9220A PRA ANESTHESIA: Mod: CRNA,,, | Performed by: NURSE ANESTHETIST, CERTIFIED REGISTERED

## 2023-02-10 PROCEDURE — 37000009 HC ANESTHESIA EA ADD 15 MINS: Performed by: STUDENT IN AN ORGANIZED HEALTH CARE EDUCATION/TRAINING PROGRAM

## 2023-02-10 PROCEDURE — 52332 CYSTOSCOPY AND TREATMENT: CPT | Mod: 51,RT,, | Performed by: STUDENT IN AN ORGANIZED HEALTH CARE EDUCATION/TRAINING PROGRAM

## 2023-02-10 DEVICE — IMPLANTABLE DEVICE: Type: IMPLANTABLE DEVICE | Site: URETER | Status: FUNCTIONAL

## 2023-02-10 RX ORDER — LIDOCAINE HYDROCHLORIDE 20 MG/ML
JELLY TOPICAL
Status: DISCONTINUED | OUTPATIENT
Start: 2023-02-10 | End: 2023-02-10 | Stop reason: HOSPADM

## 2023-02-10 RX ORDER — LIDOCAINE HCL/PF 100 MG/5ML
SYRINGE (ML) INTRAVENOUS
Status: DISCONTINUED | OUTPATIENT
Start: 2023-02-10 | End: 2023-02-10

## 2023-02-10 RX ORDER — ONDANSETRON 2 MG/ML
4 INJECTION INTRAMUSCULAR; INTRAVENOUS DAILY PRN
Status: DISCONTINUED | OUTPATIENT
Start: 2023-02-10 | End: 2023-02-10 | Stop reason: HOSPADM

## 2023-02-10 RX ORDER — ONDANSETRON 4 MG/1
8 TABLET, ORALLY DISINTEGRATING ORAL EVERY 8 HOURS PRN
Status: DISCONTINUED | OUTPATIENT
Start: 2023-02-10 | End: 2023-02-10 | Stop reason: HOSPADM

## 2023-02-10 RX ORDER — AMPICILLIN 500 MG/1
500 CAPSULE ORAL EVERY 6 HOURS
Qty: 12 CAPSULE | Refills: 0 | Status: SHIPPED | OUTPATIENT
Start: 2023-02-10 | End: 2023-02-13

## 2023-02-10 RX ORDER — ROCURONIUM BROMIDE 10 MG/ML
INJECTION, SOLUTION INTRAVENOUS
Status: DISCONTINUED | OUTPATIENT
Start: 2023-02-10 | End: 2023-02-10

## 2023-02-10 RX ORDER — FENTANYL CITRATE 50 UG/ML
25 INJECTION, SOLUTION INTRAMUSCULAR; INTRAVENOUS EVERY 5 MIN PRN
Status: DISCONTINUED | OUTPATIENT
Start: 2023-02-10 | End: 2023-02-10 | Stop reason: HOSPADM

## 2023-02-10 RX ORDER — SUCCINYLCHOLINE CHLORIDE 20 MG/ML
INJECTION INTRAMUSCULAR; INTRAVENOUS
Status: DISCONTINUED | OUTPATIENT
Start: 2023-02-10 | End: 2023-02-10

## 2023-02-10 RX ORDER — OXYCODONE AND ACETAMINOPHEN 5; 325 MG/1; MG/1
1 TABLET ORAL EVERY 4 HOURS PRN
Qty: 5 TABLET | Refills: 0 | Status: ON HOLD | OUTPATIENT
Start: 2023-02-10 | End: 2023-06-20

## 2023-02-10 RX ORDER — CEFAZOLIN SODIUM 2 G/50ML
2 SOLUTION INTRAVENOUS
Status: COMPLETED | OUTPATIENT
Start: 2023-02-10 | End: 2023-02-10

## 2023-02-10 RX ORDER — DIPHENHYDRAMINE HYDROCHLORIDE 50 MG/ML
12.5 INJECTION INTRAMUSCULAR; INTRAVENOUS
Status: DISCONTINUED | OUTPATIENT
Start: 2023-02-10 | End: 2023-02-10 | Stop reason: HOSPADM

## 2023-02-10 RX ORDER — ONDANSETRON 2 MG/ML
INJECTION INTRAMUSCULAR; INTRAVENOUS
Status: DISCONTINUED | OUTPATIENT
Start: 2023-02-10 | End: 2023-02-10

## 2023-02-10 RX ORDER — OXYCODONE HYDROCHLORIDE 5 MG/1
5 TABLET ORAL
Status: DISCONTINUED | OUTPATIENT
Start: 2023-02-10 | End: 2023-02-10 | Stop reason: HOSPADM

## 2023-02-10 RX ORDER — FENTANYL CITRATE 50 UG/ML
INJECTION, SOLUTION INTRAMUSCULAR; INTRAVENOUS
Status: DISCONTINUED | OUTPATIENT
Start: 2023-02-10 | End: 2023-02-10

## 2023-02-10 RX ORDER — PROPOFOL 10 MG/ML
VIAL (ML) INTRAVENOUS
Status: DISCONTINUED | OUTPATIENT
Start: 2023-02-10 | End: 2023-02-10

## 2023-02-10 RX ORDER — HYDROCODONE BITARTRATE AND ACETAMINOPHEN 5; 325 MG/1; MG/1
1 TABLET ORAL EVERY 4 HOURS PRN
Status: DISCONTINUED | OUTPATIENT
Start: 2023-02-10 | End: 2023-02-10 | Stop reason: HOSPADM

## 2023-02-10 RX ORDER — DEXAMETHASONE SODIUM PHOSPHATE 4 MG/ML
INJECTION, SOLUTION INTRA-ARTICULAR; INTRALESIONAL; INTRAMUSCULAR; INTRAVENOUS; SOFT TISSUE
Status: DISCONTINUED | OUTPATIENT
Start: 2023-02-10 | End: 2023-02-10

## 2023-02-10 RX ADMIN — ROCURONIUM BROMIDE 5 MG: 10 INJECTION, SOLUTION INTRAVENOUS at 01:02

## 2023-02-10 RX ADMIN — SODIUM CHLORIDE, SODIUM LACTATE, POTASSIUM CHLORIDE, AND CALCIUM CHLORIDE: .6; .31; .03; .02 INJECTION, SOLUTION INTRAVENOUS at 01:02

## 2023-02-10 RX ADMIN — DEXAMETHASONE SODIUM PHOSPHATE 4 MG: 4 INJECTION, SOLUTION INTRAMUSCULAR; INTRAVENOUS at 02:02

## 2023-02-10 RX ADMIN — ONDANSETRON 4 MG: 2 INJECTION INTRAMUSCULAR; INTRAVENOUS at 02:02

## 2023-02-10 RX ADMIN — CEFAZOLIN SODIUM 2 G: 2 SOLUTION INTRAVENOUS at 01:02

## 2023-02-10 RX ADMIN — PROPOFOL 150 MG: 10 INJECTION, EMULSION INTRAVENOUS at 01:02

## 2023-02-10 RX ADMIN — FENTANYL CITRATE 100 MCG: 50 INJECTION INTRAMUSCULAR; INTRAVENOUS at 01:02

## 2023-02-10 RX ADMIN — Medication 120 MG: at 01:02

## 2023-02-10 RX ADMIN — LIDOCAINE HYDROCHLORIDE 50 MG: 20 INJECTION, SOLUTION INTRAVENOUS at 01:02

## 2023-02-10 NOTE — TRANSFER OF CARE
"Anesthesia Transfer of Care Note    Patient: Agnes Matos    Procedure(s) Performed: Procedure(s) (LRB):  REMOVAL, CALCULUS, URETER, URETEROSCOPIC (Right)  CYSTOSCOPY, WITH URETERAL STENT INSERTION (Right)    Patient location: PACU    Anesthesia Type: general    Transport from OR: Transported from OR on room air with adequate spontaneous ventilation    Post pain: adequate analgesia    Post assessment: no apparent anesthetic complications    Post vital signs: stable    Level of consciousness: awake and alert    Nausea/Vomiting: no nausea/vomiting    Complications: none    Transfer of care protocol was followed      Last vitals:   Visit Vitals  BP (!) 95/54 (BP Location: Left arm, Patient Position: Lying)   Pulse 81   Temp 36.3 °C (97.4 °F) (Temporal)   Resp 18   Ht 5' 2" (1.575 m)   Wt 93 kg (205 lb)   LMP  (LMP Unknown)   SpO2 98%   Breastfeeding No   BMI 37.49 kg/m²     "

## 2023-02-10 NOTE — DISCHARGE SUMMARY
[facility]  Discharge Note  Short Stay    Admit Date: 2/10/2023    Discharge Date and Time: 02/10/2023 2:19 PM      Attending Physician: Marry Erickson MD     Discharge Provider: Marry Erickson    Diagnoses:  Active Hospital Problems    Diagnosis  POA    *Kidney stone complicating pregnancy, second trimester [O26.832, N20.0]  Yes    Crohn's disease, unspecified, without complications [K50.90]  Yes    Gastroesophageal reflux disease without esophagitis [K21.9]  Yes      Resolved Hospital Problems   No resolved problems to display.       Discharged Condition: good    Hospital Course: Patient was admitted for outpatient procedure and tolerated the procedure well with no complications. The patient was discharged home in good condition on the same day.       Final Diagnoses: Same as principal problem.    Disposition: Home or Self Care    Follow up/Patient Instructions:    Medications:  Reconciled Home Medications:   Current Discharge Medication List        START taking these medications    Details   ampicillin (PRINCIPEN) 500 MG capsule Take 1 capsule (500 mg total) by mouth every 6 (six) hours. for 3 days  Qty: 12 capsule, Refills: 0           CONTINUE these medications which have CHANGED    Details   oxyCODONE-acetaminophen (PERCOCET) 5-325 mg per tablet Take 1 tablet by mouth every 4 (four) hours as needed for Pain.  Qty: 5 tablet, Refills: 0    Comments: Quantity prescribed more than 7 day supply? No           CONTINUE these medications which have NOT CHANGED    Details   ondansetron (ZOFRAN) 4 MG tablet Take 1 tablet (4 mg total) by mouth every 6 (six) hours.  Qty: 12 tablet, Refills: 0      prenatal 25/iron fum/folic/dha (PRENATAL-1 ORAL) Take by mouth.      tamsulosin (FLOMAX) 0.4 mg Cap Take 1 capsule (0.4 mg total) by mouth once daily.  Qty: 30 capsule, Refills: 11           Discharge Procedure Orders   US Retroperitoneal Complete   Standing Status: Future Standing Exp. Date: 02/10/24      Order Specific Question Answer Comments   May the Radiologist modify the order per protocol to meet the clinical needs of the patient? Yes    Release to patient Immediate      Diet general     Call MD for:  temperature >100.4     Call MD for:  persistent nausea and vomiting     Call MD for:  severe uncontrolled pain     No dressing needed     Activity as tolerated      Follow-up Information       Marry Erickson MD Follow up in 6 month(s).    Specialty: Urology  Why: stone follow up  Contact information:  [address] [place] [address]  [address] [place] [address] [id number] [phone]                               Marry Erickson MD  Urology Department

## 2023-02-10 NOTE — OP NOTE
[facility]  Operative Note    Date: 02/10/2023    Pre-Op Diagnosis:   Right 8 mm ureteral stone  21 weeks pregnant     Post-Op Diagnosis: same    Procedure(s) Performed:   1.  Right ureteroscopy  2.  Cystoscopy  3.  Stone basket extraction   4.  Right ureteral stent placement   5.  Fluoro < 1 h    Specimen(s): stone for analysis     Staff Surgeon:  Marry Erickson MD    Anesthesia: General endotracheal anesthesia    Indications: Agnes Matos is a 27 y.o. female who is 21 weeks pregnant with a right ureteral stone, presenting for definitive stone management. She has failed conservative management. She currently does not have a JJ ureteral stent in place.      Findings: right stone present at UVJ, removed with basket, no further stone fragments seen in right ureter    Estimated Blood Loss: min    Drains: 5 Fr x 24 cm JJ ureteral stent with strings    Procedure in detail:  After informed consent was obtained, the patient was brought the the cystoscopy suite and placed in the supine position.  SCDs were applied and working.  Anesthesia was administered.  The patient was then placed in the dorsal lithotomy position and prepped and draped in the usual sterile fashion.  A lead apron was placed under the pelvis so that there was only exposure of the right kidney.     A rigid cystoscope in a 22 Fr sheath was introduced into the patient's urethra.  This passed easily.  The entire urethra was visualized which showed no strictures or masses.  Formal cystoscopy was performed which revealed no masses or lesions suspicious for malignancy, no bladder stones, no bladder diverticuli, no trabeculations.  The ureteral orifices were visualized in the normal anatomic position bilaterally and clear efflux was visualized.      A motion wire was passed up the right ureteral orifice and up into the kidney.  This passed easily and placement was confirmed using fluoro.  The cystoscope was removed keeping the guidewire in place and  the wire was secured to the drape.      An 8 Fr rigid ureteroscope was passed into the patient's bladder alongside the wire under direct vision.  It was then passed through the right ureteral orifice alongside the wire.  A stone was encountered at the level of the UVJ.  A Nitinol tipless basket was introduced through the ureteroscope.  Stone was removed.  The ureteroscope was reinserted to the UPJ and the ureter was cleared.      A 5 Fr x 24 cm JJ ureteral stent with strings was passed over the wire and up into the renal pelvis using fluoro.  When the coil appeared to be in good position in the kidney the wire was removed under continuous fluoro.  Good coils were seen in the kidney using fluoro.      The patient tolerated the procedure well and was transferred to the recovery room in stable condition.      Disposition:  The patient will follow up with a renal US in 6 months. Stent can be removed on Sunday, 2/13. Antibiotics prescribed while stent is in place.      Marry Erickson MD

## 2023-02-10 NOTE — ANESTHESIA PREPROCEDURE EVALUATION
02/10/2023  Agnes Matos is a 27 y.o., female.    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    Left ovarian cyst    11 weeks gestation of pregnancy    Acute abdomen    12 weeks gestation of pregnancy    Crohn's disease of intestine with complication    Peritonitis    Kidney stone complicating pregnancy, second trimester       Past Surgical History:   Procedure Laterality Date     SECTION  17 &     Two births    COLON SURGERY  21    A small part along with tbe small bowel    COLONOSCOPY N/A 2022    Procedure: COLONOSCOPY;  Surgeon: Gregorio Bourne MD;  Location: [facility] ENDO;  Service: Gastroenterology;  Laterality: N/A;    DIAGNOSTIC LAPAROSCOPY Left 2022    Procedure: LAPAROSCOPY, DIAGNOSTIC- SAMPLING OF PERITONEAL FLUID;  Surgeon: Yajaira Siegel MD;  Location: [facility] OR;  Service: OB/GYN;  Laterality: Left;    presacral neurectomy          SMALL INTESTINE SURGERY  2021    partial with partial large bowel    SURGICAL REMOVAL OF ENDOMETRIOSIS      TONSILLECTOMY      TUBAL LIGATION  10-26-19    After c-secrtion        Tobacco Use:  The patient  reports that she has never smoked. She has never used smokeless tobacco.     No results found for this or any previous visit.          Lab Results   Component Value Date    WBC 12.05 2023    HGB 11.4 (L) 2023    HCT 35.1 (L) 2023    MCV 79 (L) 2023     (H) 2023     BMP  Lab Results   Component Value Date     (L) 2023    K 3.7 2023     2023    CO2 23 2023    BUN 8 2023    CREATININE 0.5 2023    CALCIUM  9.2 02/08/2023    ANIONGAP 10 02/08/2023     02/08/2023     (H) 02/02/2023    GLU 83 01/30/2023       No results found for this or any previous visit.            Pre-op Assessment    I have reviewed the Patient Summary Reports.     I have reviewed the Nursing Notes. I have reviewed the NPO Status.   I have reviewed the Medications.     Review of Systems  Anesthesia Hx:  No problems with previous Anesthesia  Denies Family Hx of Anesthesia complications.   Denies Personal Hx of Anesthesia complications.   Social:  Non-Smoker    Hematology/Oncology:  Hematology Normal        EENT/Dental:  EENT/Dental Normal  Jaw Problems: (TMJ)   Cardiovascular:  Cardiovascular Normal     Pulmonary:  Pulmonary Normal    Renal/:   Chronic Renal Disease (kidney stone)    Hepatic/GI:   GERD Liver Disease, (Cholestasis during pregnancy)  Hepatic/GI Symptoms: (Crohn's colitis)    Musculoskeletal:  Musculoskeletal Normal    OB/GYN/PEDS:  18 weeks pregnant   Neurological:  Neurology Normal    Endocrine:  Endocrine Normal    Psych:   anxiety depression          Physical Exam  General: Well nourished    Airway:  Mallampati: II   Mouth Opening: Normal  TM Distance: Normal  Tongue: Normal  Neck ROM: Normal ROM    Chest/Lungs:  Clear to auscultation, Normal Respiratory Rate    Heart:  Rate: Normal  Rhythm: Regular Rhythm        Anesthesia Plan  Type of Anesthesia, risks & benefits discussed:    Anesthesia Type: Gen ETT  Intra-op Monitoring Plan: Standard ASA Monitors  Post Op Pain Control Plan: IV/PO Opioids PRN and multimodal analgesia  Induction:  IV  Airway Plan: Video  Informed Consent: Informed consent signed with the Patient and all parties understand the risks and agree with anesthesia plan.  All questions answered.   ASA Score: 2  Anesthesia Plan Notes: GETA.  RSI  The patient is pregnant.  Do not give versed or nitrous oxide.    Will need pre and post fetal monitoring     Ready For Surgery From Anesthesia Perspective.      .

## 2023-02-10 NOTE — DISCHARGE INSTRUCTIONS
FOLLOW THE WRITTEN INSTRUCTIONS I REVIEWED WITH YOU AND A COPY GIVEN TO YOU.  DO NOT DRIVE, OPERATE MACHINERY, SIGN LEGAL DOCUMENTS AND NO SHOWERS FOR 24 HOURS.  CALL THE DOCTOR IF UNABLE TO URINATE OR LARGE AMOUNT OF BLOOD IN URINE OR LARGE CLOTS.  FOLLOW THE INSTRUCTIONS DR PATHAK GAVE YOU, TO REMOVE THE URETERAL STENT ON Sunday.

## 2023-02-10 NOTE — ANESTHESIA POSTPROCEDURE EVALUATION
Anesthesia Post Evaluation    Patient: Agnes Matos    Procedure(s) Performed: Procedure(s) (LRB):  REMOVAL, CALCULUS, URETER, URETEROSCOPIC (Right)  CYSTOSCOPY, WITH URETERAL STENT INSERTION (Right)    Final Anesthesia Type: general      Patient location during evaluation: PACU  Patient participation: Yes- Able to Participate  Level of consciousness: awake and alert  Post-procedure vital signs: reviewed and stable  Pain management: adequate  Airway patency: patent    PONV status at discharge: No PONV  Anesthetic complications: no      Cardiovascular status: blood pressure returned to baseline and stable  Respiratory status: unassisted and room air  Hydration status: euvolemic  Follow-up not needed.  Comments: Patient had preop and postop fetal heart monitoring.  No problems detected.          Vitals Value Taken Time   BP 95/55 02/10/23 1445   Temp 36.3 °C (97.4 °F) 02/10/23 1424   Pulse 81 02/10/23 1453   Resp 18 02/10/23 1453   SpO2 99 % 02/10/23 1453   Vitals shown include unvalidated device data.      Event Time   Out of Recovery 14:56:00         Pain/Sanjiv Score: Sanjiv Score: 10 (2/10/2023  2:45 PM)

## 2023-02-15 ENCOUNTER — HOSPITAL ENCOUNTER (OUTPATIENT)
Dept: PREADMISSION TESTING | Facility: HOSPITAL | Age: 28
Discharge: HOME | End: 2023-02-15

## 2023-02-20 LAB
CALCIUM OXALATE DIHYDRATE MFR STONE IR: 80 %
CALCULI COMPOSITION: NORMAL
CALCULI DISCLAIMER: NORMAL
CALCULI, PLEASE NOTE: NORMAL
COLOR STONE: NORMAL
COM MFR STONE: 10 %
HYDROXYAPATITE, CALCULI: 10 %
LABORATORY COMMENT REPORT: NORMAL
LABORATORY COMMENT REPORT: NORMAL
PHOTO: NORMAL
SIZE STONE: NORMAL MM
STONE ANALYSIS-IMP: NORMAL
WT STONE: 84 MG

## 2023-06-07 LAB — STREP B PCR (OHS): NOT DETECTED

## 2023-06-18 ENCOUNTER — HOSPITAL ENCOUNTER (INPATIENT)
Facility: HOSPITAL | Age: 28
LOS: 2 days | Discharge: HOME | End: 2023-06-20
Attending: SPECIALIST | Admitting: OBSTETRICS & GYNECOLOGY
Payer: OTHER GOVERNMENT

## 2023-06-18 ENCOUNTER — ANESTHESIA EVENT (OUTPATIENT)
Dept: OBSTETRICS AND GYNECOLOGY | Facility: HOSPITAL | Age: 28
End: 2023-06-18
Payer: OTHER GOVERNMENT

## 2023-06-18 DIAGNOSIS — Z98.891 S/P REPEAT LOW TRANSVERSE C-SECTION: Primary | ICD-10-CM

## 2023-06-18 DIAGNOSIS — Z34.90 PREGNANCY: ICD-10-CM

## 2023-06-18 PROBLEM — Z37.9 NORMAL LABOR: Status: ACTIVE | Noted: 2023-06-18

## 2023-06-18 PROBLEM — O34.219 PREVIOUS CESAREAN DELIVERY AFFECTING PREGNANCY: Status: ACTIVE | Noted: 2023-06-18

## 2023-06-18 LAB
ABO + RH BLD: NORMAL
AMPHET+METHAMPHET UR QL: NEGATIVE
BACTERIA #/AREA URNS HPF: ABNORMAL /HPF
BARBITURATES UR QL SCN>200 NG/ML: NEGATIVE
BASOPHILS # BLD AUTO: 0.02 K/UL (ref 0–0.2)
BASOPHILS NFR BLD: 0.2 % (ref 0–1.9)
BENZODIAZ UR QL SCN>200 NG/ML: NEGATIVE
BILIRUB UR QL STRIP: NEGATIVE
BLD GP AB SCN CELLS X3 SERPL QL: NORMAL
BUPRENORPHINE UR QL: NEGATIVE
BZE UR QL SCN: NEGATIVE
CANNABINOIDS UR QL SCN: NEGATIVE
CLARITY UR: ABNORMAL
COLOR UR: YELLOW
CREAT UR-MCNC: 39 MG/DL (ref 15–325)
DIFFERENTIAL METHOD: ABNORMAL
EOSINOPHIL # BLD AUTO: 0.1 K/UL (ref 0–0.5)
EOSINOPHIL NFR BLD: 0.5 % (ref 0–8)
ERYTHROCYTE [DISTWIDTH] IN BLOOD BY AUTOMATED COUNT: 15.9 % (ref 11.5–14.5)
GLUCOSE UR QL STRIP: NEGATIVE
HCT VFR BLD AUTO: 27.5 % (ref 37–48.5)
HGB BLD-MCNC: 8.4 G/DL (ref 12–16)
HGB UR QL STRIP: ABNORMAL
HYALINE CASTS #/AREA URNS LPF: 3 /LPF
IMM GRANULOCYTES # BLD AUTO: 0.06 K/UL (ref 0–0.04)
IMM GRANULOCYTES NFR BLD AUTO: 0.5 % (ref 0–0.5)
KETONES UR QL STRIP: NEGATIVE
LEUKOCYTE ESTERASE UR QL STRIP: ABNORMAL
LYMPHOCYTES # BLD AUTO: 1.8 K/UL (ref 1–4.8)
LYMPHOCYTES NFR BLD: 16.6 % (ref 18–48)
MCH RBC QN AUTO: 20.7 PG (ref 27–31)
MCHC RBC AUTO-ENTMCNC: 30.5 G/DL (ref 32–36)
MCV RBC AUTO: 68 FL (ref 82–98)
MICROSCOPIC COMMENT: ABNORMAL
MONOCYTES # BLD AUTO: 0.6 K/UL (ref 0.3–1)
MONOCYTES NFR BLD: 5.6 % (ref 4–15)
NEUTROPHILS # BLD AUTO: 8.5 K/UL (ref 1.8–7.7)
NEUTROPHILS NFR BLD: 76.6 % (ref 38–73)
NITRITE UR QL STRIP: NEGATIVE
NRBC BLD-RTO: 0 /100 WBC
OPIATES UR QL SCN: NEGATIVE
PCP UR QL SCN>25 NG/ML: NEGATIVE
PH UR STRIP: 7 [PH] (ref 5–8)
PLATELET # BLD AUTO: 406 K/UL (ref 150–450)
PMV BLD AUTO: 9.4 FL (ref 9.2–12.9)
PROT UR QL STRIP: NEGATIVE
RBC # BLD AUTO: 4.06 M/UL (ref 4–5.4)
RBC #/AREA URNS HPF: 3 /HPF (ref 0–4)
SP GR UR STRIP: 1 (ref 1–1.03)
SQUAMOUS #/AREA URNS HPF: 6 /HPF
TOXICOLOGY INFORMATION: NORMAL
URN SPEC COLLECT METH UR: ABNORMAL
UROBILINOGEN UR STRIP-ACNC: NEGATIVE EU/DL
WBC # BLD AUTO: 11.04 K/UL (ref 3.9–12.7)
WBC #/AREA URNS HPF: 3 /HPF (ref 0–5)

## 2023-06-18 PROCEDURE — 63600175 PHARM REV CODE 636 W HCPCS: Performed by: SPECIALIST

## 2023-06-18 PROCEDURE — 86920 COMPATIBILITY TEST SPIN: CPT | Performed by: SPECIALIST

## 2023-06-18 PROCEDURE — 71000033 HC RECOVERY, INTIAL HOUR: Performed by: OBSTETRICS & GYNECOLOGY

## 2023-06-18 PROCEDURE — C9290 INJ, BUPIVACAINE LIPOSOME: HCPCS | Performed by: OBSTETRICS & GYNECOLOGY

## 2023-06-18 PROCEDURE — 36000685 HC CESAREAN SECTION LEVEL I

## 2023-06-18 PROCEDURE — 71000039 HC RECOVERY, EACH ADD'L HOUR: Performed by: OBSTETRICS & GYNECOLOGY

## 2023-06-18 PROCEDURE — 59514 PRA REAN DELIVERY ONLY: ICD-10-PCS | Mod: QY,ANES,, | Performed by: ANESTHESIOLOGY

## 2023-06-18 PROCEDURE — 63600175 PHARM REV CODE 636 W HCPCS: Performed by: NURSE ANESTHETIST, CERTIFIED REGISTERED

## 2023-06-18 PROCEDURE — 37000008 HC ANESTHESIA 1ST 15 MINUTES: Performed by: OBSTETRICS & GYNECOLOGY

## 2023-06-18 PROCEDURE — 25000003 PHARM REV CODE 250: Performed by: OBSTETRICS & GYNECOLOGY

## 2023-06-18 PROCEDURE — 59514 PRA REAN DELIVERY ONLY: ICD-10-PCS | Mod: QX,CRNA,, | Performed by: NURSE ANESTHETIST, CERTIFIED REGISTERED

## 2023-06-18 PROCEDURE — 59514 CESAREAN DELIVERY ONLY: CPT | Mod: QX,CRNA,, | Performed by: NURSE ANESTHETIST, CERTIFIED REGISTERED

## 2023-06-18 PROCEDURE — 59514 CESAREAN DELIVERY ONLY: CPT | Mod: QY,ANES,, | Performed by: ANESTHESIOLOGY

## 2023-06-18 PROCEDURE — 80307 DRUG TEST PRSMV CHEM ANLYZR: CPT | Performed by: OBSTETRICS & GYNECOLOGY

## 2023-06-18 PROCEDURE — 37000009 HC ANESTHESIA EA ADD 15 MINS: Performed by: OBSTETRICS & GYNECOLOGY

## 2023-06-18 PROCEDURE — 86900 BLOOD TYPING SEROLOGIC ABO: CPT | Performed by: OBSTETRICS & GYNECOLOGY

## 2023-06-18 PROCEDURE — 25000003 PHARM REV CODE 250: Performed by: ANESTHESIOLOGY

## 2023-06-18 PROCEDURE — 12000002 HC ACUTE/MED SURGE SEMI-PRIVATE ROOM

## 2023-06-18 PROCEDURE — 81001 URINALYSIS AUTO W/SCOPE: CPT | Performed by: SPECIALIST

## 2023-06-18 PROCEDURE — 63600175 PHARM REV CODE 636 W HCPCS: Performed by: ANESTHESIOLOGY

## 2023-06-18 PROCEDURE — P9016 RBC LEUKOCYTES REDUCED: HCPCS | Performed by: SPECIALIST

## 2023-06-18 PROCEDURE — 25000003 PHARM REV CODE 250: Performed by: NURSE ANESTHETIST, CERTIFIED REGISTERED

## 2023-06-18 PROCEDURE — 85025 COMPLETE CBC W/AUTO DIFF WBC: CPT | Performed by: OBSTETRICS & GYNECOLOGY

## 2023-06-18 PROCEDURE — 86592 SYPHILIS TEST NON-TREP QUAL: CPT | Performed by: OBSTETRICS & GYNECOLOGY

## 2023-06-18 PROCEDURE — 63600175 PHARM REV CODE 636 W HCPCS: Performed by: OBSTETRICS & GYNECOLOGY

## 2023-06-18 RX ORDER — CEFAZOLIN SODIUM 2 G/50ML
2 SOLUTION INTRAVENOUS
Status: DISCONTINUED | OUTPATIENT
Start: 2023-06-18 | End: 2023-06-19

## 2023-06-18 RX ORDER — OXYTOCIN 10 [USP'U]/ML
10 INJECTION, SOLUTION INTRAMUSCULAR; INTRAVENOUS ONCE AS NEEDED
Status: DISCONTINUED | OUTPATIENT
Start: 2023-06-18 | End: 2023-06-20 | Stop reason: HOSPADM

## 2023-06-18 RX ORDER — IBUPROFEN 400 MG/1
800 TABLET ORAL EVERY 6 HOURS
Status: DISCONTINUED | OUTPATIENT
Start: 2023-06-19 | End: 2023-06-19

## 2023-06-18 RX ORDER — CARBOPROST TROMETHAMINE 250 UG/ML
250 INJECTION, SOLUTION INTRAMUSCULAR
Status: DISCONTINUED | OUTPATIENT
Start: 2023-06-18 | End: 2023-06-20 | Stop reason: HOSPADM

## 2023-06-18 RX ORDER — FENTANYL CITRATE 50 UG/ML
INJECTION, SOLUTION INTRAMUSCULAR; INTRAVENOUS
Status: DISCONTINUED | OUTPATIENT
Start: 2023-06-18 | End: 2023-06-18

## 2023-06-18 RX ORDER — IBUPROFEN 400 MG/1
800 TABLET ORAL EVERY 6 HOURS
Status: DISCONTINUED | OUTPATIENT
Start: 2023-06-19 | End: 2023-06-18

## 2023-06-18 RX ORDER — METHYLERGONOVINE MALEATE 0.2 MG/ML
200 INJECTION INTRAVENOUS
Status: DISCONTINUED | OUTPATIENT
Start: 2023-06-18 | End: 2023-06-20 | Stop reason: HOSPADM

## 2023-06-18 RX ORDER — PROCHLORPERAZINE EDISYLATE 5 MG/ML
5 INJECTION INTRAMUSCULAR; INTRAVENOUS EVERY 6 HOURS PRN
Status: DISCONTINUED | OUTPATIENT
Start: 2023-06-18 | End: 2023-06-18

## 2023-06-18 RX ORDER — BUPIVACAINE HYDROCHLORIDE 5 MG/ML
24 INJECTION, SOLUTION EPIDURAL; INTRACAUDAL ONCE
Status: COMPLETED | OUTPATIENT
Start: 2023-06-18 | End: 2023-06-18

## 2023-06-18 RX ORDER — SIMETHICONE 80 MG
1 TABLET,CHEWABLE ORAL EVERY 6 HOURS PRN
Status: DISCONTINUED | OUTPATIENT
Start: 2023-06-18 | End: 2023-06-20 | Stop reason: HOSPADM

## 2023-06-18 RX ORDER — DIPHENHYDRAMINE HYDROCHLORIDE 50 MG/ML
INJECTION INTRAMUSCULAR; INTRAVENOUS
Status: DISCONTINUED | OUTPATIENT
Start: 2023-06-18 | End: 2023-06-18

## 2023-06-18 RX ORDER — MORPHINE SULFATE 0.5 MG/ML
INJECTION, SOLUTION EPIDURAL; INTRATHECAL; INTRAVENOUS
Status: DISCONTINUED | OUTPATIENT
Start: 2023-06-18 | End: 2023-06-18

## 2023-06-18 RX ORDER — MEPERIDINE HYDROCHLORIDE 25 MG/ML
12.5 INJECTION INTRAMUSCULAR; INTRAVENOUS; SUBCUTANEOUS ONCE AS NEEDED
Status: ACTIVE | OUTPATIENT
Start: 2023-06-18 | End: 2023-06-19

## 2023-06-18 RX ORDER — CEFAZOLIN SODIUM 2 G/50ML
2 SOLUTION INTRAVENOUS ONCE
Status: DISCONTINUED | OUTPATIENT
Start: 2023-06-18 | End: 2023-06-19

## 2023-06-18 RX ORDER — SODIUM CHLORIDE, SODIUM LACTATE, POTASSIUM CHLORIDE, CALCIUM CHLORIDE 600; 310; 30; 20 MG/100ML; MG/100ML; MG/100ML; MG/100ML
INJECTION, SOLUTION INTRAVENOUS CONTINUOUS
Status: CANCELLED | OUTPATIENT
Start: 2023-06-18

## 2023-06-18 RX ORDER — SODIUM CHLORIDE, SODIUM LACTATE, POTASSIUM CHLORIDE, CALCIUM CHLORIDE 600; 310; 30; 20 MG/100ML; MG/100ML; MG/100ML; MG/100ML
INJECTION, SOLUTION INTRAVENOUS CONTINUOUS
Status: DISCONTINUED | OUTPATIENT
Start: 2023-06-18 | End: 2023-06-18

## 2023-06-18 RX ORDER — OXYTOCIN/RINGER'S LACTATE 30/500 ML
334 PLASTIC BAG, INJECTION (ML) INTRAVENOUS ONCE AS NEEDED
Status: DISCONTINUED | OUTPATIENT
Start: 2023-06-18 | End: 2023-06-20 | Stop reason: HOSPADM

## 2023-06-18 RX ORDER — OXYTOCIN/RINGER'S LACTATE 30/500 ML
334 PLASTIC BAG, INJECTION (ML) INTRAVENOUS ONCE
Status: DISCONTINUED | OUTPATIENT
Start: 2023-06-18 | End: 2023-06-19

## 2023-06-18 RX ORDER — TRANEXAMIC ACID 10 MG/ML
1000 INJECTION, SOLUTION INTRAVENOUS ONCE AS NEEDED
Status: DISCONTINUED | OUTPATIENT
Start: 2023-06-18 | End: 2023-06-20 | Stop reason: HOSPADM

## 2023-06-18 RX ORDER — CARBOPROST TROMETHAMINE 250 UG/ML
250 INJECTION, SOLUTION INTRAMUSCULAR ONCE
Status: DISCONTINUED | OUTPATIENT
Start: 2023-06-18 | End: 2023-06-19

## 2023-06-18 RX ORDER — DIPHENHYDRAMINE HYDROCHLORIDE 50 MG/ML
25 INJECTION INTRAMUSCULAR; INTRAVENOUS EVERY 4 HOURS PRN
Status: DISCONTINUED | OUTPATIENT
Start: 2023-06-18 | End: 2023-06-20 | Stop reason: HOSPADM

## 2023-06-18 RX ORDER — HYDROCODONE BITARTRATE AND ACETAMINOPHEN 500; 5 MG/1; MG/1
TABLET ORAL
Status: DISCONTINUED | OUTPATIENT
Start: 2023-06-18 | End: 2023-06-20

## 2023-06-18 RX ORDER — CEFAZOLIN SODIUM 1 G/3ML
INJECTION, POWDER, FOR SOLUTION INTRAMUSCULAR; INTRAVENOUS
Status: DISCONTINUED | OUTPATIENT
Start: 2023-06-18 | End: 2023-06-18

## 2023-06-18 RX ORDER — PROCHLORPERAZINE EDISYLATE 5 MG/ML
5 INJECTION INTRAMUSCULAR; INTRAVENOUS EVERY 6 HOURS PRN
Status: DISCONTINUED | OUTPATIENT
Start: 2023-06-18 | End: 2023-06-20 | Stop reason: HOSPADM

## 2023-06-18 RX ORDER — MISOPROSTOL 200 UG/1
800 TABLET ORAL ONCE AS NEEDED
Status: DISCONTINUED | OUTPATIENT
Start: 2023-06-18 | End: 2023-06-20 | Stop reason: HOSPADM

## 2023-06-18 RX ORDER — OXYCODONE AND ACETAMINOPHEN 5; 325 MG/1; MG/1
1 TABLET ORAL EVERY 6 HOURS PRN
Status: DISCONTINUED | OUTPATIENT
Start: 2023-06-18 | End: 2023-06-20 | Stop reason: HOSPADM

## 2023-06-18 RX ORDER — NALBUPHINE HYDROCHLORIDE 10 MG/ML
5 INJECTION, SOLUTION INTRAMUSCULAR; INTRAVENOUS; SUBCUTANEOUS
Status: DISCONTINUED | OUTPATIENT
Start: 2023-06-18 | End: 2023-06-20 | Stop reason: HOSPADM

## 2023-06-18 RX ORDER — MISOPROSTOL 200 UG/1
800 TABLET ORAL ONCE
Status: DISCONTINUED | OUTPATIENT
Start: 2023-06-18 | End: 2023-06-19

## 2023-06-18 RX ORDER — OXYCODONE AND ACETAMINOPHEN 10; 325 MG/1; MG/1
1 TABLET ORAL EVERY 6 HOURS PRN
Status: DISCONTINUED | OUTPATIENT
Start: 2023-06-18 | End: 2023-06-20 | Stop reason: HOSPADM

## 2023-06-18 RX ORDER — HYDROMORPHONE HYDROCHLORIDE 1 MG/ML
2 INJECTION, SOLUTION INTRAMUSCULAR; INTRAVENOUS; SUBCUTANEOUS
Status: DISCONTINUED | OUTPATIENT
Start: 2023-06-18 | End: 2023-06-20 | Stop reason: HOSPADM

## 2023-06-18 RX ORDER — METHYLERGONOVINE MALEATE 0.2 MG/ML
200 INJECTION INTRAVENOUS ONCE
Status: DISCONTINUED | OUTPATIENT
Start: 2023-06-18 | End: 2023-06-19

## 2023-06-18 RX ORDER — ONDANSETRON 2 MG/ML
4 INJECTION INTRAMUSCULAR; INTRAVENOUS EVERY 6 HOURS PRN
Status: DISCONTINUED | OUTPATIENT
Start: 2023-06-18 | End: 2023-06-20 | Stop reason: HOSPADM

## 2023-06-18 RX ORDER — OXYCODONE HYDROCHLORIDE 5 MG/1
10 TABLET ORAL EVERY 4 HOURS PRN
Status: DISCONTINUED | OUTPATIENT
Start: 2023-06-18 | End: 2023-06-20 | Stop reason: HOSPADM

## 2023-06-18 RX ORDER — PHENYLEPHRINE HYDROCHLORIDE 10 MG/ML
INJECTION INTRAVENOUS
Status: DISCONTINUED | OUTPATIENT
Start: 2023-06-18 | End: 2023-06-18

## 2023-06-18 RX ORDER — SODIUM CHLORIDE, SODIUM LACTATE, POTASSIUM CHLORIDE, CALCIUM CHLORIDE 600; 310; 30; 20 MG/100ML; MG/100ML; MG/100ML; MG/100ML
INJECTION, SOLUTION INTRAVENOUS CONTINUOUS
Status: DISCONTINUED | OUTPATIENT
Start: 2023-06-18 | End: 2023-06-20 | Stop reason: HOSPADM

## 2023-06-18 RX ORDER — DEXAMETHASONE SODIUM PHOSPHATE 4 MG/ML
4 INJECTION, SOLUTION INTRA-ARTICULAR; INTRALESIONAL; INTRAMUSCULAR; INTRAVENOUS; SOFT TISSUE EVERY 12 HOURS PRN
Status: DISCONTINUED | OUTPATIENT
Start: 2023-06-18 | End: 2023-06-20 | Stop reason: HOSPADM

## 2023-06-18 RX ORDER — BUPIVACAINE HYDROCHLORIDE 7.5 MG/ML
INJECTION, SOLUTION EPIDURAL; RETROBULBAR
Status: COMPLETED | OUTPATIENT
Start: 2023-06-18 | End: 2023-06-18

## 2023-06-18 RX ORDER — OXYTOCIN-SODIUM CHLORIDE 0.9% IV SOLN 30 UNIT/500ML 30-0.9/5 UT/ML-%
SOLUTION INTRAVENOUS
Status: DISCONTINUED | OUTPATIENT
Start: 2023-06-18 | End: 2023-06-18

## 2023-06-18 RX ORDER — FAMOTIDINE 10 MG/ML
20 INJECTION INTRAVENOUS 2 TIMES DAILY PRN
Status: DISCONTINUED | OUTPATIENT
Start: 2023-06-18 | End: 2023-06-20 | Stop reason: HOSPADM

## 2023-06-18 RX ORDER — ONDANSETRON 2 MG/ML
INJECTION INTRAMUSCULAR; INTRAVENOUS
Status: DISCONTINUED | OUTPATIENT
Start: 2023-06-18 | End: 2023-06-18

## 2023-06-18 RX ORDER — AMOXICILLIN 250 MG
1 CAPSULE ORAL NIGHTLY PRN
Status: DISCONTINUED | OUTPATIENT
Start: 2023-06-18 | End: 2023-06-20 | Stop reason: HOSPADM

## 2023-06-18 RX ORDER — OXYTOCIN/RINGER'S LACTATE 30/500 ML
95 PLASTIC BAG, INJECTION (ML) INTRAVENOUS ONCE
Status: DISCONTINUED | OUTPATIENT
Start: 2023-06-18 | End: 2023-06-19

## 2023-06-18 RX ORDER — DOCUSATE SODIUM 100 MG/1
200 CAPSULE, LIQUID FILLED ORAL 2 TIMES DAILY
Status: DISCONTINUED | OUTPATIENT
Start: 2023-06-18 | End: 2023-06-20 | Stop reason: HOSPADM

## 2023-06-18 RX ADMIN — Medication 30 UNITS: at 07:06

## 2023-06-18 RX ADMIN — NALBUPHINE HYDROCHLORIDE 5 MG: 10 INJECTION, SOLUTION INTRAMUSCULAR; INTRAVENOUS; SUBCUTANEOUS at 10:06

## 2023-06-18 RX ADMIN — SODIUM CHLORIDE, SODIUM LACTATE, POTASSIUM CHLORIDE, AND CALCIUM CHLORIDE: .6; .31; .03; .02 INJECTION, SOLUTION INTRAVENOUS at 06:06

## 2023-06-18 RX ADMIN — ONDANSETRON 4 MG: 2 INJECTION INTRAMUSCULAR; INTRAVENOUS at 06:06

## 2023-06-18 RX ADMIN — DOCUSATE SODIUM 200 MG: 100 CAPSULE, LIQUID FILLED ORAL at 11:06

## 2023-06-18 RX ADMIN — FENTANYL CITRATE 10 MCG: 50 INJECTION, SOLUTION INTRAMUSCULAR; INTRAVENOUS at 07:06

## 2023-06-18 RX ADMIN — BUPIVACAINE HYDROCHLORIDE 1.4 ML: 7.5 INJECTION, SOLUTION EPIDURAL; RETROBULBAR at 07:06

## 2023-06-18 RX ADMIN — AZITHROMYCIN MONOHYDRATE 500 MG: 500 INJECTION, POWDER, LYOPHILIZED, FOR SOLUTION INTRAVENOUS at 06:06

## 2023-06-18 RX ADMIN — MORPHINE SULFATE 2.5 MG: 0.5 INJECTION, SOLUTION EPIDURAL; INTRATHECAL; INTRAVENOUS at 07:06

## 2023-06-18 RX ADMIN — OXYCODONE HYDROCHLORIDE 10 MG: 5 TABLET ORAL at 09:06

## 2023-06-18 RX ADMIN — SODIUM CHLORIDE: 900 INJECTION INTRAVENOUS at 06:06

## 2023-06-18 RX ADMIN — SODIUM CHLORIDE, SODIUM LACTATE, POTASSIUM CHLORIDE, AND CALCIUM CHLORIDE: .6; .31; .03; .02 INJECTION, SOLUTION INTRAVENOUS at 11:06

## 2023-06-18 RX ADMIN — FENTANYL CITRATE 90 MCG: 50 INJECTION, SOLUTION INTRAMUSCULAR; INTRAVENOUS at 07:06

## 2023-06-18 RX ADMIN — PHENYLEPHRINE HYDROCHLORIDE 100 MCG: 10 INJECTION INTRAVENOUS at 07:06

## 2023-06-18 RX ADMIN — DIPHENHYDRAMINE HYDROCHLORIDE 12.5 MG: 50 INJECTION, SOLUTION INTRAMUSCULAR; INTRAVENOUS at 07:06

## 2023-06-18 RX ADMIN — CEFAZOLIN 2 G: 330 INJECTION, POWDER, FOR SOLUTION INTRAMUSCULAR; INTRAVENOUS at 06:06

## 2023-06-18 NOTE — ANESTHESIA PREPROCEDURE EVALUATION
2023  Agnes Matos is a 28 y.o., female.    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    Left ovarian cyst    11 weeks gestation of pregnancy    Acute abdomen    12 weeks gestation of pregnancy    Crohn's disease of intestine with complication    Peritonitis    Kidney stone complicating pregnancy, second trimester       Past Surgical History:   Procedure Laterality Date     SECTION  17 &     Two births    COLON SURGERY  21    A small part along with tbe small bowel    COLONOSCOPY N/A 2022    Procedure: COLONOSCOPY;  Surgeon: Gregorio Bourne MD;  Location: [facility];  Service: Gastroenterology;  Laterality: N/A;    CYSTOSCOPY W/ URETERAL STENT PLACEMENT Right 2/10/2023    Procedure: CYSTOSCOPY, WITH URETERAL STENT INSERTION;  Surgeon: Marry Erickson MD;  Location: [facility] OR;  Service: Urology;  Laterality: Right;    DIAGNOSTIC LAPAROSCOPY Left 2022    Procedure: LAPAROSCOPY, DIAGNOSTIC- SAMPLING OF PERITONEAL FLUID;  Surgeon: Yajaira Siegel MD;  Location: [facility] OR;  Service: OB/GYN;  Laterality: Left;    presacral neurectomy          SMALL INTESTINE SURGERY  2021    partial with partial large bowel    SURGICAL REMOVAL OF ENDOMETRIOSIS      TONSILLECTOMY  2006    TUBAL LIGATION  10-26-19    After c-secrtion    URETEROSCOPIC REMOVAL OF URETERIC CALCULUS Right 2/10/2023    Procedure: REMOVAL, CALCULUS, URETER, URETEROSCOPIC;  Surgeon: Marry Erickson MD;  Location: [facility];  Service: Urology;  Laterality: Right;        Tobacco Use:  The patient  reports that she has never smoked. She has never used smokeless  tobacco.     No results found for this or any previous visit.          Lab Results   Component Value Date    WBC 11.04 06/18/2023    HGB 8.4 (L) 06/18/2023    HCT 27.5 (L) 06/18/2023    MCV 68 (L) 06/18/2023     06/18/2023     BMP  Lab Results   Component Value Date     (L) 02/08/2023    K 3.7 02/08/2023     02/08/2023    CO2 23 02/08/2023    BUN 8 02/08/2023    CREATININE 0.5 02/08/2023    CALCIUM 9.2 02/08/2023    ANIONGAP 10 02/08/2023     02/08/2023     (H) 02/02/2023    GLU 83 01/30/2023       No results found for this or any previous visit.            Pre-op Assessment    I have reviewed the Patient Summary Reports.     I have reviewed the Nursing Notes. I have reviewed the NPO Status.   I have reviewed the Medications.     Review of Systems  Anesthesia Hx:  No problems with previous Anesthesia  Denies Family Hx of Anesthesia complications.   Denies Personal Hx of Anesthesia complications.   Social:  Non-Smoker    Hematology/Oncology:         -- Anemia:   EENT/Dental:  EENT/Dental Normal TMJ  Jaw Problems: (TMJ)   Cardiovascular:  Cardiovascular Normal     Pulmonary:  Pulmonary Normal    Renal/:   Chronic renal disease: kidney stone. renal calculi    Hepatic/GI:   GERD Liver Disease, (Cholestasis during pregnancy) Crohn's disease, stable. Hepatic/GI Symptoms: (Crohn's colitis)    Musculoskeletal:  Musculoskeletal Normal    OB/GYN/PEDS:  History of endometriosis.  Patient has a dermoid cyst.   Neurological:  Neurology Normal    Endocrine:  Endocrine Normal    Psych:   anxiety depression          Physical Exam  General: Well nourished    Airway:  Mallampati: III   Mouth Opening: Normal  TM Distance: Normal  Tongue: Normal  Neck ROM: Normal ROM    Dental:  Intact    Chest/Lungs:  Clear to auscultation, Normal Respiratory Rate    Heart:  Rate: Normal  Rhythm: Regular Rhythm        Anesthesia Plan  Type of Anesthesia, risks & benefits discussed:    Anesthesia Type: CSE  Intra-op  Monitoring Plan: Standard ASA Monitors  Post Op Pain Control Plan: IV/PO Opioids PRN and multimodal analgesia  Informed Consent: Informed consent signed with the Patient and all parties understand the risks and agree with anesthesia plan.  All questions answered.   ASA Score: 2  Anesthesia Plan Notes: CSE  Multimodal analgesia with ofirmev 1000 mg and local by surgeon.  Duramorph 2.5 mg epidurally after delivery.  Will transfuse one unit of blood per Dr. Mcdonough.    Ready For Surgery From Anesthesia Perspective.     .

## 2023-06-18 NOTE — DISCHARGE INSTRUCTIONS
Pelvic rest for 6 weeks (no sex, tampons, douching, nothing in the vagina)    You can experience vaginal bleeding on and off for up to 6 weeks, it will gradually get lighter and the color will change from bright red to a brownish discharge towards the end.    Activity:  NO strenuous activities or exercising for 6 weeks.  Do not /lift anything over 15 pounds and no heavy housework or cleaning for 6 weeks.  Limit stair climbing to twice a day during the first 2 weeks.   NO driving for 4 weeks.  You may take short car trips but do not drive.    You may shower ONLY for the first 2 weeks, after 2 weeks you can soak in a bathtub.  Use a mild soap, no heavy perfumes or fragrances to avoid irritation.     Walking frequently following a  delivery promotes healing and decreases pain associated with gas.   If constipation develops:  You may take Colace (stool softener), Milk of Magnesia, Dulcolax or Miralax.  All of these medications are sold over the counter.      Incision Care:   Clean your incision with mild soap & warm water only- do not scrub- let warm water run over it, then pat dry.      Pain Relief:  You may take Motrin for mild pain & uterine cramping.      Emotional Changes:  You may experience baby blues after delivery.  You may feel let down, anxious and cry easily.  This is normal.  These feelings can begin 2-3 days after delivery and usually disappear in about a week or two.  Prolonged sadness may indicate postpartum depression.      Call your doctor for any of the following:  Difficulty breathing, problems with any of your medications, inability to eat.    Foul smelling vaginal discharge.  If you notice pus-like drainage from your incision, if your incision or the area around it becomes hot or swollen, or if you notice a foul smelling odor.  Temperature above 100.4.    Heavy vaginal bleeding.  All women bleed different after delivery and each delivery is different.  Heavy bleeding consists of  saturating a kanu pad in a 1 hour time period.  Passing clots are normal, if you pass a blood clot larger than the size of a golf ball call your doctor's office.   If you experience pain in your legs/calves, if one leg increases in size and becomes swollen or becomes hot to touch or discolored.   Crying or periods of sadness beyond 2 weeks.      If you are breast feeding:  Wash your breasts with mild soap and warm water.  You should wear a supportive bra.  You should continue to take a prenatal vitamin for 6 weeks or until breastfeeding is discontinued.  If nipples are sore, apply a few drops of breast milk after nursing and let air dry or you can use Lanolin cream.   If breasts are engorged, apply warmth and express milk.    If you are not breastfeeding:  Wear a tight bra and do not stimulate your breasts.  Avoid handling your breasts and do not express milk.  You may apply ice packs or cabbage leaves to relieve discomfort from engorgement.  If your breasts become warm to touch, reddened or lumps develop call your doctor. Keep your scheduled appointment with your provider.    Call your Doctor if you have any of the following:  Temperature above 100 degrees  Nausea, vomiting and/or diarrhea  Severe headache, dizziness, or blurred vision  Notable increase in swelling of hands or feet  Notable swelling of face and lips  Difficulty, pain or burning with urination  Foul smelling vaginal discharge  Decreased fetal movement    Come to the hospital if you have any of the following symptoms:  Your water breaks  More than 4-6 contractions in 1 hour for 2 or more hours  Vaginal bleeding like a period    After 28 weeks, you should feel 10 distinct fetal movements within a 2 hour period.    It is recommended that you drink 1/2 a gallon of water each day.  Tea, Soda and Juice are  in addition to this.

## 2023-06-18 NOTE — PLAN OF CARE
Problem: Adult Inpatient Plan of Care  Goal: Plan of Care Review  Outcome: Ongoing, Progressing  Goal: Patient-Specific Goal (Individualized)  Outcome: Ongoing, Progressing  Goal: Absence of Hospital-Acquired Illness or Injury  Outcome: Ongoing, Progressing  Goal: Optimal Comfort and Wellbeing  Outcome: Ongoing, Progressing  Goal: Readiness for Transition of Care  Outcome: Ongoing, Progressing     Problem:  Fall Injury Risk  Goal: Absence of Fall, Infant Drop and Related Injury  Outcome: Ongoing, Progressing     Problem: Infection  Goal: Absence of Infection Signs and Symptoms  Outcome: Ongoing, Progressing     Problem: Bleeding ( Delivery)  Goal: Bleeding is Controlled  Outcome: Ongoing, Progressing     Problem: Change in Fetal Wellbeing ( Delivery)  Goal: Stable Fetal Wellbeing  Outcome: Ongoing, Progressing     Problem: Infection ( Delivery)  Goal: Absence of Infection Signs and Symptoms  Outcome: Ongoing, Progressing     Problem: Respiratory Compromise ( Delivery)  Goal: Effective Oxygenation and Ventilation  Outcome: Ongoing, Progressing

## 2023-06-18 NOTE — NURSING
"Critical access hospital   Labor and Delivery Triage    SUBJECTIVE:     Patient Info:  Agnes Matos         28 y.o.    female    1995     Chief Complaint/Reason for Admission: Contractions since 2100 on 23 that have gotten more intense and closer together.     Triage Assessment:  Pt presents to [facility] L&D unit with c/o contractions. EFM applied. Abdomen soft, nontender. +FM per pt. -vaginal bleeding.  POC discussed, V/U.       OB History:   OB History          1    Para        Term                AB        Living             SAB        IAB        Ectopic        Multiple        Live Births                       Estimated Date of Delivery: 23     Gestational Age:  38w1d    Allergies:  Review of patient's allergies indicates:   Allergen Reactions    Methotrexate Swelling     Hands face      Stelara [ustekinumab] Hives    Vedolizumab Hives    Adalimumab Rash, Itching and Hives     joint ache      Renflexis [infliximab-abda] Rash     Other reaction(s): Rash or Itch, Other: joint ache, Rash or Itch, Other: joint ache         OBJECTIVE:     Recent Vitals:  /64   Pulse 96   Temp 98.7 °F (37.1 °C) (Oral)   Resp 18   Ht 5' 3" (1.6 m)   Wt 95.3 kg (210 lb)   LMP  (LMP Unknown)     Exam:    dilated to 4 cm and bulging membranes      Lab Results:  No results found for this or any previous visit (from the past 36 hour(s)).  Lab Results   Component Value Date    [facility] AB POS 2023          Diagnostic Studies:    Baseline: 125 bpm        COMMUNICATION WITH PROVIDER:     [id number]: Dr. Mcdonough notified of pt arrival and status. Orders recvd to admit pt and prepare for repeat .   "

## 2023-06-19 LAB
BASOPHILS # BLD AUTO: 0.02 K/UL (ref 0–0.2)
BASOPHILS NFR BLD: 0.2 % (ref 0–1.9)
DIFFERENTIAL METHOD: ABNORMAL
EOSINOPHIL # BLD AUTO: 0.1 K/UL (ref 0–0.5)
EOSINOPHIL NFR BLD: 0.5 % (ref 0–8)
ERYTHROCYTE [DISTWIDTH] IN BLOOD BY AUTOMATED COUNT: 17.2 % (ref 11.5–14.5)
HCT VFR BLD AUTO: 31.8 % (ref 37–48.5)
HGB BLD-MCNC: 9.7 G/DL (ref 12–16)
IMM GRANULOCYTES # BLD AUTO: 0.08 K/UL (ref 0–0.04)
IMM GRANULOCYTES NFR BLD AUTO: 0.6 % (ref 0–0.5)
LYMPHOCYTES # BLD AUTO: 1.8 K/UL (ref 1–4.8)
LYMPHOCYTES NFR BLD: 13.5 % (ref 18–48)
MCH RBC QN AUTO: 21.5 PG (ref 27–31)
MCHC RBC AUTO-ENTMCNC: 30.5 G/DL (ref 32–36)
MCV RBC AUTO: 71 FL (ref 82–98)
MONOCYTES # BLD AUTO: 0.7 K/UL (ref 0.3–1)
MONOCYTES NFR BLD: 5.3 % (ref 4–15)
NEUTROPHILS # BLD AUTO: 10.3 K/UL (ref 1.8–7.7)
NEUTROPHILS NFR BLD: 79.9 % (ref 38–73)
NRBC BLD-RTO: 0 /100 WBC
PLATELET # BLD AUTO: 393 K/UL (ref 150–450)
PMV BLD AUTO: 9.8 FL (ref 9.2–12.9)
RBC # BLD AUTO: 4.51 M/UL (ref 4–5.4)
RPR SER QL: NORMAL
WBC # BLD AUTO: 12.94 K/UL (ref 3.9–12.7)

## 2023-06-19 PROCEDURE — 25000003 PHARM REV CODE 250: Performed by: SPECIALIST

## 2023-06-19 PROCEDURE — 25000003 PHARM REV CODE 250: Performed by: OBSTETRICS & GYNECOLOGY

## 2023-06-19 PROCEDURE — 12000002 HC ACUTE/MED SURGE SEMI-PRIVATE ROOM

## 2023-06-19 PROCEDURE — 85025 COMPLETE CBC W/AUTO DIFF WBC: CPT | Performed by: SPECIALIST

## 2023-06-19 PROCEDURE — 36415 COLL VENOUS BLD VENIPUNCTURE: CPT | Performed by: SPECIALIST

## 2023-06-19 PROCEDURE — 63600175 PHARM REV CODE 636 W HCPCS: Performed by: OBSTETRICS & GYNECOLOGY

## 2023-06-19 RX ORDER — LOPERAMIDE HYDROCHLORIDE 2 MG/1
2 CAPSULE ORAL 4 TIMES DAILY PRN
Status: DISCONTINUED | OUTPATIENT
Start: 2023-06-19 | End: 2023-06-20 | Stop reason: HOSPADM

## 2023-06-19 RX ORDER — IBUPROFEN 400 MG/1
800 TABLET ORAL EVERY 6 HOURS PRN
Status: DISCONTINUED | OUTPATIENT
Start: 2023-06-19 | End: 2023-06-20 | Stop reason: HOSPADM

## 2023-06-19 RX ADMIN — OXYCODONE AND ACETAMINOPHEN 1 TABLET: 325; 5 TABLET ORAL at 05:06

## 2023-06-19 RX ADMIN — LOPERAMIDE HYDROCHLORIDE 2 MG: 2 CAPSULE ORAL at 11:06

## 2023-06-19 RX ADMIN — NALBUPHINE HYDROCHLORIDE 5 MG: 10 INJECTION, SOLUTION INTRAMUSCULAR; INTRAVENOUS; SUBCUTANEOUS at 02:06

## 2023-06-19 RX ADMIN — OXYCODONE AND ACETAMINOPHEN 1 TABLET: 325; 5 TABLET ORAL at 11:06

## 2023-06-19 NOTE — NURSING
Nurses Note -- 4 Eyes      6/18/2023   6:40 PM      Skin assessed during: Admit      [x] No Altered Skin Integrity Present    []Prevention Measures Documented      [] Yes- Altered Skin Integrity Present or Discovered   [] LDA Added if Not in Epic (Describe Wound)   [] New Altered Skin Integrity was Present on Admit and Documented in LDA   [] Wound Image Taken    Wound Care Consulted? No    Attending Nurse:  Annabel Wiley RN     Second RN/Staff Member:  Yeny Batista RN

## 2023-06-19 NOTE — L&D DELIVERY NOTE
[facility]   Section   Operative Note    SUMMARY     Date of Procedure: 2023     Procedure: Procedure(s) (LRB):   SECTION (N/A)    Surgeon(s) and Role:     * Lisha Mcdonough MD - Primary    Assisting Surgeon: None    Pre-Operative Diagnosis: Pregnancy, previous C/S x2 , left dermoid, sever anemia    Post-Operative Diagnosis: Post-Op Diagnosis Codes:     * Pregnancy [Z34.90]     * S/P repeat low transverse  [Z98.891]   Double footling breech presentation, 12 x 7ccm left ovarian dermoid/mass adesed to the left uterus side wall and extensive bowel posteriorly, transfusion of 1 u PRBC intraop    Anesthesia: Spinal/epidural    Technical Procedures Used: repeat LTCS, double footling breech extraction, ANNA           Description of the Findings of the Procedure: left ovarian mass with extensive bowel adhesions posteriorly and adhesions to left uterine side wall.    Significant Surgical Tasks Conducted by the Assistant(s), if Applicable: none    Complications: No    Blood Loss: EBL 300cc     QBL  209cc    The risks, benefits, indication, and alternatives of the procedure were discussed with the patient and informed consent was obtained.  She was taken to the operating room where spinal anesthesia was found to be adequate. She was prepped and draped in the usual sterile fashion.  A Grant catheter was inserted. SCD hose were placed and she received Ancef 2gm IV, Zithromax 500mg IV as antibiotic prophylaxis before any incision was made.  Vaginal betadine prep was done.  A time out was taken before the procedure was started. She was given 1u PRBC during the case for her severe anemia as preventative.     A Pfannenstiel skin incision was made with the knife, and this was carried down to the fascia. The fascial incision was excised transversely, and then both superiorly and inferiorly off of the muscle. The muscle was  in the midline and the peritoneum was identified and  entered bluntly. The peritoneal incision was extended superiorly and inferiorly with good visualization of the bladder. The bladder blade was placed and the bladder flap was created under both sharp and blunt dissection.       The uterine incision was started with the knife, and this incision was extended laterally and bluntly also for a low transverse uterine incision. The membranes protruded through the incision, were ruptured upon entry and had very mild meconium. The bladder blade was removed and the infant was in the double footling breech presentation.  Both feet were grasped, and with fundal pressure, the baby was pushed out easily.  It is a viable male infant.  Nares and mouth were suctioned by the [facility],  The cord was clamped and cut, and the infant was handed off to the  nurse. The uterus was massaged and the placenta was completely removed, then the uterus was exteriorized and wiped clean.The uterine incision was repaired with a single layer of 0 Maxon in a running fashion. The uterine incision needed a couple of 2.3 chromic for minor bleeding.     The right ovary was normal, and that tube had filmy adhesions over it from her previous tubal.    The left ovary was very long for a length of 12cm and width of 7cm.  It is completely adherent the left uterine side wall, and extensive bowel adhered posteriorly. I could only take a few filmy adhesions down, before stopping.  The nurses tried to contact the surgeon on call for a second opinion and/or removal of the bowel, and they could not locate him intra-operatively. I made the decision to close the patient, and told her a bowel prep was likely needed when she goes back for surgery.  I placed fibrillar between the uterus and posterior ovary where I had taken down a few adhesions.     The uterus was returned into the abdomen, and the pelvis was irrigated copiously with warm normal saline. The uterine incision and lower uterine segment were again checked  for hemostasis, and Hannah was placed over the area for added hemostasis.  All instruments and lap were removed from the abdomen and pelvis, and counts are correct.     The rectus muscles are hemostatic.  The fascia was closed with 0 Maxon in a running fashion. The subcuticular fat was irrigated with normal saline and hemostasis was achieved using the Bovie. Exparel was injected across the surgical incision, then gopi's fascia was reapproximated using 2-0 Vicryl in a running fashion. The skin was closed using 4-0 Monocryl in a subcuticular fashion, Dermaflex was placed over the incision, then a Mepilex dressing was placed over the incision. The patient tolerated procedure well. Sponge lap needle and instrument counts were correct ×2. She was taken recovery in stable condition. The gallagher continues to drain clear urine.            Specimens:   Specimen (24h ago, onward)      None            Condition: Good    Disposition: PACU - hemodynamically stable.    Attestation: Good         Delivery Information for Mohit Matos    Birth information:  YOB: 2023   Time of birth: 7:17 PM   Sex: male   Head Delivery Date/Time:     Delivery type:    Gestational Age: 38w1d        Delivery Providers    Delivering clinician:            Measurements    Weight:   Length:          Apgars    Living status:   Apgars:  1 min.:  5 min.:  10 min.:  15 min.:  20 min.:    Skin color:         Heart rate:         Reflex irritability:         Muscle tone:         Respiratory effort:         Total:                                  Interventions/Resuscitation           Cord    No data filed       Placenta    Placenta delivery date/time:   Placenta removal:            Labor Events:       labor:       Labor Onset Date/Time:         Dilation Complete Date/Time:         Start Pushing Date/Time:         Start Pushing Date/Time:       Rupture Date/Time:            Rupture type: Bulging         Fluid Amount:       Fluid Color:                 steroids:       Antibiotics given for GBS:       Induction:       Indications for induction:        Augmentation:       Indications for augmentation:       Labor complications:       Additional complications:          Cervical ripening:                     Delivery:      Episiotomy:       Indication for Episiotomy:       Perineal Lacerations:   Repaired:      Periurethral Laceration:   Repaired:     Labial Laceration:   Repaired:     Sulcus Laceration:   Repaired:     Vaginal Laceration:   Repaired:     Cervical Laceration:   Repaired:     Repair suture:       Repair # of packets:       Last Value - EBL - Nursing (mL):       Sum - EBL - Nursing (mL): 0     Last Value - EBL - Anesthesia (mL):      Calculated QBL (mL):       Vaginal Sweep Performed:       Surgicount Correct:         Other providers:            Details (if applicable):  Trial of Labor      Categorization:      Priority:     Indications for :     Incision Type:       Additional  information:  Forceps:    Vacuum:    Breech:    Observed anomalies    Other (Comments):

## 2023-06-19 NOTE — ANESTHESIA POSTPROCEDURE EVALUATION
Anesthesia Post Evaluation    Patient: Agnes Matos    Procedure(s) Performed: Procedure(s) (LRB):   SECTION (N/A)    Final Anesthesia Type: CSE      Patient location during evaluation: labor & delivery  Patient participation: Yes- Able to Participate  Level of consciousness: awake and alert  Post-procedure vital signs: reviewed and stable  Pain management: adequate  Airway patency: patent    PONV status at discharge: No PONV  Anesthetic complications: no      Cardiovascular status: blood pressure returned to baseline and stable  Respiratory status: unassisted and room air  Hydration status: euvolemic  Follow-up needed   Comments: Patient is status post .  We will follow the patient again tomorrow for pain management.          Vitals Value Taken Time   /62 23   Temp 37 C 23   Pulse 76 23   Resp 18 23   SpO2 100 % 23   Vitals shown include unvalidated device data.      No case tracking events are documented in the log.      Pain/Sanjiv Score: No data recorded

## 2023-06-19 NOTE — TRANSFER OF CARE
"Anesthesia Transfer of Care Note    Patient: Agnes Matos    Procedure(s) Performed: Procedure(s) (LRB):   SECTION (N/A)    Patient location: PACU    Anesthesia Type: regional    Transport from OR: Transported from OR on room air with adequate spontaneous ventilation    Post pain: adequate analgesia    Post assessment: no apparent anesthetic complications    Post vital signs: stable    Level of consciousness: awake    Nausea/Vomiting: no nausea/vomiting    Complications: none    Transfer of care protocol was followed      Last vitals:   Visit Vitals  /64   Pulse 96   Temp 37.1 °C (98.7 °F) (Oral)   Resp 18   Ht 5' 3" (1.6 m)   Wt 95.3 kg (210 lb)   LMP  (LMP Unknown)   SpO2 100%   Breastfeeding No   BMI 37.20 kg/m²     "

## 2023-06-19 NOTE — ANESTHESIA PROCEDURE NOTES
CSE    Patient location during procedure: OR  Start time: 6/18/2023 7:01 PM  Timeout: 6/18/2023 7:00 PM  End time: 6/18/2023 7:08 PM      Staffing  Authorizing Provider: Jeff Bajwa MD  Performing Provider: Jeff Bajwa MD    Preanesthetic Checklist  Completed: patient identified, IV checked, risks and benefits discussed, surgical consent, monitors and equipment checked, pre-op evaluation and timeout performed  CSE  Patient position: sitting  Prep: Betadine  Patient monitoring: heart rate, cardiac monitor, continuous pulse ox and frequent blood pressure checks  Approach: midline  Spinal Needle  Needle type: pencil-tip   Needle gauge: 25 G  Needle length: 5 in  Epidural Needle  Injection technique: DEVAN air  Needle type: Tuohy   Needle gauge: 17 G  Needle length: 3.5 in  Location: L3-4  Needle localization: anatomical landmarks   Catheter  Catheter type: One Touch EMR  Catheter size: 19 G  Test dose: lidocaine 1.5% with Epi 1-to-200,000  Test dose: 3 mL  Additional Documentation: incremental injection, negative aspiration for CSF and negative aspiration for heme  Assessment  Sensory level: T5   Dermatomal levels determined by pinch or prick  Intrathecal Medications:   administered: primary anesthetic mcg of    Additional Notes  After CSF was obtained, a mixture of bupivacaine 0.75% hyperbaric x 1.4 mL. with fentanyl 10 mcg was injected.      Medications:    Medications: Intraspinal - bupivacaine (pf) (MARCAINE) injection 0.75% - Intraspinal   1.4 mL - 6/18/2023 7:08:00 PM

## 2023-06-19 NOTE — PROGRESS NOTES
The patient is postoperative day 1 following repeat  section.  She was found to be awake alert and oriented x4 without evidence or sedation, confusion or respiratory depression at this time.  The patient has been able to ambulate well without difficulty.  She reports no headache or severe back ache.  The patient has denies nausea vomiting but has experienced some pruritus.  She was informed that the pruritus should decrease and was encouraged to request the medicines for pruritus that have been made available to her.  The patient, when questioned, reports adequate pain relief at this time.  The epidural site was examined and found to be clean and dry without active bleeding, infection or hematoma formation.  The patient was instructed to notify the Department of Anesthesiology with any questions, problems or concerns.  The patient demonstrates no anesthesia complications at this time.

## 2023-06-19 NOTE — LACTATION NOTE
Reviewed basic breastfeeding instructions and encouraged to call me for next feeding to evaluate latch. Patient verbalizes understanding of all instructions with good recall.

## 2023-06-19 NOTE — PLAN OF CARE
OB Screen completed and no needs identified at this time.      23 0922   OB SCREEN   Assessment Type Discharge Planning Assessment   Source of Information health record   Received Prenatal Care Yes   Any indications/suspicions for None   Is this a teen pregnancy No   Is the baby in NICU No   Indication for adoption/Safe Haven No   Indication for DME/post-acute needs No   HIV (+) No   Any congenital  disorders No   Fetal demise/ death No

## 2023-06-19 NOTE — HOSPITAL COURSE
27yo  presents in labor 4cm at 38.1wks. Previous C/S x 2 and left dermoid.  Baby footling breech presentation. Underwent repeat LTCS.  Inspection of the left ovary, it is adhered all along the left uterine side wall for a length of 12 cm and a width of 7cm, with extensive bowel adhesed posteriorly.

## 2023-06-20 VITALS
TEMPERATURE: 99 F | SYSTOLIC BLOOD PRESSURE: 111 MMHG | HEIGHT: 63 IN | HEART RATE: 77 BPM | WEIGHT: 210 LBS | RESPIRATION RATE: 16 BRPM | DIASTOLIC BLOOD PRESSURE: 73 MMHG | BODY MASS INDEX: 37.21 KG/M2 | OXYGEN SATURATION: 100 %

## 2023-06-20 PROCEDURE — 25000003 PHARM REV CODE 250: Performed by: OBSTETRICS & GYNECOLOGY

## 2023-06-20 RX ORDER — OXYCODONE AND ACETAMINOPHEN 5; 325 MG/1; MG/1
1 TABLET ORAL EVERY 6 HOURS PRN
Qty: 20 TABLET | Refills: 0 | Status: ON HOLD | OUTPATIENT
Start: 2023-06-20 | End: 2023-08-31 | Stop reason: HOSPADM

## 2023-06-20 RX ADMIN — OXYCODONE HYDROCHLORIDE AND ACETAMINOPHEN 1 TABLET: 10; 325 TABLET ORAL at 12:06

## 2023-06-20 NOTE — PROGRESS NOTES
"      SUBJECTIVE:     Postpartum Day 1  Delivery    Agnes Matos states she feels well and has no complaints. She denies emotional concerns. Her pain is well controlled with current medications. The patient is ambulating. She is tolerating a regular diet. Flatus has been passed. Urinary output is adequate.    OBJECTIVE:     Vital Signs (Most Recent):  /73 (BP Location: Right arm, Patient Position: Sitting)   Pulse 77   Temp 99 °F (37.2 °C) (Oral)   Resp 16   Ht 5' 3" (1.6 m)   Wt 95.3 kg (210 lb)   LMP  (LMP Unknown) Comment: 19 weeks pregnant  SpO2 100%   Breastfeeding Yes   BMI 37.20 kg/m²       Vital Signs Range (Last 24H):  Reviewed    I & O (Last 24H):  Intake/Output Summary (Last 24 hours)    Intake/Output Summary (Last 24 hours) at 2023 1351  Last data filed at 2023 1824  Gross per 24 hour   Intake --   Output 1200 ml   Net -1200 ml         Physical Exam:  Gen appears in NAD  Abd soft,appropriate tenderness normal bowel sounds  Incision well approximated clean dry intact  Ext  neg homans, slight edema    Hemoglobin/Hematocrit  CBC:   Lab Results   Component Value Date/Time    WBC 12.94 (H) 2023 03:44 AM    RBC 4.51 2023 03:44 AM    HGB 9.7 (L) 2023 03:44 AM    HCT 31.8 (L) 2023 03:44 AM     2023 03:44 AM    MCV 71 (L) 2023 03:44 AM    MCH 21.5 (L) 2023 03:44 AM    MCHC 30.5 (L) 2023 03:44 AM       ABO/Rh  AB POS    Rubella      ASSESSMENT/PLAN:     Status post  section.   Patient Active Problem List   Diagnosis    Crohn's disease, unspecified, without complications    Endometriosis, unspecified    S/P small bowel resection    Gastroesophageal reflux disease without esophagitis    Anxiety disorder due to general medical condition    Left ovarian cyst    11 weeks gestation of pregnancy    Acute abdomen    12 weeks gestation of pregnancy    Crohn's disease of intestine with complication    Peritonitis    Kidney stone " complicating pregnancy, second trimester    Normal labor    Previous  delivery affecting pregnancy    Breech birth        Doing well postoperatively.     Routine advances, Continue current care.   Discussed with patient findings at time of operation from discussion with Dr. Mcdonough of the significant adhesions of the ovary to the uterus and the bowel to the ovary that it would be best that she heals from her  and gets through the pregnancy changes prior to scheduling a separate procedure for removal.  Obviously due to her significant dense adhesions she is at risk for potential organ injury injury so my feeling is to refer her to a gyn oncologist at a tertiary care center for this surgery due to the complicated nature of the findings and her significant abdominal history with multiple surgeries and her history of Crohn's disease.

## 2023-06-20 NOTE — DISCHARGE SUMMARY
[facility]  Obstetrics  Discharge Summary      Patient Name: Agnes Matos  MRN: [id number]  Admission Date: 2023  Hospital Length of Stay: 2 days  Discharge Date and Time:  2023 2:08 PM  Attending Physician: Sony Dinero MD   Discharging Provider: Sony Dinero MD   Primary Care Provider: Rose Torres MD    HPI: No notes on file        Procedure(s) (LRB):   SECTION (N/A)     Hospital Course:   27yo  presents in labor 4cm at 38.1wks. Previous C/S x 2 and left dermoid.  Baby footling breech presentation. Underwent repeat LTCS.  Inspection of the left ovary, it is adhered all along the left uterine side wall for a length of 12 cm and a width of 7cm, with extensive bowel adhesed posteriorly.        Consults (From admission, onward)        Status Ordering Provider     Inpatient consult to Anesthesiology  Once        Provider:  (Not yet assigned)    Acknowledged JUAN JOSE GUTIÉRREZ     Inpatient consult to Lactation  Once        Provider:  (Not yet assigned)    Acknowledged SONY DINERO          Final Active Diagnoses:    Diagnosis Date Noted POA    PRINCIPAL PROBLEM:  Normal labor [O80, Z37.9] 2023 Not Applicable    Previous  delivery affecting pregnancy [O34.219] 2023 Yes    Breech birth [O32.1XX0] 2023 Yes    Left ovarian cyst [N83.202] 12/15/2022 Yes    Endometriosis, unspecified [N80.9] 2022 Yes      Problems Resolved During this Admission:        Significant Diagnostic Studies: Labs:   CBC   Recent Labs   Lab 23  1743 23  0344   WBC 11.04 12.94*   HGB 8.4* 9.7*   HCT 27.5* 31.8*    393     Lab Results   Component Value Date    GROUPTRH AB POS 2023         Feeding Method: breast    Immunizations     Date Immunization Status Dose Route/Site Given by    23 MMR Incomplete 0.5 mL Subcutaneous/     23 Tdap Incomplete 0.5 mL Intramuscular/           Delivery:    Episiotomy:      Lacerations:     Repair suture:     Repair # of packets:     Blood loss (ml):       Birth information:  YOB: 2023   Time of birth: 7:16 PM   Sex: male   Delivery type: , Low Transverse   Gestational Age: 38w1d    Delivery Clinician:      Other providers:       Additional  information:  Forceps:    Vacuum:    Breech:    Observed anomalies      Living?:           APGARS  One minute Five minutes Ten minutes   Skin color:         Heart rate:         Grimace:         Muscle tone:         Breathing:         Totals: 9  9        Placenta: Delivered:       appearance    Pending Diagnostic Studies:     None          Discharged Condition: good    Disposition: Home or Self Care    Follow Up:   Follow-up Information     Sony Dinero MD Follow up in 7 day(s).    Specialties: Obstetrics, Obstetrics and Gynecology  Why: For wound re-check  Contact information:  [address] [place] [address]  [phone]                       Patient Instructions:      Diet Adult Regular     Remove dressing in 24 hours     Pelvic Rest     Notify your health care provider if you experience any of the following:  temperature >100.4     Notify your health care provider if you experience any of the following:  persistent nausea and vomiting or diarrhea     Notify your health care provider if you experience any of the following:  severe uncontrolled pain     Notify your health care provider if you experience any of the following:  redness, tenderness, or signs of infection (pain, swelling, redness, odor or green/yellow discharge around incision site)     Activity as tolerated     Medications:  Current Discharge Medication List      CONTINUE these medications which have CHANGED    Details   oxyCODONE-acetaminophen (PERCOCET) 5-325 mg per tablet Take 1 tablet by mouth every 6 (six) hours as needed for Pain.  Qty: 20 tablet, Refills: 0    Comments: Quantity prescribed more than 7 day  supply? Yes, quantity medically necessary         STOP taking these medications       prenatal 25/iron fum/folic/dha (PRENATAL-1 ORAL) Comments:   Reason for Stopping:         ondansetron (ZOFRAN) 4 MG tablet Comments:   Reason for Stopping:         tamsulosin (FLOMAX) 0.4 mg Cap Comments:   Reason for Stopping:               Sony Dinero MD  Obstetrics  [facility]

## 2023-06-22 LAB
BLD PROD TYP BPU: NORMAL
BLOOD UNIT EXPIRATION DATE: NORMAL
BLOOD UNIT TYPE CODE: 5100
BLOOD UNIT TYPE CODE: 5100
BLOOD UNIT TYPE CODE: 6200
BLOOD UNIT TYPE: NORMAL
CODING SYSTEM: NORMAL
CROSSMATCH INTERPRETATION: NORMAL
DISPENSE STATUS: NORMAL
NUM UNITS TRANS PACKED RBC: NORMAL

## 2023-07-06 ENCOUNTER — TELEPHONE (OUTPATIENT)
Dept: FAMILY MEDICINE | Facility: CLINIC | Age: 28
End: 2023-07-06

## 2023-07-06 NOTE — TELEPHONE ENCOUNTER
----- Message from Ifeoma Hernandez MA sent at 7/6/2023 12:08 PM CDT -----  Regarding: appt request  Pt calling to be seen for a referral to an oncologist beau due to an issue that was discovered during birth process.

## 2023-07-10 ENCOUNTER — PATIENT MESSAGE (OUTPATIENT)
Dept: FAMILY MEDICINE | Facility: CLINIC | Age: 28
End: 2023-07-10

## 2023-07-10 DIAGNOSIS — N83.209 CYST OF OVARY, UNSPECIFIED LATERALITY: Primary | ICD-10-CM

## 2023-07-17 ENCOUNTER — TELEPHONE (OUTPATIENT)
Dept: FAMILY MEDICINE | Facility: CLINIC | Age: 28
End: 2023-07-17

## 2023-07-17 DIAGNOSIS — N80.9 ENDOMETRIOSIS, UNSPECIFIED: ICD-10-CM

## 2023-07-17 DIAGNOSIS — K50.80 CROHN'S DISEASE OF BOTH SMALL AND LARGE INTESTINE WITHOUT COMPLICATION: ICD-10-CM

## 2023-07-17 DIAGNOSIS — Z90.49 S/P SMALL BOWEL RESECTION: ICD-10-CM

## 2023-07-17 DIAGNOSIS — Z33.1 PREGNANT STATE, INCIDENTAL: Primary | ICD-10-CM

## 2023-07-17 NOTE — TELEPHONE ENCOUNTER
----- Message from Britt Son LPN sent at 7/17/2023  1:23 PM CDT -----    ----- Message -----  From: Zoey Del Castillo  Sent: 7/17/2023  11:41 AM CDT  To: Brad Baer Staff    Pt needs the referral to Dr. Zazueta done by today or Dr. Zazueta will cancel her appt. Please advise. Thank you. Pt #[phone]

## 2023-08-16 ENCOUNTER — TELEPHONE (OUTPATIENT)
Dept: FAMILY MEDICINE | Facility: CLINIC | Age: 28
End: 2023-08-16

## 2023-08-16 DIAGNOSIS — K43.9 HERNIA OF ABDOMINAL WALL: Primary | ICD-10-CM

## 2023-08-16 NOTE — TELEPHONE ENCOUNTER
----- Message from Tejal Chapinallison sent at 8/16/2023  1:45 PM CDT -----  Type: Needs Medical Advice  Who Called:  Ophelia from Dr Sade Zazueta's office  Best Call Back Number: [phone]  Additional Information: Ophelia is calling the office to have a referral sent to [facility] for the pt to be seen by Dr. Heriberto Mireles. The pt needs a hernia repaired.  Please call back to advise Thanks!

## 2023-08-23 ENCOUNTER — TELEPHONE (OUTPATIENT)
Dept: FAMILY MEDICINE | Facility: CLINIC | Age: 28
End: 2023-08-23

## 2023-08-23 DIAGNOSIS — K62.5 RECTAL BLEEDING: ICD-10-CM

## 2023-08-23 DIAGNOSIS — K50.919 CROHN'S DISEASE OF INTESTINE WITH COMPLICATION: Primary | ICD-10-CM

## 2023-08-23 NOTE — TELEPHONE ENCOUNTER
Attempted to call the pt.  Unable to leave VM. Pt responding back on the portal.     Subject:Appointment Request    Appointment Request From: Agnes Matos    With Provider: Rose Torres MD [[facility]]    Preferred Date Range: 8/24/2023 - 8/31/2023    Preferred Times: Any Time    Reason for visit: Gastroenterologist referral    Comments:  Ive been pooping bloody stools for five days. I have Chrons Disease and need to see a Gastroenterologist.

## 2023-08-24 NOTE — TELEPHONE ENCOUNTER
She needs to do a CBC. ([facility] so we can get today)  She needs to see GI soon. If she cant get to them soon, she needs to go to ER if it is too much.    Subjective:  No acute events or changes. Tolerating some PO. No nausea or vomiting. Multiple BMs. Objective:  /65   Pulse 79   Temp (!) 96.6 °F (35.9 °C)   Resp 18   Ht 5' 4\" (1.626 m)   Wt 178 lb (80.7 kg)   LMP 01/01/2020   SpO2 97%   BMI 30.55 kg/m²   General: NAD, AAO  Chest: regular, non-labored  Abdomen: ND    CBC:   Lab Results   Component Value Date/Time    WBC 5.0 12/01/2022 03:12 AM    RBC 4.45 12/01/2022 03:12 AM    HGB 13.1 12/01/2022 03:12 AM    HCT 40.0 12/01/2022 03:12 AM    MCV 89.9 12/01/2022 03:12 AM    MCH 29.4 12/01/2022 03:12 AM    MCHC 32.8 12/01/2022 03:12 AM    RDW 13.2 12/01/2022 03:12 AM     12/01/2022 03:12 AM    MPV 11.7 12/01/2022 03:12 AM     BMP:    Lab Results   Component Value Date/Time     12/01/2022 03:12 AM    K 3.6 12/01/2022 03:12 AM     12/01/2022 03:12 AM    CO2 23 12/01/2022 03:12 AM    BUN 10 12/01/2022 03:12 AM    LABALBU 3.4 12/01/2022 03:12 AM    CREATININE 0.4 12/01/2022 03:12 AM    CALCIUM 8.4 12/01/2022 03:12 AM    GFRAA >59 05/20/2022 02:53 PM    LABGLOM >60 12/01/2022 03:12 AM    GLUCOSE 106 12/01/2022 03:12 AM     Assessment and plan:  52year old female with PSBO vs enteritis  No obstruction on SBFT, having bowel function, feeling better. Okay for diet as tolerated. Okay for dc home from general surgery perspective. No follow up required with general surgery.  Discharge and other cares per the medicine team.

## 2023-08-24 NOTE — TELEPHONE ENCOUNTER
Spoke to pt verbatim per Dr. Baer . Pt voiced  understanding.   States she can not go today, will have to be tomorrow. She does not have anyone to watch her kids. FYI orders signed. Pt states she will call GI today. States she will go to ER if symptoms worsen.

## 2023-08-25 ENCOUNTER — LAB VISIT (OUTPATIENT)
Dept: LAB | Facility: HOSPITAL | Age: 28
End: 2023-08-25
Attending: FAMILY MEDICINE
Payer: OTHER GOVERNMENT

## 2023-08-25 DIAGNOSIS — K50.919 CROHN'S DISEASE OF INTESTINE WITH COMPLICATION: ICD-10-CM

## 2023-08-25 LAB
BASOPHILS # BLD AUTO: 0.02 K/UL (ref 0–0.2)
BASOPHILS NFR BLD: 0.1 % (ref 0–1.9)
DIFFERENTIAL METHOD: ABNORMAL
EOSINOPHIL # BLD AUTO: 0.1 K/UL (ref 0–0.5)
EOSINOPHIL NFR BLD: 0.9 % (ref 0–8)
ERYTHROCYTE [DISTWIDTH] IN BLOOD BY AUTOMATED COUNT: 19.7 % (ref 11.5–14.5)
HCT VFR BLD AUTO: 31.2 % (ref 37–48.5)
HGB BLD-MCNC: 9.3 G/DL (ref 12–16)
IMM GRANULOCYTES # BLD AUTO: 0.12 K/UL (ref 0–0.04)
IMM GRANULOCYTES NFR BLD AUTO: 0.8 % (ref 0–0.5)
LYMPHOCYTES # BLD AUTO: 2 K/UL (ref 1–4.8)
LYMPHOCYTES NFR BLD: 13.2 % (ref 18–48)
MCH RBC QN AUTO: 21.3 PG (ref 27–31)
MCHC RBC AUTO-ENTMCNC: 29.8 G/DL (ref 32–36)
MCV RBC AUTO: 71 FL (ref 82–98)
MONOCYTES # BLD AUTO: 0.7 K/UL (ref 0.3–1)
MONOCYTES NFR BLD: 4.8 % (ref 4–15)
NEUTROPHILS # BLD AUTO: 12.1 K/UL (ref 1.8–7.7)
NEUTROPHILS NFR BLD: 80.2 % (ref 38–73)
NRBC BLD-RTO: 0 /100 WBC
PLATELET # BLD AUTO: 420 K/UL (ref 150–450)
PMV BLD AUTO: 8.3 FL (ref 9.2–12.9)
RBC # BLD AUTO: 4.37 M/UL (ref 4–5.4)
WBC # BLD AUTO: 15.13 K/UL (ref 3.9–12.7)

## 2023-08-25 PROCEDURE — 85025 COMPLETE CBC W/AUTO DIFF WBC: CPT | Performed by: FAMILY MEDICINE

## 2023-08-25 PROCEDURE — 36415 COLL VENOUS BLD VENIPUNCTURE: CPT | Performed by: FAMILY MEDICINE

## 2023-08-28 ENCOUNTER — OFFICE VISIT (OUTPATIENT)
Dept: URGENT CARE | Facility: CLINIC | Age: 28
End: 2023-08-28
Payer: OTHER GOVERNMENT

## 2023-08-28 ENCOUNTER — HOSPITAL ENCOUNTER (INPATIENT)
Facility: HOSPITAL | Age: 28
LOS: 2 days | Discharge: HOME | DRG: 394 | End: 2023-08-31
Attending: EMERGENCY MEDICINE | Admitting: INTERNAL MEDICINE
Payer: OTHER GOVERNMENT

## 2023-08-28 VITALS
OXYGEN SATURATION: 97 % | WEIGHT: 171 LBS | SYSTOLIC BLOOD PRESSURE: 102 MMHG | RESPIRATION RATE: 16 BRPM | BODY MASS INDEX: 30.3 KG/M2 | HEART RATE: 107 BPM | DIASTOLIC BLOOD PRESSURE: 63 MMHG | HEIGHT: 63 IN | TEMPERATURE: 102 F

## 2023-08-28 DIAGNOSIS — K61.0 PERIANAL ABSCESS: ICD-10-CM

## 2023-08-28 DIAGNOSIS — N70.93 TUBO-OVARIAN ABSCESS: ICD-10-CM

## 2023-08-28 DIAGNOSIS — K61.1 PERIRECTAL ABSCESS: Primary | ICD-10-CM

## 2023-08-28 DIAGNOSIS — K61.0 PERIANAL ABSCESS: Primary | ICD-10-CM

## 2023-08-28 DIAGNOSIS — R07.9 CHEST PAIN: ICD-10-CM

## 2023-08-28 LAB
ALBUMIN SERPL BCP-MCNC: 3.8 G/DL (ref 3.5–5.2)
ALP SERPL-CCNC: 142 U/L (ref 55–135)
ALT SERPL W/O P-5'-P-CCNC: 62 U/L (ref 10–44)
ANION GAP SERPL CALC-SCNC: 11 MMOL/L (ref 8–16)
AST SERPL-CCNC: 51 U/L (ref 10–40)
B-HCG UR QL: NEGATIVE
BACTERIA #/AREA URNS HPF: ABNORMAL /HPF
BASOPHILS # BLD AUTO: 0.03 K/UL (ref 0–0.2)
BASOPHILS NFR BLD: 0.2 % (ref 0–1.9)
BILIRUB SERPL-MCNC: 0.9 MG/DL (ref 0.1–1)
BILIRUB UR QL STRIP: NEGATIVE
BUN SERPL-MCNC: 14 MG/DL (ref 6–20)
CALCIUM SERPL-MCNC: 9.1 MG/DL (ref 8.7–10.5)
CHLORIDE SERPL-SCNC: 99 MMOL/L (ref 95–110)
CLARITY UR: ABNORMAL
CO2 SERPL-SCNC: 24 MMOL/L (ref 23–29)
COLOR UR: YELLOW
CREAT SERPL-MCNC: 0.7 MG/DL (ref 0.5–1.4)
CREAT SERPL-MCNC: 0.7 MG/DL (ref 0.5–1.4)
CTP QC/QA: YES
DIFFERENTIAL METHOD: ABNORMAL
EOSINOPHIL # BLD AUTO: 0 K/UL (ref 0–0.5)
EOSINOPHIL NFR BLD: 0.2 % (ref 0–8)
ERYTHROCYTE [DISTWIDTH] IN BLOOD BY AUTOMATED COUNT: 19.3 % (ref 11.5–14.5)
EST. GFR  (NO RACE VARIABLE): >60 ML/MIN/1.73 M^2
GLUCOSE SERPL-MCNC: 89 MG/DL (ref 70–110)
GLUCOSE UR QL STRIP: NEGATIVE
HCT VFR BLD AUTO: 30.7 % (ref 37–48.5)
HGB BLD-MCNC: 9.7 G/DL (ref 12–16)
HGB UR QL STRIP: NEGATIVE
HYALINE CASTS #/AREA URNS LPF: 9 /LPF
IMM GRANULOCYTES # BLD AUTO: 0.08 K/UL (ref 0–0.04)
IMM GRANULOCYTES NFR BLD AUTO: 0.5 % (ref 0–0.5)
KETONES UR QL STRIP: NEGATIVE
LACTATE SERPL-SCNC: 0.8 MMOL/L (ref 0.5–1.9)
LEUKOCYTE ESTERASE UR QL STRIP: ABNORMAL
LYMPHOCYTES # BLD AUTO: 2.1 K/UL (ref 1–4.8)
LYMPHOCYTES NFR BLD: 13 % (ref 18–48)
MCH RBC QN AUTO: 21.9 PG (ref 27–31)
MCHC RBC AUTO-ENTMCNC: 31.6 G/DL (ref 32–36)
MCV RBC AUTO: 70 FL (ref 82–98)
MICROSCOPIC COMMENT: ABNORMAL
MONOCYTES # BLD AUTO: 0.8 K/UL (ref 0.3–1)
MONOCYTES NFR BLD: 4.9 % (ref 4–15)
NEUTROPHILS # BLD AUTO: 13.2 K/UL (ref 1.8–7.7)
NEUTROPHILS NFR BLD: 81.2 % (ref 38–73)
NITRITE UR QL STRIP: NEGATIVE
NRBC BLD-RTO: 0 /100 WBC
PH UR STRIP: 6 [PH] (ref 5–8)
PLATELET # BLD AUTO: 457 K/UL (ref 150–450)
PMV BLD AUTO: 8.7 FL (ref 9.2–12.9)
POTASSIUM SERPL-SCNC: 3.7 MMOL/L (ref 3.5–5.1)
PROT SERPL-MCNC: 8 G/DL (ref 6–8.4)
PROT UR QL STRIP: NEGATIVE
RBC # BLD AUTO: 4.42 M/UL (ref 4–5.4)
RBC #/AREA URNS HPF: 2 /HPF (ref 0–4)
SAMPLE: NORMAL
SODIUM SERPL-SCNC: 134 MMOL/L (ref 136–145)
SP GR UR STRIP: 1.02 (ref 1–1.03)
SQUAMOUS #/AREA URNS HPF: 4 /HPF
URN SPEC COLLECT METH UR: ABNORMAL
UROBILINOGEN UR STRIP-ACNC: NEGATIVE EU/DL
WBC # BLD AUTO: 16.27 K/UL (ref 3.9–12.7)
WBC #/AREA URNS HPF: >100 /HPF (ref 0–5)

## 2023-08-28 PROCEDURE — 87086 URINE CULTURE/COLONY COUNT: CPT | Performed by: EMERGENCY MEDICINE

## 2023-08-28 PROCEDURE — 85025 COMPLETE CBC W/AUTO DIFF WBC: CPT | Performed by: EMERGENCY MEDICINE

## 2023-08-28 PROCEDURE — 96366 THER/PROPH/DIAG IV INF ADDON: CPT

## 2023-08-28 PROCEDURE — 81001 URINALYSIS AUTO W/SCOPE: CPT | Performed by: EMERGENCY MEDICINE

## 2023-08-28 PROCEDURE — 99204 OFFICE O/P NEW MOD 45 MIN: CPT | Mod: S$GLB,,,

## 2023-08-28 PROCEDURE — 36415 COLL VENOUS BLD VENIPUNCTURE: CPT | Performed by: EMERGENCY MEDICINE

## 2023-08-28 PROCEDURE — 82565 ASSAY OF CREATININE: CPT

## 2023-08-28 PROCEDURE — 83605 ASSAY OF LACTIC ACID: CPT | Performed by: EMERGENCY MEDICINE

## 2023-08-28 PROCEDURE — 63600175 PHARM REV CODE 636 W HCPCS: Performed by: EMERGENCY MEDICINE

## 2023-08-28 PROCEDURE — 96365 THER/PROPH/DIAG IV INF INIT: CPT

## 2023-08-28 PROCEDURE — 87040 BLOOD CULTURE FOR BACTERIA: CPT | Mod: 59 | Performed by: EMERGENCY MEDICINE

## 2023-08-28 PROCEDURE — 81025 URINE PREGNANCY TEST: CPT

## 2023-08-28 PROCEDURE — 25500020 PHARM REV CODE 255: Performed by: EMERGENCY MEDICINE

## 2023-08-28 PROCEDURE — 80053 COMPREHEN METABOLIC PANEL: CPT | Performed by: EMERGENCY MEDICINE

## 2023-08-28 PROCEDURE — 99204 PR OFFICE/OUTPT VISIT, NEW, LEVL IV, 45-59 MIN: ICD-10-PCS | Mod: S$GLB,,,

## 2023-08-28 PROCEDURE — 99285 EMERGENCY DEPT VISIT HI MDM: CPT | Mod: 25

## 2023-08-28 RX ORDER — HYDROCODONE BITARTRATE AND ACETAMINOPHEN 10; 325 MG/1; MG/1
1 TABLET ORAL
Status: COMPLETED | OUTPATIENT
Start: 2023-08-29 | End: 2023-08-29

## 2023-08-28 RX ADMIN — IOHEXOL 100 ML: 350 INJECTION, SOLUTION INTRAVENOUS at 06:08

## 2023-08-28 RX ADMIN — Medication 3.38 G: at 10:08

## 2023-08-28 NOTE — PROGRESS NOTES
"Subjective:      Patient ID: Agnes Matos is a 28 y.o. female.    Vitals:  height is 5' 3" (1.6 m) and weight is 77.6 kg (171 lb). Her temperature is 102 °F (38.9 °C) (abnormal). Her blood pressure is 102/63 and her pulse is 107. Her respiration is 16 and oxygen saturation is 97%.     Chief Complaint: Abscess    Cera, 9 weeks postpartum and breastfeeding, presents to clinic with a chief complaint of draining abscess left side of buttocks that is bigger than a golf ball.  Perirectal area hyperpigmented, fluctuant, tender, warm to touch, and blistered.  No drainage noted.  Patient states it started draining yesterday on & off.  She has been packing unaware with toilet paper and noticed blood/white pus draining.  Patient is tender to palpation, and painful sitting.  Patient reports chills, denies measured temperature.  She denies any changes in her bowel movements.  She initially thought that she started her menstrual cycle, but has had a different sensation.  Patient also reports dermoid cyst in left lower quadrant of abdomen.  She states this is tied to her uterus, ureter, and colon.  No testing performed in clinic.  Did not attempt to defervesce patient.  Suggested patient go to emergency room for further treatment.      Suspect sepsis and deferred for er treatment.       Constitution: Negative for fever and generalized weakness.   Gastrointestinal:  Positive for history of abdominal surgery and hemorrhoids.        Hernia     Skin:  Positive for skin thickening/induration and abscess.      Objective:     Physical Exam   Constitutional: She is oriented to person, place, and time. She appears well-developed. She is cooperative.   HENT:   Head: Normocephalic and atraumatic.   Ears:   Right Ear: Hearing, tympanic membrane, external ear and ear canal normal.   Left Ear: Hearing, tympanic membrane, external ear and ear canal normal.   Nose: Nose normal. No mucosal edema or nasal deformity. No epistaxis. Right sinus exhibits " no maxillary sinus tenderness and no frontal sinus tenderness. Left sinus exhibits no maxillary sinus tenderness and no frontal sinus tenderness.   Mouth/Throat: Uvula is midline, oropharynx is clear and moist and mucous membranes are normal. No trismus in the jaw. Normal dentition. No uvula swelling.   Eyes: Conjunctivae and lids are normal.   Neck: Trachea normal and phonation normal. Neck supple.   Cardiovascular: Normal rate, regular rhythm, normal heart sounds and normal pulses.   Pulmonary/Chest: Effort normal and breath sounds normal.   Abdominal: Normal appearance and bowel sounds are normal. Soft.   Genitourinary: Rectum:      Tenderness present.                 Comments: Erythematous, vesicular, indurated, fluctuant     Musculoskeletal: Normal range of motion.         General: Normal range of motion.   Neurological: She is alert and oriented to person, place, and time. She exhibits normal muscle tone.   Skin: Skin is warm, dry and intact.   Psychiatric: Her speech is normal and behavior is normal. Judgment and thought content normal.   Nursing note and vitals reviewed.chaperone present       Assessment:     1. Perianal abscess        Plan:       Perianal abscess    Suspected sepsis    Emergency room care for further evaluation, treatment, and diagnosis    Patient agrees with plan, Release signed

## 2023-08-28 NOTE — FIRST PROVIDER EVALUATION
"Medical screening examination initiated.  I have conducted a focused provider triage encounter, findings are as follows:    Brief history of present illness:  Abscess    Vitals:    08/28/23 1651   BP: 123/73   Pulse: (!) 130   Resp: 20   Temp: 99.9 °F (37.7 °C)   TempSrc: Oral   SpO2: 96%   Weight: 77.6 kg (171 lb)   Height: 5' 3" (1.6 m)       Pertinent physical exam:  Patient reports she was sent from urgent care secondary to a rectal abscess reports had a fever of 102 earlier today    Brief workup plan:  Labs, CT imaging    Preliminary workup initiated; this workup will be continued and followed by the physician or advanced practice provider that is assigned to the patient when roomed.  "

## 2023-08-29 ENCOUNTER — ANESTHESIA (OUTPATIENT)
Dept: SURGERY | Facility: HOSPITAL | Age: 28
DRG: 394 | End: 2023-08-29
Payer: OTHER GOVERNMENT

## 2023-08-29 ENCOUNTER — ANESTHESIA EVENT (OUTPATIENT)
Dept: SURGERY | Facility: HOSPITAL | Age: 28
DRG: 394 | End: 2023-08-29
Payer: OTHER GOVERNMENT

## 2023-08-29 PROBLEM — K61.0 PERIANAL ABSCESS: Status: ACTIVE | Noted: 2023-08-29

## 2023-08-29 PROBLEM — N85.8 UTERINE MASS: Status: ACTIVE | Noted: 2023-08-29

## 2023-08-29 PROBLEM — N70.93 TUBO-OVARIAN ABSCESS: Status: ACTIVE | Noted: 2023-08-29

## 2023-08-29 LAB
ALBUMIN SERPL BCP-MCNC: 3.2 G/DL (ref 3.5–5.2)
ALP SERPL-CCNC: 119 U/L (ref 55–135)
ALT SERPL W/O P-5'-P-CCNC: 46 U/L (ref 10–44)
ANION GAP SERPL CALC-SCNC: 7 MMOL/L (ref 8–16)
AST SERPL-CCNC: 31 U/L (ref 10–40)
BASOPHILS # BLD AUTO: 0.03 K/UL (ref 0–0.2)
BASOPHILS NFR BLD: 0.2 % (ref 0–1.9)
BILIRUB SERPL-MCNC: 1.1 MG/DL (ref 0.1–1)
BUN SERPL-MCNC: 12 MG/DL (ref 6–20)
CALCIUM SERPL-MCNC: 8.9 MG/DL (ref 8.7–10.5)
CHLORIDE SERPL-SCNC: 102 MMOL/L (ref 95–110)
CO2 SERPL-SCNC: 28 MMOL/L (ref 23–29)
CREAT SERPL-MCNC: 0.8 MG/DL (ref 0.5–1.4)
DIFFERENTIAL METHOD: ABNORMAL
EOSINOPHIL # BLD AUTO: 0.1 K/UL (ref 0–0.5)
EOSINOPHIL NFR BLD: 0.8 % (ref 0–8)
ERYTHROCYTE [DISTWIDTH] IN BLOOD BY AUTOMATED COUNT: 19.3 % (ref 11.5–14.5)
EST. GFR  (NO RACE VARIABLE): >60 ML/MIN/1.73 M^2
GLUCOSE SERPL-MCNC: 99 MG/DL (ref 70–110)
HCT VFR BLD AUTO: 29.6 % (ref 37–48.5)
HGB BLD-MCNC: 9 G/DL (ref 12–16)
IMM GRANULOCYTES # BLD AUTO: 0.06 K/UL (ref 0–0.04)
IMM GRANULOCYTES NFR BLD AUTO: 0.5 % (ref 0–0.5)
LYMPHOCYTES # BLD AUTO: 2 K/UL (ref 1–4.8)
LYMPHOCYTES NFR BLD: 15.4 % (ref 18–48)
MAGNESIUM SERPL-MCNC: 1.8 MG/DL (ref 1.6–2.6)
MCH RBC QN AUTO: 21.7 PG (ref 27–31)
MCHC RBC AUTO-ENTMCNC: 30.4 G/DL (ref 32–36)
MCV RBC AUTO: 71 FL (ref 82–98)
MONOCYTES # BLD AUTO: 0.9 K/UL (ref 0.3–1)
MONOCYTES NFR BLD: 7.1 % (ref 4–15)
MRSA SCREEN BY PCR: NEGATIVE
NEUTROPHILS # BLD AUTO: 9.8 K/UL (ref 1.8–7.7)
NEUTROPHILS NFR BLD: 76 % (ref 38–73)
NRBC BLD-RTO: 0 /100 WBC
PHOSPHATE SERPL-MCNC: 5 MG/DL (ref 2.7–4.5)
PLATELET # BLD AUTO: 379 K/UL (ref 150–450)
PMV BLD AUTO: 8.6 FL (ref 9.2–12.9)
POTASSIUM SERPL-SCNC: 3.5 MMOL/L (ref 3.5–5.1)
PROT SERPL-MCNC: 7.1 G/DL (ref 6–8.4)
RBC # BLD AUTO: 4.15 M/UL (ref 4–5.4)
SODIUM SERPL-SCNC: 137 MMOL/L (ref 136–145)
WBC # BLD AUTO: 12.95 K/UL (ref 3.9–12.7)

## 2023-08-29 PROCEDURE — 87077 CULTURE AEROBIC IDENTIFY: CPT | Performed by: SURGERY

## 2023-08-29 PROCEDURE — D9220A PRA ANESTHESIA: Mod: ANES,,, | Performed by: ANESTHESIOLOGY

## 2023-08-29 PROCEDURE — 87206 SMEAR FLUORESCENT/ACID STAI: CPT | Performed by: SURGERY

## 2023-08-29 PROCEDURE — 63600175 PHARM REV CODE 636 W HCPCS: Performed by: NURSE ANESTHETIST, CERTIFIED REGISTERED

## 2023-08-29 PROCEDURE — 63600175 PHARM REV CODE 636 W HCPCS: Performed by: SURGERY

## 2023-08-29 PROCEDURE — 71000033 HC RECOVERY, INTIAL HOUR: Performed by: SURGERY

## 2023-08-29 PROCEDURE — 37000009 HC ANESTHESIA EA ADD 15 MINS: Performed by: SURGERY

## 2023-08-29 PROCEDURE — 80053 COMPREHEN METABOLIC PANEL: CPT | Performed by: NURSE PRACTITIONER

## 2023-08-29 PROCEDURE — 63600175 PHARM REV CODE 636 W HCPCS: Performed by: NURSE PRACTITIONER

## 2023-08-29 PROCEDURE — D9220A PRA ANESTHESIA: ICD-10-PCS | Mod: ANES,,, | Performed by: ANESTHESIOLOGY

## 2023-08-29 PROCEDURE — 87147 CULTURE TYPE IMMUNOLOGIC: CPT | Performed by: SURGERY

## 2023-08-29 PROCEDURE — 25000003 PHARM REV CODE 250: Performed by: NURSE ANESTHETIST, CERTIFIED REGISTERED

## 2023-08-29 PROCEDURE — 27202107 HC XP QUATRO SENSOR: Performed by: ANESTHESIOLOGY

## 2023-08-29 PROCEDURE — 46050 PR I&D PERIANAL ABSCESS,SUPERFICIAL: ICD-10-PCS | Mod: ,,, | Performed by: SURGERY

## 2023-08-29 PROCEDURE — 87491 CHLMYD TRACH DNA AMP PROBE: CPT | Performed by: SURGERY

## 2023-08-29 PROCEDURE — 87205 SMEAR GRAM STAIN: CPT | Performed by: SURGERY

## 2023-08-29 PROCEDURE — 27000673 HC TUBING BLOOD Y: Performed by: ANESTHESIOLOGY

## 2023-08-29 PROCEDURE — D9220A PRA ANESTHESIA: ICD-10-PCS | Mod: CRNA,,, | Performed by: NURSE ANESTHETIST, CERTIFIED REGISTERED

## 2023-08-29 PROCEDURE — 96366 THER/PROPH/DIAG IV INF ADDON: CPT

## 2023-08-29 PROCEDURE — 25000003 PHARM REV CODE 250: Performed by: SURGERY

## 2023-08-29 PROCEDURE — 87070 CULTURE OTHR SPECIMN AEROBIC: CPT | Mod: 59 | Performed by: SURGERY

## 2023-08-29 PROCEDURE — 87186 SC STD MICRODIL/AGAR DIL: CPT | Performed by: SURGERY

## 2023-08-29 PROCEDURE — 12000002 HC ACUTE/MED SURGE SEMI-PRIVATE ROOM

## 2023-08-29 PROCEDURE — 87075 CULTR BACTERIA EXCEPT BLOOD: CPT | Performed by: SURGERY

## 2023-08-29 PROCEDURE — 85025 COMPLETE CBC W/AUTO DIFF WBC: CPT | Performed by: NURSE PRACTITIONER

## 2023-08-29 PROCEDURE — 46050 I&D PERIANAL ABSCESS SUPFC: CPT | Mod: ,,, | Performed by: SURGERY

## 2023-08-29 PROCEDURE — 36415 COLL VENOUS BLD VENIPUNCTURE: CPT | Performed by: NURSE PRACTITIONER

## 2023-08-29 PROCEDURE — C9290 INJ, BUPIVACAINE LIPOSOME: HCPCS | Performed by: SURGERY

## 2023-08-29 PROCEDURE — 25000003 PHARM REV CODE 250: Performed by: INTERNAL MEDICINE

## 2023-08-29 PROCEDURE — 71000039 HC RECOVERY, EACH ADD'L HOUR: Performed by: SURGERY

## 2023-08-29 PROCEDURE — 27201423 OPTIME MED/SURG SUP & DEVICES STERILE SUPPLY: Performed by: SURGERY

## 2023-08-29 PROCEDURE — 63600175 PHARM REV CODE 636 W HCPCS: Performed by: ANESTHESIOLOGY

## 2023-08-29 PROCEDURE — 87102 FUNGUS ISOLATION CULTURE: CPT | Mod: 59 | Performed by: SURGERY

## 2023-08-29 PROCEDURE — 25000003 PHARM REV CODE 250: Performed by: EMERGENCY MEDICINE

## 2023-08-29 PROCEDURE — 36000704 HC OR TIME LEV I 1ST 15 MIN: Performed by: SURGERY

## 2023-08-29 PROCEDURE — 87641 MR-STAPH DNA AMP PROBE: CPT | Performed by: NURSE PRACTITIONER

## 2023-08-29 PROCEDURE — 25000003 PHARM REV CODE 250: Performed by: ANESTHESIOLOGY

## 2023-08-29 PROCEDURE — 87116 MYCOBACTERIA CULTURE: CPT | Performed by: SURGERY

## 2023-08-29 PROCEDURE — 37000008 HC ANESTHESIA 1ST 15 MINUTES: Performed by: SURGERY

## 2023-08-29 PROCEDURE — 84100 ASSAY OF PHOSPHORUS: CPT | Performed by: NURSE PRACTITIONER

## 2023-08-29 PROCEDURE — 25000003 PHARM REV CODE 250: Performed by: NURSE PRACTITIONER

## 2023-08-29 PROCEDURE — 83735 ASSAY OF MAGNESIUM: CPT | Performed by: NURSE PRACTITIONER

## 2023-08-29 PROCEDURE — 87591 N.GONORRHOEAE DNA AMP PROB: CPT | Performed by: SURGERY

## 2023-08-29 PROCEDURE — 36000705 HC OR TIME LEV I EA ADD 15 MIN: Performed by: SURGERY

## 2023-08-29 PROCEDURE — D9220A PRA ANESTHESIA: Mod: CRNA,,, | Performed by: NURSE ANESTHETIST, CERTIFIED REGISTERED

## 2023-08-29 PROCEDURE — 27201107 HC STYLET, STANDARD: Performed by: ANESTHESIOLOGY

## 2023-08-29 RX ORDER — SODIUM CHLORIDE 0.9 % (FLUSH) 0.9 %
10 SYRINGE (ML) INJECTION EVERY 12 HOURS PRN
Status: DISCONTINUED | OUTPATIENT
Start: 2023-08-29 | End: 2023-08-31 | Stop reason: HOSPADM

## 2023-08-29 RX ORDER — OXYCODONE HYDROCHLORIDE 5 MG/1
5 TABLET ORAL
Status: DISCONTINUED | OUTPATIENT
Start: 2023-08-29 | End: 2023-08-29 | Stop reason: HOSPADM

## 2023-08-29 RX ORDER — SODIUM,POTASSIUM PHOSPHATES 280-250MG
2 POWDER IN PACKET (EA) ORAL
Status: DISCONTINUED | OUTPATIENT
Start: 2023-08-29 | End: 2023-08-31 | Stop reason: HOSPADM

## 2023-08-29 RX ORDER — METRONIDAZOLE 500 MG/100ML
500 INJECTION, SOLUTION INTRAVENOUS
Status: DISCONTINUED | OUTPATIENT
Start: 2023-08-29 | End: 2023-08-31 | Stop reason: HOSPADM

## 2023-08-29 RX ORDER — OXYCODONE AND ACETAMINOPHEN 10; 325 MG/1; MG/1
1 TABLET ORAL EVERY 4 HOURS PRN
Status: DISCONTINUED | OUTPATIENT
Start: 2023-08-29 | End: 2023-08-31 | Stop reason: HOSPADM

## 2023-08-29 RX ORDER — DIPHENHYDRAMINE HYDROCHLORIDE 50 MG/ML
12.5 INJECTION INTRAMUSCULAR; INTRAVENOUS
Status: DISCONTINUED | OUTPATIENT
Start: 2023-08-29 | End: 2023-08-29 | Stop reason: HOSPADM

## 2023-08-29 RX ORDER — SODIUM CHLORIDE, SODIUM LACTATE, POTASSIUM CHLORIDE, CALCIUM CHLORIDE 600; 310; 30; 20 MG/100ML; MG/100ML; MG/100ML; MG/100ML
INJECTION, SOLUTION INTRAVENOUS CONTINUOUS
Status: DISCONTINUED | OUTPATIENT
Start: 2023-08-29 | End: 2023-08-31

## 2023-08-29 RX ORDER — POLYETHYLENE GLYCOL 3350 17 G/17G
17 POWDER, FOR SOLUTION ORAL DAILY
Status: DISCONTINUED | OUTPATIENT
Start: 2023-08-29 | End: 2023-08-31 | Stop reason: HOSPADM

## 2023-08-29 RX ORDER — ACETAMINOPHEN 10 MG/ML
INJECTION, SOLUTION INTRAVENOUS
Status: DISCONTINUED | OUTPATIENT
Start: 2023-08-29 | End: 2023-08-29

## 2023-08-29 RX ORDER — FENTANYL CITRATE 50 UG/ML
25 INJECTION, SOLUTION INTRAMUSCULAR; INTRAVENOUS EVERY 5 MIN PRN
Status: DISCONTINUED | OUTPATIENT
Start: 2023-08-29 | End: 2023-08-29 | Stop reason: HOSPADM

## 2023-08-29 RX ORDER — FAMOTIDINE 10 MG/ML
INJECTION INTRAVENOUS
Status: DISCONTINUED | OUTPATIENT
Start: 2023-08-29 | End: 2023-08-29

## 2023-08-29 RX ORDER — ACETAMINOPHEN 325 MG/1
650 TABLET ORAL EVERY 4 HOURS PRN
Status: DISCONTINUED | OUTPATIENT
Start: 2023-08-29 | End: 2023-08-31 | Stop reason: HOSPADM

## 2023-08-29 RX ORDER — SUCCINYLCHOLINE CHLORIDE 20 MG/ML
INJECTION INTRAMUSCULAR; INTRAVENOUS
Status: DISCONTINUED | OUTPATIENT
Start: 2023-08-29 | End: 2023-08-29

## 2023-08-29 RX ORDER — MORPHINE SULFATE 2 MG/ML
2 INJECTION, SOLUTION INTRAMUSCULAR; INTRAVENOUS EVERY 4 HOURS PRN
Status: DISCONTINUED | OUTPATIENT
Start: 2023-08-29 | End: 2023-08-29

## 2023-08-29 RX ORDER — FENTANYL CITRATE 50 UG/ML
INJECTION, SOLUTION INTRAMUSCULAR; INTRAVENOUS
Status: DISCONTINUED | OUTPATIENT
Start: 2023-08-29 | End: 2023-08-29

## 2023-08-29 RX ORDER — ONDANSETRON 2 MG/ML
4 INJECTION INTRAMUSCULAR; INTRAVENOUS DAILY PRN
Status: DISCONTINUED | OUTPATIENT
Start: 2023-08-29 | End: 2023-08-29 | Stop reason: HOSPADM

## 2023-08-29 RX ORDER — LIDOCAINE HYDROCHLORIDE 20 MG/ML
INJECTION, SOLUTION EPIDURAL; INFILTRATION; INTRACAUDAL; PERINEURAL
Status: DISCONTINUED | OUTPATIENT
Start: 2023-08-29 | End: 2023-08-29

## 2023-08-29 RX ORDER — ROCURONIUM BROMIDE 10 MG/ML
INJECTION, SOLUTION INTRAVENOUS
Status: DISCONTINUED | OUTPATIENT
Start: 2023-08-29 | End: 2023-08-29

## 2023-08-29 RX ORDER — ONDANSETRON 2 MG/ML
INJECTION INTRAMUSCULAR; INTRAVENOUS
Status: DISCONTINUED | OUTPATIENT
Start: 2023-08-29 | End: 2023-08-29

## 2023-08-29 RX ORDER — ONDANSETRON 2 MG/ML
4 INJECTION INTRAMUSCULAR; INTRAVENOUS EVERY 8 HOURS PRN
Status: DISCONTINUED | OUTPATIENT
Start: 2023-08-29 | End: 2023-08-31 | Stop reason: HOSPADM

## 2023-08-29 RX ORDER — LANOLIN ALCOHOL/MO/W.PET/CERES
800 CREAM (GRAM) TOPICAL
Status: DISCONTINUED | OUTPATIENT
Start: 2023-08-29 | End: 2023-08-31 | Stop reason: HOSPADM

## 2023-08-29 RX ORDER — MORPHINE SULFATE 2 MG/ML
2 INJECTION, SOLUTION INTRAMUSCULAR; INTRAVENOUS EVERY 4 HOURS PRN
Status: DISCONTINUED | OUTPATIENT
Start: 2023-08-29 | End: 2023-08-31 | Stop reason: HOSPADM

## 2023-08-29 RX ORDER — BUPIVACAINE HCL/EPINEPHRINE 0.25-.0005
VIAL (ML) INJECTION
Status: DISCONTINUED | OUTPATIENT
Start: 2023-08-29 | End: 2023-08-29 | Stop reason: HOSPADM

## 2023-08-29 RX ORDER — IBUPROFEN 200 MG
24 TABLET ORAL
Status: DISCONTINUED | OUTPATIENT
Start: 2023-08-29 | End: 2023-08-31 | Stop reason: HOSPADM

## 2023-08-29 RX ORDER — IBUPROFEN 200 MG
16 TABLET ORAL
Status: DISCONTINUED | OUTPATIENT
Start: 2023-08-29 | End: 2023-08-31 | Stop reason: HOSPADM

## 2023-08-29 RX ORDER — GLUCAGON 1 MG
1 KIT INJECTION
Status: DISCONTINUED | OUTPATIENT
Start: 2023-08-29 | End: 2023-08-31 | Stop reason: HOSPADM

## 2023-08-29 RX ORDER — PROPOFOL 10 MG/ML
VIAL (ML) INTRAVENOUS
Status: DISCONTINUED | OUTPATIENT
Start: 2023-08-29 | End: 2023-08-29

## 2023-08-29 RX ORDER — MIDAZOLAM HYDROCHLORIDE 1 MG/ML
INJECTION INTRAMUSCULAR; INTRAVENOUS
Status: DISCONTINUED | OUTPATIENT
Start: 2023-08-29 | End: 2023-08-29

## 2023-08-29 RX ORDER — NALOXONE HCL 0.4 MG/ML
0.02 VIAL (ML) INJECTION
Status: DISCONTINUED | OUTPATIENT
Start: 2023-08-29 | End: 2023-08-31 | Stop reason: HOSPADM

## 2023-08-29 RX ORDER — TALC
6 POWDER (GRAM) TOPICAL NIGHTLY PRN
Status: DISCONTINUED | OUTPATIENT
Start: 2023-08-29 | End: 2023-08-31 | Stop reason: HOSPADM

## 2023-08-29 RX ADMIN — PROPOFOL 150 MG: 10 INJECTION, EMULSION INTRAVENOUS at 01:08

## 2023-08-29 RX ADMIN — ONDANSETRON 4 MG: 2 INJECTION INTRAMUSCULAR; INTRAVENOUS at 01:08

## 2023-08-29 RX ADMIN — ACETAMINOPHEN 500 MG: 10 INJECTION, SOLUTION INTRAVENOUS at 01:08

## 2023-08-29 RX ADMIN — Medication 120 MG: at 01:08

## 2023-08-29 RX ADMIN — LIDOCAINE HYDROCHLORIDE 50 MG: 20 INJECTION, SOLUTION INTRAVENOUS at 01:08

## 2023-08-29 RX ADMIN — ROCURONIUM BROMIDE 5 MG: 10 INJECTION, SOLUTION INTRAVENOUS at 01:08

## 2023-08-29 RX ADMIN — FENTANYL CITRATE 50 MCG: 50 INJECTION INTRAMUSCULAR; INTRAVENOUS at 01:08

## 2023-08-29 RX ADMIN — FAMOTIDINE 20 MG: 10 INJECTION, SOLUTION INTRAVENOUS at 01:08

## 2023-08-29 RX ADMIN — DOXYCYCLINE 100 MG: 100 INJECTION, POWDER, LYOPHILIZED, FOR SOLUTION INTRAVENOUS at 07:08

## 2023-08-29 RX ADMIN — OXYCODONE HYDROCHLORIDE AND ACETAMINOPHEN 1 TABLET: 10; 325 TABLET ORAL at 07:08

## 2023-08-29 RX ADMIN — METRONIDAZOLE 500 MG: 5 INJECTION, SOLUTION INTRAVENOUS at 05:08

## 2023-08-29 RX ADMIN — FENTANYL CITRATE 25 MCG: 50 INJECTION, SOLUTION INTRAMUSCULAR; INTRAVENOUS at 02:08

## 2023-08-29 RX ADMIN — CEFTRIAXONE SODIUM 1 G: 1 INJECTION, POWDER, FOR SOLUTION INTRAMUSCULAR; INTRAVENOUS at 04:08

## 2023-08-29 RX ADMIN — PIPERACILLIN AND TAZOBACTAM 3.38 G: 3; .375 INJECTION, POWDER, LYOPHILIZED, FOR SOLUTION INTRAVENOUS; PARENTERAL at 07:08

## 2023-08-29 RX ADMIN — OXYCODONE HYDROCHLORIDE 5 MG: 5 TABLET ORAL at 02:08

## 2023-08-29 RX ADMIN — POTASSIUM BICARBONATE 50 MEQ: 977.5 TABLET, EFFERVESCENT ORAL at 10:08

## 2023-08-29 RX ADMIN — MIDAZOLAM HYDROCHLORIDE 2 MG: 1 INJECTION, SOLUTION INTRAMUSCULAR; INTRAVENOUS at 01:08

## 2023-08-29 RX ADMIN — HYDROCODONE BITARTRATE AND ACETAMINOPHEN 1 TABLET: 10; 325 TABLET ORAL at 12:08

## 2023-08-29 RX ADMIN — SODIUM CHLORIDE, SODIUM LACTATE, POTASSIUM CHLORIDE, AND CALCIUM CHLORIDE: .6; .31; .03; .02 INJECTION, SOLUTION INTRAVENOUS at 01:08

## 2023-08-29 RX ADMIN — SODIUM CHLORIDE, SODIUM LACTATE, POTASSIUM CHLORIDE, AND CALCIUM CHLORIDE: .6; .31; .03; .02 INJECTION, SOLUTION INTRAVENOUS at 03:08

## 2023-08-29 NOTE — TRANSFER OF CARE
"Anesthesia Transfer of Care Note    Patient: Agnes Matos    Procedure(s) Performed: Procedure(s) (LRB):  INCISION, ABSCESS, PERIRECTAL (N/A)    Patient location: PACU    Anesthesia Type: general    Transport from OR: Transported from OR on room air with adequate spontaneous ventilation    Post pain: adequate analgesia    Post assessment: no apparent anesthetic complications and tolerated procedure well    Post vital signs: stable    Level of consciousness: awake    Nausea/Vomiting: no nausea/vomiting    Complications: none    Transfer of care protocol was followed      Last vitals:   Visit Vitals  BP (!) 113/15   Pulse 87   Temp 36.7 °C (98 °F) (Oral)   Resp 18   Ht 5' 3" (1.6 m)   Wt 76.7 kg (169 lb 1.5 oz)   LMP 09/01/2022 (Approximate)   SpO2 98%   Breastfeeding Yes   BMI 29.95 kg/m²     "

## 2023-08-29 NOTE — HPI
27 y/o with hx of crohns presents with pain and swelling to kanu anal area.  Hx of ovarian cyst/mass, seeing surgical gyn oncology at [facility]. Dr. Zazueta and was planning for resection in 1-2 weeks.  She reports fever up to 102 and complains of some abdominal pain at my interview.

## 2023-08-29 NOTE — SUBJECTIVE & OBJECTIVE
No current facility-administered medications on file prior to encounter.     Current Outpatient Medications on File Prior to Encounter   Medication Sig    oxyCODONE-acetaminophen (PERCOCET) 5-325 mg per tablet Take 1 tablet by mouth every 6 (six) hours as needed for Pain. (Patient not taking: Reported on 2023)       Review of patient's allergies indicates:   Allergen Reactions    Methotrexate Swelling     Hands face      Stelara [ustekinumab] Hives    Vedolizumab Hives    Adalimumab Rash, Itching and Hives     joint ache      Renflexis [infliximab-abda] Rash     Other reaction(s): Rash or Itch, Other: joint ache, Rash or Itch, Other: joint ache       Past Medical History:   Diagnosis Date    Anxiety     Cholestasis during pregnancy     Crohn's colitis     Depression     Endometriosis     GERD (gastroesophageal reflux disease)     IBS (irritable bowel syndrome)     TMJ (dislocation of temporomandibular joint)      Past Surgical History:   Procedure Laterality Date     SECTION  17 &     Two births     SECTION N/A 2023    Procedure:  SECTION;  Surgeon: Lisha Mcdonough MD;  Location: [facility] L&D;  Service: OB/GYN;  Laterality: N/A;    COLON SURGERY  21    A small part along with tbe small bowel    COLONOSCOPY N/A 2022    Procedure: COLONOSCOPY;  Surgeon: Gregorio Bourne MD;  Location: [facility];  Service: Gastroenterology;  Laterality: N/A;    CYSTOSCOPY W/ URETERAL STENT PLACEMENT Right 2/10/2023    Procedure: CYSTOSCOPY, WITH URETERAL STENT INSERTION;  Surgeon: Marry Erickson MD;  Location: [facility] OR;  Service: Urology;  Laterality: Right;    DIAGNOSTIC LAPAROSCOPY Left 2022    Procedure: LAPAROSCOPY, DIAGNOSTIC- SAMPLING OF PERITONEAL FLUID;  Surgeon: Yajaira Siegel MD;  Location: [facility];  Service: OB/GYN;  Laterality: Left;    presacral neurectomy      2020    SMALL INTESTINE SURGERY  2021    partial with partial large bowel    SURGICAL  REMOVAL OF ENDOMETRIOSIS      TONSILLECTOMY  2006    TUBAL LIGATION  10-26-19    After c-secrtion    URETEROSCOPIC REMOVAL OF URETERIC CALCULUS Right 2/10/2023    Procedure: REMOVAL, CALCULUS, URETER, URETEROSCOPIC;  Surgeon: Marry Erickson MD;  Location: [facility];  Service: Urology;  Laterality: Right;     Family History    None       Tobacco Use    Smoking status: Never    Smokeless tobacco: Never   Substance and Sexual Activity    Alcohol use: Not Currently     Comment: Social once every six months    Drug use: Never    Sexual activity: Yes     Partners: Male     Birth control/protection: See Surgical Hx     Comment: Tubal ligation     Review of Systems   Constitutional:  Positive for fever. Negative for chills and diaphoresis.   HENT:  Negative for congestion, postnasal drip, sinus pressure and sore throat.    Eyes:  Negative for visual disturbance.   Respiratory:  Negative for cough, chest tightness, shortness of breath and wheezing.    Cardiovascular:  Negative for chest pain, palpitations and leg swelling.   Gastrointestinal:  Negative for abdominal distention, abdominal pain, blood in stool, constipation, diarrhea, nausea and vomiting.   Endocrine: Negative.    Genitourinary:  Negative for dysuria.   Musculoskeletal: Negative.    Skin:  Positive for wound (perianal abscess).   Allergic/Immunologic: Negative.    Neurological:  Negative for dizziness, weakness, numbness and headaches.   Hematological: Negative.    Psychiatric/Behavioral: Negative.       Objective:     Vital Signs (Most Recent):  Temp: 98 °F (36.7 °C) (08/29/23 1128)  Pulse: 87 (08/29/23 1128)  Resp: 18 (08/29/23 1128)  BP: (!) 113/15 (08/29/23 1128)  SpO2: 98 % (08/29/23 1128) Vital Signs (24h Range):  Temp:  [97.2 °F (36.2 °C)-102 °F (38.9 °C)] 98 °F (36.7 °C)  Pulse:  [] 87  Resp:  [16-20] 18  SpO2:  [96 %-100 %] 98 %  BP: ()/(15-73) 113/15     Weight: 76.7 kg (169 lb 1.5 oz)  Body mass index is 29.95 kg/m².     Physical  Exam  Constitutional:       General: She is awake. She is not in acute distress.     Appearance: Normal appearance. She is well-developed. She is not toxic-appearing or diaphoretic.   HENT:      Head: Normocephalic and atraumatic.   Eyes:      General: Lids are normal.      Conjunctiva/sclera: Conjunctivae normal.      Pupils: Pupils are equal, round, and reactive to light.   Neck:      Thyroid: No thyroid mass or thyromegaly.      Vascular: Normal carotid pulses. No JVD.      Trachea: Trachea normal. No tracheal deviation.   Cardiovascular:      Rate and Rhythm: Normal rate and regular rhythm.      Pulses: Normal pulses.      Heart sounds: Normal heart sounds, S1 normal and S2 normal.   Pulmonary:      Effort: Pulmonary effort is normal.      Breath sounds: Normal breath sounds. No stridor.   Abdominal:      General: Bowel sounds are normal.      Palpations: Abdomen is soft.      Tenderness: There is no abdominal tenderness.   Musculoskeletal:         General: Normal range of motion.      Cervical back: Full passive range of motion without pain, normal range of motion and neck supple.   Skin:     General: Skin is warm and dry.      Findings: Abscess (perianal abscess) present.      Nails: There is no clubbing.   Neurological:      Mental Status: She is alert and oriented to person, place, and time.      Cranial Nerves: No cranial nerve deficit.      Sensory: No sensory deficit.   Psychiatric:         Speech: Speech normal.         Behavior: Behavior normal. Behavior is cooperative.         Thought Content: Thought content normal.         Judgment: Judgment normal.            I have reviewed all pertinent lab results within the past 24 hours.  CBC:   Recent Labs   Lab 08/29/23  0537   WBC 12.95*   RBC 4.15   HGB 9.0*   HCT 29.6*      MCV 71*   MCH 21.7*   MCHC 30.4*     BMP:   Recent Labs   Lab 08/29/23  0537   GLU 99      K 3.5      CO2 28   BUN 12   CREATININE 0.8   CALCIUM 8.9   MG 1.8        Significant Diagnostics:  I have reviewed all pertinent imaging results/findings within the past 24 hours.

## 2023-08-29 NOTE — H&P
[facility] - Emergency Dept  Hospital Medicine  History & Physical    Patient Name: Agnes Matos  MRN: [id number]  Patient Class: OP- Observation  Admission Date: 8/28/2023  Attending Physician: Sheila Trevizo MD   Primary Care Provider: Rose Torres MD         Patient information was obtained from patient and ER records.     Subjective:     Principal Problem:Perianal abscess    Chief Complaint:   Chief Complaint   Patient presents with    Abscess     Rectum        HPI: Agnes Matos is a 28 y.o. female with a history as  has a past medical history of Anxiety, Cholestasis during pregnancy, Crohn's colitis, Depression, Endometriosis, GERD (gastroesophageal reflux disease), IBS (irritable bowel syndrome), and TMJ (dislocation of temporomandibular joint). who presented to the ED with a Abscess (Rectum)    Patient presents to the emergency room for evaluation of perianal abscess.  Patient reports she was seen pellet can not urgent care for anal abscess that was draining blood and pus.  At urgent care she was noted to have a temp of a 102° provider reported he could not excise the abscess and recommended that she go to the emergency room given her elevated temp.     Lab and imaging obtained and reviewed.  CBC completed and shows WBCs 16.27 H/H 9.7/30.7 MCV 70 MCH 21.9 MCHC 31.6 RDW is 19.3 platelets 457 MPV 8.7 g% 81.2 lymphocytes 13.2.  Chemistry profile shows sodium 134 alk-phos 142 AST/ALT 51/62.  Lactic acid within normal range.  UA with hazy color 3+ leukocytes greater than 100 WBCs 9 hyaline casts.  On admit, temp 99.9° with T-max of 102°, , RR 20, /73, O2 sats 96% on room air.       CT abdomen/pelvis  IMPRESSION:  1.  Multi loculated fluid collection with peripheral enhancement containing gas in the left pelvis measuring approximately 9.1 x 6.7 cm which closely abuts the left ovary and left uterine fundus and probably arises from the left ovary. This left pelvis/adnexal complex fluid  collection closely abuts the sigmoid with focal area of wall thickening of the sigmoid. Overall findings are concerning for tubo-ovarian abscess with involvement of the sigmoid. Fistula formation is difficult to exclude. Alternatively, differential diagnosis includes a superinfected necrotic ovarian or uterine mass. Recommend surgery consultation.  2.  Hepatomegaly. Splenomegaly.  3.  Hypodense lesion measuring 1.1 cm in the posterior right hepatic lobe appears unchanged from 2023 possibly a hemangioma. Consider follow-up MRI.  4.  Bilateral nephrolithiasis without hydronephrosis.    Per ED provider patient with perianal abscess.  On admit fever resolved. Noted to have elevated white count. CT abdomen pelvis was completed (as above).  General surgery was consulted with recommendations to consult OB/GYN given CT results and initiate IV ABX for possible intervention.     Patient reports she recently had a baby in 2023 and was aware of the mass represented in the CT findings and states it was supposed to be removed during the time of delivery however it was embedded into her uterus so procedure was postponed.  She further states she was seen at [facility] and was schedule to have mass removed 2023, but, was told that the mass was seen on their imaging. Patient is aware of the mass, however, wasn't aware that it was necrotic.         Past Medical History:   Diagnosis Date    Anxiety     Cholestasis during pregnancy     Crohn's colitis     Depression     Endometriosis     GERD (gastroesophageal reflux disease)     IBS (irritable bowel syndrome)     TMJ (dislocation of temporomandibular joint)        Past Surgical History:   Procedure Laterality Date     SECTION  17 &     Two births     SECTION N/A 2023    Procedure:  SECTION;  Surgeon: Lisha Mcdonough MD;  Location: [facility] L&D;  Service: OB/GYN;  Laterality: N/A;    COLON SURGERY  21    A small part  along with tbe small bowel    COLONOSCOPY N/A 12/22/2022    Procedure: COLONOSCOPY;  Surgeon: Gregorio Bourne MD;  Location: [facility] ENDO;  Service: Gastroenterology;  Laterality: N/A;    CYSTOSCOPY W/ URETERAL STENT PLACEMENT Right 2/10/2023    Procedure: CYSTOSCOPY, WITH URETERAL STENT INSERTION;  Surgeon: Marry Erickson MD;  Location: [facility] OR;  Service: Urology;  Laterality: Right;    DIAGNOSTIC LAPAROSCOPY Left 12/20/2022    Procedure: LAPAROSCOPY, DIAGNOSTIC- SAMPLING OF PERITONEAL FLUID;  Surgeon: Yajaira Siegel MD;  Location: [facility] OR;  Service: OB/GYN;  Laterality: Left;    presacral neurectomy      2020    SMALL INTESTINE SURGERY  09/21/2021    partial with partial large bowel    SURGICAL REMOVAL OF ENDOMETRIOSIS      TONSILLECTOMY  2006    TUBAL LIGATION  10-26-19    After c-secrtion    URETEROSCOPIC REMOVAL OF URETERIC CALCULUS Right 2/10/2023    Procedure: REMOVAL, CALCULUS, URETER, URETEROSCOPIC;  Surgeon: Marry Erickson MD;  Location: [facility] OR;  Service: Urology;  Laterality: Right;       Review of patient's allergies indicates:   Allergen Reactions    Methotrexate Swelling     Hands face      Stelara [ustekinumab] Hives    Vedolizumab Hives    Adalimumab Rash, Itching and Hives     joint ache      Renflexis [infliximab-abda] Rash     Other reaction(s): Rash or Itch, Other: joint ache, Rash or Itch, Other: joint ache       No current facility-administered medications on file prior to encounter.     Current Outpatient Medications on File Prior to Encounter   Medication Sig    oxyCODONE-acetaminophen (PERCOCET) 5-325 mg per tablet Take 1 tablet by mouth every 6 (six) hours as needed for Pain. (Patient not taking: Reported on 8/28/2023)     Family History    None       Tobacco Use    Smoking status: Never    Smokeless tobacco: Never   Substance and Sexual Activity    Alcohol use: Not Currently     Comment: Social once every six months    Drug use: Never    Sexual activity: Yes      Partners: Male     Birth control/protection: See Surgical Hx     Comment: Tubal ligation     Review of Systems   Constitutional:  Positive for fever. Negative for chills and diaphoresis.   HENT:  Negative for congestion, postnasal drip, sinus pressure and sore throat.    Eyes:  Negative for visual disturbance.   Respiratory:  Negative for cough, chest tightness, shortness of breath and wheezing.    Cardiovascular:  Negative for chest pain, palpitations and leg swelling.   Gastrointestinal:  Negative for abdominal distention, abdominal pain, blood in stool, constipation, diarrhea, nausea and vomiting.   Endocrine: Negative.    Genitourinary:  Negative for dysuria.   Musculoskeletal: Negative.    Skin:  Positive for wound (perianal abscess).   Allergic/Immunologic: Negative.    Neurological:  Negative for dizziness, weakness, numbness and headaches.   Hematological: Negative.    Psychiatric/Behavioral: Negative.       Objective:     Vital Signs (Most Recent):  Temp: 99.9 °F (37.7 °C) (08/28/23 1651)  Pulse: (!) 130 (08/28/23 1651)  Resp: 16 (08/29/23 0029)  BP: 123/73 (08/28/23 1651)  SpO2: 96 % (08/28/23 1651) Vital Signs (24h Range):  Temp:  [99.9 °F (37.7 °C)-102 °F (38.9 °C)] 99.9 °F (37.7 °C)  Pulse:  [107-130] 130  Resp:  [16-20] 16  SpO2:  [96 %-97 %] 96 %  BP: (102-123)/(63-73) 123/73     Weight: 77.6 kg (171 lb)  Body mass index is 30.29 kg/m².     Physical Exam  Constitutional:       General: She is awake. She is not in acute distress.     Appearance: Normal appearance. She is well-developed. She is not toxic-appearing or diaphoretic.   HENT:      Head: Normocephalic and atraumatic.   Eyes:      General: Lids are normal.      Conjunctiva/sclera: Conjunctivae normal.      Pupils: Pupils are equal, round, and reactive to light.   Neck:      Thyroid: No thyroid mass or thyromegaly.      Vascular: Normal carotid pulses. No JVD.      Trachea: Trachea normal. No tracheal deviation.   Cardiovascular:      Rate  and Rhythm: Normal rate and regular rhythm.      Pulses: Normal pulses.      Heart sounds: Normal heart sounds, S1 normal and S2 normal.   Pulmonary:      Effort: Pulmonary effort is normal.      Breath sounds: Normal breath sounds. No stridor.   Abdominal:      General: Bowel sounds are normal.      Palpations: Abdomen is soft.      Tenderness: There is no abdominal tenderness.   Musculoskeletal:         General: Normal range of motion.      Cervical back: Full passive range of motion without pain, normal range of motion and neck supple.   Skin:     General: Skin is warm and dry.      Findings: Abscess (perianal abscess) present.      Nails: There is no clubbing.   Neurological:      Mental Status: She is alert and oriented to person, place, and time.      Cranial Nerves: No cranial nerve deficit.      Sensory: No sensory deficit.   Psychiatric:         Speech: Speech normal.         Behavior: Behavior normal. Behavior is cooperative.         Thought Content: Thought content normal.         Judgment: Judgment normal.              CRANIAL NERVES     CN III, IV, VI   Pupils are equal, round, and reactive to light.       Significant Labs: All pertinent labs within the past 24 hours have been reviewed.  Bilirubin:   Recent Labs   Lab 08/28/23  1733   BILITOT 0.9     BMP:   Recent Labs   Lab 08/28/23  1733   GLU 89   *   K 3.7   CL 99   CO2 24   BUN 14   CREATININE 0.7   CALCIUM 9.1     CBC:   Recent Labs   Lab 08/28/23  1733   WBC 16.27*   HGB 9.7*   HCT 30.7*   *     CMP:   Recent Labs   Lab 08/28/23  1733   *   K 3.7   CL 99   CO2 24   GLU 89   BUN 14   CREATININE 0.7   CALCIUM 9.1   PROT 8.0   ALBUMIN 3.8   BILITOT 0.9   ALKPHOS 142*   AST 51*   ALT 62*   ANIONGAP 11     Lactic Acid:   Recent Labs   Lab 08/28/23  1733   LACTATE 0.8     Urine Studies:   Recent Labs   Lab 08/28/23  1659   COLORU Yellow   APPEARANCEUA Hazy*   PHUR 6.0   SPECGRAV 1.020   PROTEINUA Negative   GLUCUA Negative   KETONESU  Negative   BILIRUBINUA Negative   OCCULTUA Negative   NITRITE Negative   UROBILINOGEN Negative   LEUKOCYTESUR 3+*   RBCUA 2   WBCUA >100*   BACTERIA Rare   SQUAMEPITHEL 4   HYALINECASTS 9*       Significant Imaging: I have reviewed all pertinent imaging results/findings within the past 24 hours.  CT Abdomen Pelvis With Contrast    Result Date: 8/29/2023  CT ABDOMEN PELVIS WITH IV CONTRAST INDICATION: 28 years Female; rectal abscess TECHNIQUE:  CT scan of the abdomen and pelvis was performed with IV contrast.  This exam was performed according to our departmental dose-optimization program, which includes automated exposure control, adjustment of the mA and/or kV according to patient size and/or use of iterative reconstruction technique. Unless otherwise specified, incidental findings do not require dedicated imaging follow-up. Comparison: 1/29/2023. FINDINGS: Liver: The liver is enlarged measuring 23 cm in maximum craniocaudal dimension. Hypodense lesion measuring 1.1 cm in the posterior right hepatic lobe appears unchanged from 1/29/2023 possibly a hemangioma. Consider follow-up MRI. Gallbladder/Biliary tree: Status post cholecystectomy. No significant biliary dilatation. Pancreas: Unremarkable. Spleen: The spleen is enlarged measuring 13.8 cm in maximum craniocaudal dimension. Adrenals: Unremarkable. Genitourinary: 2 mm nonobstructing stone in the inferior right kidney. 3 mm nonobstructing stone in the mid left kidney. No hydronephrosis. No ureteral stones. No bladder stones or wall thickening. Multi loculated fluid collection with peripheral enhancement containing gas in the left pelvis measuring approximately 9.1 x 6.7 cm which closely abuts the left ovary and left uterine fundus probably arises from the left ovary and has significantly increased in size from 1/29/2023. This left pelvis/adnexal complex fluid collection closely abuts the sigmoid with focal areas of wall thickening of the sigmoid. Gastrointestinal:  Status post right hemicolectomy.  Status post appendectomy. No bowel obstruction. No gastric wall thickening. No free air. Peritoneum: Small amount of free fluid in the pelvis. Vasculature: Unremarkable. Lymph nodes: No pathologically enlarged lymph nodes. Bones: Unremarkable. Soft tissues: Unremarkable. Incidental findings: None. IMPRESSION: 1.  Multi loculated fluid collection with peripheral enhancement containing gas in the left pelvis measuring approximately 9.1 x 6.7 cm which closely abuts the left ovary and left uterine fundus and probably arises from the left ovary. This left pelvis/adnexal complex fluid collection closely abuts the sigmoid with focal area of wall thickening of the sigmoid. Overall findings are concerning for tubo-ovarian abscess with involvement of the sigmoid. Fistula formation is difficult to exclude. Alternatively, differential diagnosis includes a superinfected necrotic ovarian or uterine mass. Recommend surgery consultation. 2.  Hepatomegaly. Splenomegaly. 3.  Hypodense lesion measuring 1.1 cm in the posterior right hepatic lobe appears unchanged from 1/29/2023 possibly a hemangioma. Consider follow-up MRI. 4.  Bilateral nephrolithiasis without hydronephrosis. Electronically signed by:  Aileen Martinez  8/29/2023 12:23 AM CDT Workstation: [id number]       Assessment/Plan:     Active Hospital Problems    Diagnosis  POA    *Perianal abscess [K61.0]  Yes     Priority: 1 - High    Tubo-ovarian abscess [N70.93]  Yes    Uterine mass [N85.8]  Yes    Crohn's disease, unspecified, without complications [K50.90]  Yes    Endometriosis, unspecified [N80.9]  Yes      Resolved Hospital Problems   No resolved problems to display.     Plan:  Admit to observation - Perianal abscess/Tubo-ovarian abscess/uterian mass  Continue IV antibiotics - deescalate/modified pending culture results  Gentle IVF hydration  Antipyretics/antemetics/pain control   NPO  General surgery consulted with plans for possible  intervention in a.m.  Ob/gyn consulted per general surgery request - CT findings of infected necrotic ovarian or uterine mass  Daily labs  Electrolytes sliding scale repletion  Further plan as per hospital course    VTE Risk Mitigation (From admission, onward)         Ordered     Place CRISTHIAN hose  Until discontinued         08/29/23 0124     IP VTE HIGH RISK PATIENT  Once         08/29/23 0124     Place sequential compression device  Until discontinued         08/29/23 0124                     On 08/29/2023, patient should be placed in hospital observation services under my care in collaboration with Dr. Trevizo.      KIARA Andrade  Department of Hospital Medicine  Formerly [facility] - Emergency Dept

## 2023-08-29 NOTE — PLAN OF CARE
Patient taken to room via bed at this time. Awake and alert not distressful. GUY Hall at bedside to assume care of patient.

## 2023-08-29 NOTE — CONSULTS
Patient is a 28-year-old white female well known to me with recent  delivery performed by Dr. Mcdonough.  Patient has a known history of multiple abdominal surgeries including surgery during her pregnancy for peritonitis.  Patient had an enlarged ovarian cyst which was abutting the uterus with dense bowel adhesions incorporated into this complex mass at the time of her surgery so the decision was to close her and refer her to a higher level of care post delivery.  This has been set up with gyn Oncology Dr. Zazueta at [facility] with a scheduled surgery of 2023.  I will let her gyn oncologist no that the patient is in the hospital for kanu rectal abscess which is scheduled for drainage later on today.  CT scan reviewed with no significant changes from what was seen at the time of delivery.    IMPRESSION:  1.  Multi loculated fluid collection with peripheral enhancement containing gas in the left pelvis measuring approximately 9.1 x 6.7 cm which closely abuts the left ovary and left uterine fundus and probably arises from the left ovary. This left pelvis/adnexal complex fluid collection closely abuts the sigmoid with focal area of wall thickening of the sigmoid. Overall findings are concerning for tubo-ovarian abscess with involvement of the sigmoid. Fistula formation is difficult to exclude. Alternatively, differential diagnosis includes a superinfected necrotic ovarian or uterine mass. Recommend surgery consultation.nificant changes from what was seen at the time of delivery    Thank you for the consult no further gyn action needed during this hospitalization.

## 2023-08-29 NOTE — CONSULTS
[facility]  General Surgery  Consult Note    Patient Name: Agnes Matos  MRN: [id number]  Code Status: Full Code  Admission Date: 2023  Hospital Length of Stay: 0 days  Attending Physician: Daily Ibrahim MD  Primary Care Provider: Rose Torres MD    Patient information was obtained from patient and ER records.     Inpatient consult to General surgery  Consult performed by: Darleen Pope MD  Consult ordered by: Vahe Singleton FNP  Reason for consult: abscess  Assessment/Recommendations: I and d        Subjective:     Principal Problem: Perianal abscess    History of Present Illness: 29 y/o with hx of crohns presents with pain and swelling to kanu anal area.  Hx of ovarian cyst/mass, seeing surgical gyn oncology at [facility]. Dr. Zazueta and was planning for resection in 1-2 weeks.  She reports fever up to 102 and complains of some abdominal pain at my interview.      No current facility-administered medications on file prior to encounter.     Current Outpatient Medications on File Prior to Encounter   Medication Sig    oxyCODONE-acetaminophen (PERCOCET) 5-325 mg per tablet Take 1 tablet by mouth every 6 (six) hours as needed for Pain. (Patient not taking: Reported on 2023)       Review of patient's allergies indicates:   Allergen Reactions    Methotrexate Swelling     Hands face      Stelara [ustekinumab] Hives    Vedolizumab Hives    Adalimumab Rash, Itching and Hives     joint ache      Renflexis [infliximab-abda] Rash     Other reaction(s): Rash or Itch, Other: joint ache, Rash or Itch, Other: joint ache       Past Medical History:   Diagnosis Date    Anxiety     Cholestasis during pregnancy     Crohn's colitis     Depression     Endometriosis     GERD (gastroesophageal reflux disease)     IBS (irritable bowel syndrome)     TMJ (dislocation of temporomandibular joint)      Past Surgical History:   Procedure Laterality Date     SECTION  17 &      Two births     SECTION N/A 2023    Procedure:  SECTION;  Surgeon: Lisha Mcdonough MD;  Location: [facility] L&D;  Service: OB/GYN;  Laterality: N/A;    COLON SURGERY  21    A small part along with tbe small bowel    COLONOSCOPY N/A 2022    Procedure: COLONOSCOPY;  Surgeon: Gregorio Bourne MD;  Location: [facility];  Service: Gastroenterology;  Laterality: N/A;    CYSTOSCOPY W/ URETERAL STENT PLACEMENT Right 2/10/2023    Procedure: CYSTOSCOPY, WITH URETERAL STENT INSERTION;  Surgeon: Marry Erickson MD;  Location: [facility] OR;  Service: Urology;  Laterality: Right;    DIAGNOSTIC LAPAROSCOPY Left 2022    Procedure: LAPAROSCOPY, DIAGNOSTIC- SAMPLING OF PERITONEAL FLUID;  Surgeon: Yajaira Siegel MD;  Location: [facility];  Service: OB/GYN;  Laterality: Left;    presacral neurectomy          SMALL INTESTINE SURGERY  2021    partial with partial large bowel    SURGICAL REMOVAL OF ENDOMETRIOSIS      TONSILLECTOMY  2006    TUBAL LIGATION  10-26-19    After c-secrtion    URETEROSCOPIC REMOVAL OF URETERIC CALCULUS Right 2/10/2023    Procedure: REMOVAL, CALCULUS, URETER, URETEROSCOPIC;  Surgeon: Marry Erickson MD;  Location: [facility];  Service: Urology;  Laterality: Right;     Family History    None       Tobacco Use    Smoking status: Never    Smokeless tobacco: Never   Substance and Sexual Activity    Alcohol use: Not Currently     Comment: Social once every six months    Drug use: Never    Sexual activity: Yes     Partners: Male     Birth control/protection: See Surgical Hx     Comment: Tubal ligation     Review of Systems   Constitutional:  Positive for fever. Negative for chills and diaphoresis.   HENT:  Negative for congestion, postnasal drip, sinus pressure and sore throat.    Eyes:  Negative for visual disturbance.   Respiratory:  Negative for cough, chest tightness, shortness of breath and wheezing.    Cardiovascular:  Negative for chest pain,  palpitations and leg swelling.   Gastrointestinal:  Negative for abdominal distention, abdominal pain, blood in stool, constipation, diarrhea, nausea and vomiting.   Endocrine: Negative.    Genitourinary:  Negative for dysuria.   Musculoskeletal: Negative.    Skin:  Positive for wound (perianal abscess).   Allergic/Immunologic: Negative.    Neurological:  Negative for dizziness, weakness, numbness and headaches.   Hematological: Negative.    Psychiatric/Behavioral: Negative.       Objective:     Vital Signs (Most Recent):  Temp: 98 °F (36.7 °C) (08/29/23 1128)  Pulse: 87 (08/29/23 1128)  Resp: 18 (08/29/23 1128)  BP: (!) 113/15 (08/29/23 1128)  SpO2: 98 % (08/29/23 1128) Vital Signs (24h Range):  Temp:  [97.2 °F (36.2 °C)-102 °F (38.9 °C)] 98 °F (36.7 °C)  Pulse:  [] 87  Resp:  [16-20] 18  SpO2:  [96 %-100 %] 98 %  BP: ()/(15-73) 113/15     Weight: 76.7 kg (169 lb 1.5 oz)  Body mass index is 29.95 kg/m².     Physical Exam  Constitutional:       General: She is awake. She is not in acute distress.     Appearance: Normal appearance. She is well-developed. She is not toxic-appearing or diaphoretic.   HENT:      Head: Normocephalic and atraumatic.   Eyes:      General: Lids are normal.      Conjunctiva/sclera: Conjunctivae normal.      Pupils: Pupils are equal, round, and reactive to light.   Neck:      Thyroid: No thyroid mass or thyromegaly.      Vascular: Normal carotid pulses. No JVD.      Trachea: Trachea normal. No tracheal deviation.   Cardiovascular:      Rate and Rhythm: Normal rate and regular rhythm.      Pulses: Normal pulses.      Heart sounds: Normal heart sounds, S1 normal and S2 normal.   Pulmonary:      Effort: Pulmonary effort is normal.      Breath sounds: Normal breath sounds. No stridor.   Abdominal:      General: Bowel sounds are normal.      Palpations: Abdomen is soft.      Tenderness: There is no abdominal tenderness.   Musculoskeletal:         General: Normal range of motion.       Cervical back: Full passive range of motion without pain, normal range of motion and neck supple.   Skin:     General: Skin is warm and dry.      Findings: Abscess (perianal abscess) present.      Nails: There is no clubbing.   Neurological:      Mental Status: She is alert and oriented to person, place, and time.      Cranial Nerves: No cranial nerve deficit.      Sensory: No sensory deficit.   Psychiatric:         Speech: Speech normal.         Behavior: Behavior normal. Behavior is cooperative.         Thought Content: Thought content normal.         Judgment: Judgment normal.            I have reviewed all pertinent lab results within the past 24 hours.  CBC:   Recent Labs   Lab 08/29/23 0537   WBC 12.95*   RBC 4.15   HGB 9.0*   HCT 29.6*      MCV 71*   MCH 21.7*   MCHC 30.4*     BMP:   Recent Labs   Lab 08/29/23 0537   GLU 99      K 3.5      CO2 28   BUN 12   CREATININE 0.8   CALCIUM 8.9   MG 1.8       Significant Diagnostics:  I have reviewed all pertinent imaging results/findings within the past 24 hours.      Assessment/Plan:     * Perianal abscess  Incision and drainage of abscess in operating room today.  Antibiotics.  Suspect this is the cause of fevers.  I do worry that the intra-abdominal fluid collection may also be the cause of fevers.  This appears to be within the ovary and will be much more difficult to drain.  We will proceed with the I and D of the abscess 1st and then monitor her clinical response.    I have spoken with multiple physicians about this case- gyn, rads      VTE Risk Mitigation (From admission, onward)         Ordered     Place CRISTHIAN hose  Until discontinued         08/29/23 0124     IP VTE HIGH RISK PATIENT  Once         08/29/23 0124     Place sequential compression device  Until discontinued         08/29/23 0124                Thank you for your consult. I will follow-up with patient. Please contact us if you have any additional questions.    Darleen Pope,  MD  General Surgery  [facility]

## 2023-08-29 NOTE — PLAN OF CARE
"[facility]  Initial Discharge Assessment       Primary Care Provider: Rose Torres MD    Admission Diagnosis: Perirectal abscess [K61.1]  Tubo-ovarian abscess [N70.93]    Admission Date: 8/28/2023  Expected Discharge Date: 8/30/2023  Spoke with Aniceto Matos/spouse [phone] via phone to complete assessment. Patient was in IED. No advance directive, living will or POA. No HH, HD, DME or Coumadin. Patient's  will bring her home when she is discharged. No identified needs at this time.    Transition of Care Barriers: None    Payor: [employer] / Plan: [place] [employer] / Product Type: Government /     Extended Emergency Contact Information  Primary Emergency Contact: Mark Matos "ANICETO"  Address: [id number] [place] [address] [facility]  Home Phone: [phone]  Mobile Phone: [phone]  Relation: Spouse  Preferred language: English   needed? No    Discharge Plan A: Home with family  Discharge Plan B: Home with family      [id number] - [place], [place] - [address] [facility]  [address]  [facility] [address]  Phone: [phone] Fax: [phone]    [facility] [address] [facility]  [address] [facility] [address]  Phone: [phone] Fax: [phone]    [id number] [address]  AT [facility]  [address] [facility] [address]  Phone: [phone] Fax: [phone]      Initial Assessment (most recent)       Adult Discharge Assessment - 08/29/23 1500          Discharge Assessment    Assessment Type Discharge Planning Assessment     Confirmed/corrected address, phone number and insurance Yes     Confirmed Demographics Correct on Facesheet     Source of Information family     When was your last doctors appointment? --   2-3 months ago    Does patient/caregiver " understand observation status Yes     Communicated KATHLEEN with patient/caregiver Date not available/Unable to determine     Reason For Admission perianal abscess     People in Home spouse     Facility Arrived From: home     Do you expect to return to your current living situation? Yes     Do you have help at home or someone to help you manage your care at home? Yes     Who are your caregiver(s) and their phone number(s)? Kevin Matos/spouse [phone]     Prior to hospitilization cognitive status: Alert/Oriented;No Deficits     Current cognitive status: Alert/Oriented;No Deficits     Equipment Currently Used at Home none     Readmission within 30 days? No     Patient currently being followed by outpatient case management? No     Do you currently have service(s) that help you manage your care at home? No     Do you take prescription medications? Yes     Do you have prescription coverage? Yes     Coverage [place],      Do you have any problems affording any of your prescribed medications? No     Is the patient taking medications as prescribed? yes     Who is going to help you get home at discharge? Kevin Matos/spouse [phone]     How do you get to doctors appointments? car, drives self     Do you take coumadin? No     DME Needed Upon Discharge  none     Discharge Plan discussed with: Spouse/sig other     Name(s) and Number(s) Kevin Matos/spouse [phone]     Transition of Care Barriers None     Discharge Plan A Home with family     Discharge Plan B Home with family        OTHER    Name(s) of People in Home Kevin Mtaos/spouse [phone]

## 2023-08-29 NOTE — HPI
Agnes Matos is a 28 y.o. female with a history as  has a past medical history of Anxiety, Cholestasis during pregnancy, Crohn's colitis, Depression, Endometriosis, GERD (gastroesophageal reflux disease), IBS (irritable bowel syndrome), and TMJ (dislocation of temporomandibular joint). who presented to the ED with a Abscess (Rectum)    Patient presents to the emergency room for evaluation of perianal abscess.  Patient reports she was seen pellet can not urgent care for anal abscess that was draining blood and pus.  At urgent care she was noted to have a temp of a 102° provider reported he could not excise the abscess and recommended that she go to the emergency room given her elevated temp.     Lab and imaging obtained and reviewed.  CBC completed and shows WBCs 16.27 H/H 9.7/30.7 MCV 70 MCH 21.9 MCHC 31.6 RDW is 19.3 platelets 457 MPV 8.7 g% 81.2 lymphocytes 13.2.  Chemistry profile shows sodium 134 alk-phos 142 AST/ALT 51/62.  Lactic acid within normal range.  UA with hazy color 3+ leukocytes greater than 100 WBCs 9 hyaline casts.  On admit, temp 99.9° with T-max of 102°, , RR 20, /73, O2 sats 96% on room air.       CT abdomen/pelvis  IMPRESSION:  1.  Multi loculated fluid collection with peripheral enhancement containing gas in the left pelvis measuring approximately 9.1 x 6.7 cm which closely abuts the left ovary and left uterine fundus and probably arises from the left ovary. This left pelvis/adnexal complex fluid collection closely abuts the sigmoid with focal area of wall thickening of the sigmoid. Overall findings are concerning for tubo-ovarian abscess with involvement of the sigmoid. Fistula formation is difficult to exclude. Alternatively, differential diagnosis includes a superinfected necrotic ovarian or uterine mass. Recommend surgery consultation.  2.  Hepatomegaly. Splenomegaly.  3.  Hypodense lesion measuring 1.1 cm in the posterior right hepatic lobe appears unchanged from 1/29/2023  possibly a hemangioma. Consider follow-up MRI.  4.  Bilateral nephrolithiasis without hydronephrosis.    Per ED provider patient with perianal abscess.  On admit fever resolved. Noted to have elevated white count. CT abdomen pelvis was completed (as above).  General surgery was consulted with recommendations to consult OB/GYN given CT results and initiate IV ABX for possible intervention.     Patient reports she recently had a baby in June 2023 and was aware of the mass represented in the CT findings and states it was supposed to be removed during the time of delivery however it was embedded into her uterus so procedure was postponed.  She further states she was seen at [facility] and was schedule to have mass removed 09/07/2023, but, was told that the mass was seen on their imaging. Patient is aware of the mass, however, wasn't aware that it was necrotic.

## 2023-08-29 NOTE — ED PROVIDER NOTES
Encounter Date: 2023       History     Chief Complaint   Patient presents with    Abscess     Rectum     Chief complaint is perianal pain.  The patient noted these symptoms for the last several days.  She was seen at urgent care today and because of the location of the abscess near her anus she was sent here for evaluation.  She had 102 fever as well and today has 15,000 white count heart rate of 130.  She is awake alert cooperative.  Does have history of Crohn's disease that is fairly stable.  No previous problems with perianal abscesses like this.        Review of patient's allergies indicates:   Allergen Reactions    Methotrexate Swelling     Hands face      Stelara [ustekinumab] Hives    Vedolizumab Hives    Adalimumab Rash, Itching and Hives     joint ache      Renflexis [infliximab-abda] Rash     Other reaction(s): Rash or Itch, Other: joint ache, Rash or Itch, Other: joint ache     Past Medical History:   Diagnosis Date    Anxiety     Cholestasis during pregnancy     Crohn's colitis     Depression     Endometriosis     GERD (gastroesophageal reflux disease)     IBS (irritable bowel syndrome)     TMJ (dislocation of temporomandibular joint)      Past Surgical History:   Procedure Laterality Date     SECTION  17 &     Two births     SECTION N/A 2023    Procedure:  SECTION;  Surgeon: Lisha Mcdonough MD;  Location: [facility] L&D;  Service: OB/GYN;  Laterality: N/A;    COLON SURGERY  21    A small part along with tbe small bowel    COLONOSCOPY N/A 2022    Procedure: COLONOSCOPY;  Surgeon: Gregorio Bourne MD;  Location: [facility];  Service: Gastroenterology;  Laterality: N/A;    CYSTOSCOPY W/ URETERAL STENT PLACEMENT Right 2/10/2023    Procedure: CYSTOSCOPY, WITH URETERAL STENT INSERTION;  Surgeon: Marry Erickson MD;  Location: [facility] OR;  Service: Urology;  Laterality: Right;    DIAGNOSTIC LAPAROSCOPY Left 2022    Procedure: LAPAROSCOPY, DIAGNOSTIC-  SAMPLING OF PERITONEAL FLUID;  Surgeon: Yajaira Siegel MD;  Location: [facility] OR;  Service: OB/GYN;  Laterality: Left;    presacral neurectomy      2020    SMALL INTESTINE SURGERY  09/21/2021    partial with partial large bowel    SURGICAL REMOVAL OF ENDOMETRIOSIS      TONSILLECTOMY  2006    TUBAL LIGATION  10-26-19    After c-secrtion    URETEROSCOPIC REMOVAL OF URETERIC CALCULUS Right 2/10/2023    Procedure: REMOVAL, CALCULUS, URETER, URETEROSCOPIC;  Surgeon: Marry Erickson MD;  Location: [facility] OR;  Service: Urology;  Laterality: Right;     No family history on file.  Social History     Tobacco Use    Smoking status: Never    Smokeless tobacco: Never   Substance Use Topics    Alcohol use: Not Currently     Comment: Social once every six months    Drug use: Never     Review of Systems   Constitutional:  Negative for chills and fever.   HENT:  Negative for ear pain, rhinorrhea and sore throat.    Eyes:  Negative for pain and visual disturbance.   Respiratory:  Negative for cough and shortness of breath.    Cardiovascular:  Negative for chest pain and palpitations.   Gastrointestinal:  Negative for abdominal pain, constipation, diarrhea, nausea and vomiting.   Genitourinary:  Negative for dysuria, frequency, hematuria and urgency.   Musculoskeletal:  Negative for back pain, joint swelling and myalgias.   Skin:  Negative for rash.        Perianal pain   Neurological:  Negative for dizziness, seizures, weakness and headaches.   Psychiatric/Behavioral:  Negative for dysphoric mood. The patient is not nervous/anxious.        Physical Exam     Initial Vitals [08/28/23 1651]   BP Pulse Resp Temp SpO2   123/73 (!) 130 20 99.9 °F (37.7 °C) 96 %      MAP       --         Physical Exam    Nursing note and vitals reviewed.  Constitutional: She appears well-developed and well-nourished.   HENT:   Head: Normocephalic and atraumatic.   Eyes: Conjunctivae, EOM and lids are normal. Pupils are equal, round, and reactive to light.    Neck: Trachea normal. Neck supple. No thyroid mass and no thyromegaly present.   Normal range of motion.  Cardiovascular:  Normal rate, regular rhythm and normal heart sounds.           Pulmonary/Chest: Effort normal and breath sounds normal.   Abdominal: Abdomen is soft. There is no abdominal tenderness.   Musculoskeletal:         General: Normal range of motion.      Cervical back: Normal range of motion and neck supple.     Neurological: She is alert and oriented to person, place, and time. She has normal strength and normal reflexes. No cranial nerve deficit or sensory deficit.   Skin: Skin is warm and dry.   Patient with definite abscess with fluctuance to the perianal area on the patient's left side 9 oclock position   Psychiatric: She has a normal mood and affect. Her speech is normal and behavior is normal. Judgment and thought content normal.         ED Course   Procedures  Labs Reviewed   CBC W/ AUTO DIFFERENTIAL - Abnormal; Notable for the following components:       Result Value    WBC 16.27 (*)     Hemoglobin 9.7 (*)     Hematocrit 30.7 (*)     MCV 70 (*)     MCH 21.9 (*)     MCHC 31.6 (*)     RDW 19.3 (*)     Platelets 457 (*)     MPV 8.7 (*)     Gran # (ANC) 13.2 (*)     Immature Grans (Abs) 0.08 (*)     Gran % 81.2 (*)     Lymph % 13.0 (*)     All other components within normal limits   COMPREHENSIVE METABOLIC PANEL - Abnormal; Notable for the following components:    Sodium 134 (*)     Alkaline Phosphatase 142 (*)     AST 51 (*)     ALT 62 (*)     All other components within normal limits   URINALYSIS, REFLEX TO URINE CULTURE - Abnormal; Notable for the following components:    Appearance, UA Hazy (*)     Leukocytes, UA 3+ (*)     All other components within normal limits    Narrative:     Specimen Source->Urine   URINALYSIS MICROSCOPIC - Abnormal; Notable for the following components:    WBC, UA >100 (*)     Hyaline Casts, UA 9 (*)     All other components within normal limits    Narrative:      Specimen Source->Urine   CULTURE, BLOOD   CULTURE, BLOOD   CULTURE, URINE   MRSA SCREEN BY PCR   LACTIC ACID, PLASMA   POCT URINE PREGNANCY   ISTAT CREATININE   POCT CREATININE          Imaging Results              CT Abdomen Pelvis With Contrast (Final result)  Result time 08/29/23 00:23:11      Final result by Aileen Martinez MD (08/29/23 00:23:11)                   Narrative:    CT ABDOMEN PELVIS WITH IV CONTRAST    INDICATION: 28 years Female; rectal abscess    TECHNIQUE:  CT scan of the abdomen and pelvis was performed with IV contrast.  This exam was performed according to our departmental dose-optimization program, which includes automated exposure control, adjustment of the mA and/or kV according to patient size and/or use of iterative reconstruction technique. Unless otherwise specified, incidental findings do not require dedicated imaging follow-up.    Comparison: 1/29/2023.    FINDINGS:  Liver: The liver is enlarged measuring 23 cm in maximum craniocaudal dimension. Hypodense lesion measuring 1.1 cm in the posterior right hepatic lobe appears unchanged from 1/29/2023 possibly a hemangioma. Consider follow-up MRI.  Gallbladder/Biliary tree: Status post cholecystectomy. No significant biliary dilatation.  Pancreas: Unremarkable.  Spleen: The spleen is enlarged measuring 13.8 cm in maximum craniocaudal dimension.  Adrenals: Unremarkable.  Genitourinary: 2 mm nonobstructing stone in the inferior right kidney. 3 mm nonobstructing stone in the mid left kidney. No hydronephrosis. No ureteral stones. No bladder stones or wall thickening. Multi loculated fluid collection with peripheral enhancement containing gas in the left pelvis measuring approximately 9.1 x 6.7 cm which closely abuts the left ovary and left uterine fundus probably arises from the left ovary and has significantly increased in size from 1/29/2023. This left pelvis/adnexal complex fluid collection closely abuts the sigmoid with focal areas of wall  thickening of the sigmoid.  Gastrointestinal: Status post right hemicolectomy.  Status post appendectomy. No bowel obstruction. No gastric wall thickening. No free air.  Peritoneum: Small amount of free fluid in the pelvis.  Vasculature: Unremarkable.  Lymph nodes: No pathologically enlarged lymph nodes.  Bones: Unremarkable.  Soft tissues: Unremarkable.  Incidental findings: None.    IMPRESSION:  1.  Multi loculated fluid collection with peripheral enhancement containing gas in the left pelvis measuring approximately 9.1 x 6.7 cm which closely abuts the left ovary and left uterine fundus and probably arises from the left ovary. This left pelvis/adnexal complex fluid collection closely abuts the sigmoid with focal area of wall thickening of the sigmoid. Overall findings are concerning for tubo-ovarian abscess with involvement of the sigmoid. Fistula formation is difficult to exclude. Alternatively, differential diagnosis includes a superinfected necrotic ovarian or uterine mass. Recommend surgery consultation.  2.  Hepatomegaly. Splenomegaly.  3.  Hypodense lesion measuring 1.1 cm in the posterior right hepatic lobe appears unchanged from 1/29/2023 possibly a hemangioma. Consider follow-up MRI.  4.  Bilateral nephrolithiasis without hydronephrosis.      Electronically signed by:  Aileen Martinez  8/29/2023 12:23 AM CDT Workstation: [id number]                                     Medications   piperacillin-tazobactam 3.375 g in dextrose 5 % 100 mL IVPB (ready to mix) (3.375 g Intravenous New Bag 8/28/23 2200)   iohexoL (OMNIPAQUE 350) injection 100 mL (100 mLs Intravenous Given 8/28/23 9700)   HYDROcodone-acetaminophen  mg per tablet 1 tablet (1 tablet Oral Given 8/29/23 0029)     Medical Decision Making  The patient's case was discussed in detail with the surgeon who recommends IV antibiotics pain meds and he was seen the morning.  Patient with perianal swelling differential diagnosis of course included cellulitis  versus abscess among others.  In this case the patient has an abscess and will be admitted.    I spoke to the hospitalist case discussed in detail and the patient is to be admitted    Risk  Prescription drug management.  Decision regarding hospitalization.                               Clinical Impression:   Final diagnoses:  [K61.1] Perirectal abscess (Primary)        ED Disposition Condition    Admit Stable                Chema Sadler MD  08/29/23 0050       Chema Sadler MD  08/29/23 0106

## 2023-08-29 NOTE — ASSESSMENT & PLAN NOTE
Incision and drainage of abscess in operating room today.  Antibiotics.  Suspect this is the cause of fevers.  I do worry that the intra-abdominal fluid collection may also be the cause of fevers.  This appears to be within the ovary and will be much more difficult to drain.  We will proceed with the I and D of the abscess 1st and then monitor her clinical response.    I have spoken with multiple physicians about this case- gyn, wilfrid

## 2023-08-29 NOTE — OP NOTE
I and D kanu anal abscess  Procedure Note    Date of procedure:   08/29/2023    Indications: 27 y/o female with kanu anal abscess and abdominal fluid collection presents with fever and pain to anal area.    Pre-operative Diagnosis: kanu anal abscess    Post-operative Diagnosis: Same    Surgeon: Darleen Pope MD    Assistants: Oneil Lacy MD    Anesthesia: General endotracheal anesthesia    ASA Class: 2    Procedure Details   The patient was seen in the Holding Room. The risks, benefits, complications, treatment options, and expected outcomes were discussed with the patient. The possibilities of reaction to medication, pulmonary aspiration, perforation of viscus, bleeding, recurrent infection, the need for additional procedures, failure to diagnose a condition, and creating a complication requiring transfusion or operation were discussed with the patient. The patient concurred with the proposed plan, giving informed consent. The site of surgery properly noted/marked. The patient was taken to Operating Room  identified as Agnes Matos and the procedure verified as I and d kanu anal abscess. A Time Out was held and the above information confirmed.    Full general anesthesia was induced with orotracheal intubation. The patient was prepped and draped in a supine position legs in stirrups. Appropriate antibiotics were given intravenously. Arms were out.    The perianal area was examined.  There was clearly an abscess at the 5 o'clock position in close proximity to the anus.  There appeared to be purulence coming from the vagina as well.  This was not in direct contact with a perineal abscess.  A vaginal exam was then performed to rule out a fistula from the vagina.  A whitish discharge was seen coming from the cervix but no fistula was seen.  This was cultured.  An anal exam was then performed and no fistula was found.  A radial incision was made over the abscess cavity and a large amount of purulence was  found.  This was suctioned and irrigated.  No tract was identified including after probing with a lacrimal probe.  Hemostasis was achieved using the cautery and the wound was packed.  The anal sphincters were not entered during this process.  There was some ulceration and internal hemorrhoids along the dentate line.  No clear fistula was observed.      Instrument, sponge, and needle counts were correct prior to wound closure and at the conclusion of the case.     Findings:  kanu anal abscess    Estimated Blood Loss: 30.0 cc    Drains: packing in kanu anal abscess    Total IV Fluids: see anesthesia    Specimens: kanu anal abscess    Implants: none    Complications:  None; patient tolerated the procedure well.    Disposition: PACU - hemodynamically stable.    Condition: stable    Attending Attestation: I was present and scrubbed for the entire procedure.

## 2023-08-29 NOTE — ANESTHESIA PROCEDURE NOTES
Intubation    Date/Time: 8/29/2023 1:28 PM    Performed by: Jeremiah Esquivel CRNA  Authorized by: Biju Sawyer MD    Intubation:     Induction:  Intravenous    Intubated:  Postinduction    Mask Ventilation:  Easy mask    Attempts:  1    Attempted By:  CRNA    Method of Intubation:  Video laryngoscopy    Blade:  Celis 3    Laryngeal View Grade: Grade I - full view of cords      Difficult Airway Encountered?: No      Complications:  None    Airway Device:  Oral endotracheal tube    Airway Device Size:  7.0    Style/Cuff Inflation:  Cuffed    Inflation Amount (mL):  6    Tube secured:  20    Secured at:  The lips    Placement Verified By:  Capnometry    Complicating Factors:  None    Findings Post-Intubation:  BS equal bilateral and atraumatic/condition of teeth unchanged

## 2023-08-29 NOTE — ANESTHESIA POSTPROCEDURE EVALUATION
Anesthesia Post Evaluation    Patient: Agnes Matos    Procedure(s) Performed: Procedure(s) (LRB):  INCISION, ABSCESS, PERIRECTAL (N/A)    Final Anesthesia Type: general      Patient location during evaluation: PACU  Patient participation: Yes- Able to Participate  Level of consciousness: awake and alert and oriented  Post-procedure vital signs: reviewed and stable  Pain management: adequate  Airway patency: patent    PONV status at discharge: No PONV  Anesthetic complications: no      Cardiovascular status: blood pressure returned to baseline and hemodynamically stable  Respiratory status: unassisted, spontaneous ventilation and room air  Hydration status: euvolemic  Follow-up not needed.          Vitals Value Taken Time   BP 96/53 08/29/23 1515   Temp 37.2 °C (98.9 °F) 08/29/23 1407   Pulse 75 08/29/23 1515   Resp 16 08/29/23 1515   SpO2 97 % 08/29/23 1515         Event Time   Out of Recovery 08/29/2023 15:19:19         Pain/Sanjiv Score: Pain Rating Prior to Med Admin: 4 (8/29/2023  2:50 PM)  Pain Rating Post Med Admin: 2 (8/29/2023  8:49 AM)  Sanjiv Score: 10 (8/29/2023  3:15 PM)

## 2023-08-29 NOTE — PROGRESS NOTES
Assumed care of patient today and patient seen and examined.  She is having pain but controlled with pain medication.  She denies fever or chills.  She is NPO for surgery today.  Reviewed consultant recommendations and discussed with RN.  Continue care as outlined in H&P.

## 2023-08-29 NOTE — PROGRESS NOTES
Pharmacist Renal Adjustment Note    Agnes Matos is a 28 y.o. female being treated with Zosyn.     Patient Data:    Vital Signs (Most Recent):  Temp: 97.2 °F (36.2 °C) (08/29/23 0228)  Pulse: 80 (08/29/23 0228)  Resp: 18 (08/29/23 0228)  BP: (!) 96/49 (08/29/23 0228)  SpO2: 97 % (08/29/23 0228) Vital Signs (72h Range):  Temp:  [97.2 °F (36.2 °C)-102 °F (38.9 °C)]   Pulse:  []   Resp:  [16-20]   BP: ()/(49-73)   SpO2:  [96 %-99 %]      Recent Labs   Lab 08/28/23  1733   CREATININE 0.7     Serum creatinine: 0.7 mg/dL 08/28/23 1733  Estimated creatinine clearance: 117.3 mL/min    Zosyn 3.375 g every 6 hours will be changed to Zosyn 3.375 g every 8 hours per Renal Dose Adjustment protocol.    Pharmacist's Name: Alma Sadler  Pharmacist's Extension: [phone]

## 2023-08-29 NOTE — SUBJECTIVE & OBJECTIVE
Past Medical History:   Diagnosis Date    Anxiety     Cholestasis during pregnancy     Crohn's colitis     Depression     Endometriosis     GERD (gastroesophageal reflux disease)     IBS (irritable bowel syndrome)     TMJ (dislocation of temporomandibular joint)        Past Surgical History:   Procedure Laterality Date     SECTION  17 &     Two births     SECTION N/A 2023    Procedure:  SECTION;  Surgeon: Lisha Mcdonough MD;  Location: [facility] L&D;  Service: OB/GYN;  Laterality: N/A;    COLON SURGERY  21    A small part along with tbe small bowel    COLONOSCOPY N/A 2022    Procedure: COLONOSCOPY;  Surgeon: Gregorio Bourne MD;  Location: [facility];  Service: Gastroenterology;  Laterality: N/A;    CYSTOSCOPY W/ URETERAL STENT PLACEMENT Right 2/10/2023    Procedure: CYSTOSCOPY, WITH URETERAL STENT INSERTION;  Surgeon: Marry Erickson MD;  Location: [facility];  Service: Urology;  Laterality: Right;    DIAGNOSTIC LAPAROSCOPY Left 2022    Procedure: LAPAROSCOPY, DIAGNOSTIC- SAMPLING OF PERITONEAL FLUID;  Surgeon: Yajaira Siegel MD;  Location: [facility] OR;  Service: OB/GYN;  Laterality: Left;    presacral neurectomy          SMALL INTESTINE SURGERY  2021    partial with partial large bowel    SURGICAL REMOVAL OF ENDOMETRIOSIS      TONSILLECTOMY      TUBAL LIGATION  10-26-19    After c-secrtion    URETEROSCOPIC REMOVAL OF URETERIC CALCULUS Right 2/10/2023    Procedure: REMOVAL, CALCULUS, URETER, URETEROSCOPIC;  Surgeon: Marry Erickson MD;  Location: [facility];  Service: Urology;  Laterality: Right;       Review of patient's allergies indicates:   Allergen Reactions    Methotrexate Swelling     Hands face      Stelara [ustekinumab] Hives    Vedolizumab Hives    Adalimumab Rash, Itching and Hives     joint ache      Renflexis [infliximab-abda] Rash     Other reaction(s): Rash or Itch, Other: joint ache, Rash or Itch, Other: joint ache       No current  facility-administered medications on file prior to encounter.     Current Outpatient Medications on File Prior to Encounter   Medication Sig    oxyCODONE-acetaminophen (PERCOCET) 5-325 mg per tablet Take 1 tablet by mouth every 6 (six) hours as needed for Pain. (Patient not taking: Reported on 8/28/2023)     Family History    None       Tobacco Use    Smoking status: Never    Smokeless tobacco: Never   Substance and Sexual Activity    Alcohol use: Not Currently     Comment: Social once every six months    Drug use: Never    Sexual activity: Yes     Partners: Male     Birth control/protection: See Surgical Hx     Comment: Tubal ligation     Review of Systems   Constitutional:  Positive for fever. Negative for chills and diaphoresis.   HENT:  Negative for congestion, postnasal drip, sinus pressure and sore throat.    Eyes:  Negative for visual disturbance.   Respiratory:  Negative for cough, chest tightness, shortness of breath and wheezing.    Cardiovascular:  Negative for chest pain, palpitations and leg swelling.   Gastrointestinal:  Negative for abdominal distention, abdominal pain, blood in stool, constipation, diarrhea, nausea and vomiting.   Endocrine: Negative.    Genitourinary:  Negative for dysuria.   Musculoskeletal: Negative.    Skin:  Positive for wound (perianal abscess).   Allergic/Immunologic: Negative.    Neurological:  Negative for dizziness, weakness, numbness and headaches.   Hematological: Negative.    Psychiatric/Behavioral: Negative.       Objective:     Vital Signs (Most Recent):  Temp: 99.9 °F (37.7 °C) (08/28/23 1651)  Pulse: (!) 130 (08/28/23 1651)  Resp: 16 (08/29/23 0029)  BP: 123/73 (08/28/23 1651)  SpO2: 96 % (08/28/23 1651) Vital Signs (24h Range):  Temp:  [99.9 °F (37.7 °C)-102 °F (38.9 °C)] 99.9 °F (37.7 °C)  Pulse:  [107-130] 130  Resp:  [16-20] 16  SpO2:  [96 %-97 %] 96 %  BP: (102-123)/(63-73) 123/73     Weight: 77.6 kg (171 lb)  Body mass index is 30.29 kg/m².     Physical  Exam  Constitutional:       General: She is awake. She is not in acute distress.     Appearance: Normal appearance. She is well-developed. She is not toxic-appearing or diaphoretic.   HENT:      Head: Normocephalic and atraumatic.   Eyes:      General: Lids are normal.      Conjunctiva/sclera: Conjunctivae normal.      Pupils: Pupils are equal, round, and reactive to light.   Neck:      Thyroid: No thyroid mass or thyromegaly.      Vascular: Normal carotid pulses. No JVD.      Trachea: Trachea normal. No tracheal deviation.   Cardiovascular:      Rate and Rhythm: Normal rate and regular rhythm.      Pulses: Normal pulses.      Heart sounds: Normal heart sounds, S1 normal and S2 normal.   Pulmonary:      Effort: Pulmonary effort is normal.      Breath sounds: Normal breath sounds. No stridor.   Abdominal:      General: Bowel sounds are normal.      Palpations: Abdomen is soft.      Tenderness: There is no abdominal tenderness.   Musculoskeletal:         General: Normal range of motion.      Cervical back: Full passive range of motion without pain, normal range of motion and neck supple.   Skin:     General: Skin is warm and dry.      Findings: Abscess (perianal abscess) present.      Nails: There is no clubbing.   Neurological:      Mental Status: She is alert and oriented to person, place, and time.      Cranial Nerves: No cranial nerve deficit.      Sensory: No sensory deficit.   Psychiatric:         Speech: Speech normal.         Behavior: Behavior normal. Behavior is cooperative.         Thought Content: Thought content normal.         Judgment: Judgment normal.              CRANIAL NERVES     CN III, IV, VI   Pupils are equal, round, and reactive to light.       Significant Labs: All pertinent labs within the past 24 hours have been reviewed.  Bilirubin:   Recent Labs   Lab 08/28/23  1733   BILITOT 0.9     BMP:   Recent Labs   Lab 08/28/23  1733   GLU 89   *   K 3.7   CL 99   CO2 24   BUN 14   CREATININE  0.7   CALCIUM 9.1     CBC:   Recent Labs   Lab 08/28/23  1733   WBC 16.27*   HGB 9.7*   HCT 30.7*   *     CMP:   Recent Labs   Lab 08/28/23  1733   *   K 3.7   CL 99   CO2 24   GLU 89   BUN 14   CREATININE 0.7   CALCIUM 9.1   PROT 8.0   ALBUMIN 3.8   BILITOT 0.9   ALKPHOS 142*   AST 51*   ALT 62*   ANIONGAP 11     Lactic Acid:   Recent Labs   Lab 08/28/23  1733   LACTATE 0.8     Urine Studies:   Recent Labs   Lab 08/28/23  1659   COLORU Yellow   APPEARANCEUA Hazy*   PHUR 6.0   SPECGRAV 1.020   PROTEINUA Negative   GLUCUA Negative   KETONESU Negative   BILIRUBINUA Negative   OCCULTUA Negative   NITRITE Negative   UROBILINOGEN Negative   LEUKOCYTESUR 3+*   RBCUA 2   WBCUA >100*   BACTERIA Rare   SQUAMEPITHEL 4   HYALINECASTS 9*       Significant Imaging: I have reviewed all pertinent imaging results/findings within the past 24 hours.  CT Abdomen Pelvis With Contrast    Result Date: 8/29/2023  CT ABDOMEN PELVIS WITH IV CONTRAST INDICATION: 28 years Female; rectal abscess TECHNIQUE:  CT scan of the abdomen and pelvis was performed with IV contrast.  This exam was performed according to our departmental dose-optimization program, which includes automated exposure control, adjustment of the mA and/or kV according to patient size and/or use of iterative reconstruction technique. Unless otherwise specified, incidental findings do not require dedicated imaging follow-up. Comparison: 1/29/2023. FINDINGS: Liver: The liver is enlarged measuring 23 cm in maximum craniocaudal dimension. Hypodense lesion measuring 1.1 cm in the posterior right hepatic lobe appears unchanged from 1/29/2023 possibly a hemangioma. Consider follow-up MRI. Gallbladder/Biliary tree: Status post cholecystectomy. No significant biliary dilatation. Pancreas: Unremarkable. Spleen: The spleen is enlarged measuring 13.8 cm in maximum craniocaudal dimension. Adrenals: Unremarkable. Genitourinary: 2 mm nonobstructing stone in the inferior right  kidney. 3 mm nonobstructing stone in the mid left kidney. No hydronephrosis. No ureteral stones. No bladder stones or wall thickening. Multi loculated fluid collection with peripheral enhancement containing gas in the left pelvis measuring approximately 9.1 x 6.7 cm which closely abuts the left ovary and left uterine fundus probably arises from the left ovary and has significantly increased in size from 1/29/2023. This left pelvis/adnexal complex fluid collection closely abuts the sigmoid with focal areas of wall thickening of the sigmoid. Gastrointestinal: Status post right hemicolectomy.  Status post appendectomy. No bowel obstruction. No gastric wall thickening. No free air. Peritoneum: Small amount of free fluid in the pelvis. Vasculature: Unremarkable. Lymph nodes: No pathologically enlarged lymph nodes. Bones: Unremarkable. Soft tissues: Unremarkable. Incidental findings: None. IMPRESSION: 1.  Multi loculated fluid collection with peripheral enhancement containing gas in the left pelvis measuring approximately 9.1 x 6.7 cm which closely abuts the left ovary and left uterine fundus and probably arises from the left ovary. This left pelvis/adnexal complex fluid collection closely abuts the sigmoid with focal area of wall thickening of the sigmoid. Overall findings are concerning for tubo-ovarian abscess with involvement of the sigmoid. Fistula formation is difficult to exclude. Alternatively, differential diagnosis includes a superinfected necrotic ovarian or uterine mass. Recommend surgery consultation. 2.  Hepatomegaly. Splenomegaly. 3.  Hypodense lesion measuring 1.1 cm in the posterior right hepatic lobe appears unchanged from 1/29/2023 possibly a hemangioma. Consider follow-up MRI. 4.  Bilateral nephrolithiasis without hydronephrosis. Electronically signed by:  Aileen Martinez  8/29/2023 12:23 AM CDT Workstation: [id number]

## 2023-08-30 ENCOUNTER — TELEPHONE (OUTPATIENT)
Dept: FAMILY MEDICINE | Facility: CLINIC | Age: 28
End: 2023-08-30

## 2023-08-30 DIAGNOSIS — K50.919 CROHN'S DISEASE OF INTESTINE WITH COMPLICATION: Primary | ICD-10-CM

## 2023-08-30 LAB
ALBUMIN SERPL BCP-MCNC: 3.1 G/DL (ref 3.5–5.2)
ALP SERPL-CCNC: 116 U/L (ref 55–135)
ALT SERPL W/O P-5'-P-CCNC: 38 U/L (ref 10–44)
ANION GAP SERPL CALC-SCNC: 8 MMOL/L (ref 8–16)
AST SERPL-CCNC: 28 U/L (ref 10–40)
BASOPHILS # BLD AUTO: 0.02 K/UL (ref 0–0.2)
BASOPHILS NFR BLD: 0.2 % (ref 0–1.9)
BILIRUB SERPL-MCNC: 0.6 MG/DL (ref 0.1–1)
BUN SERPL-MCNC: 10 MG/DL (ref 6–20)
CALCIUM SERPL-MCNC: 8.5 MG/DL (ref 8.7–10.5)
CHLORIDE SERPL-SCNC: 101 MMOL/L (ref 95–110)
CO2 SERPL-SCNC: 26 MMOL/L (ref 23–29)
CREAT SERPL-MCNC: 0.8 MG/DL (ref 0.5–1.4)
DIFFERENTIAL METHOD: ABNORMAL
EOSINOPHIL # BLD AUTO: 0.1 K/UL (ref 0–0.5)
EOSINOPHIL NFR BLD: 0.4 % (ref 0–8)
ERYTHROCYTE [DISTWIDTH] IN BLOOD BY AUTOMATED COUNT: 19 % (ref 11.5–14.5)
EST. GFR  (NO RACE VARIABLE): >60 ML/MIN/1.73 M^2
GLUCOSE SERPL-MCNC: 91 MG/DL (ref 70–110)
GRAM STN SPEC: NORMAL
HCT VFR BLD AUTO: 27.7 % (ref 37–48.5)
HGB BLD-MCNC: 8.4 G/DL (ref 12–16)
IMM GRANULOCYTES # BLD AUTO: 0.05 K/UL (ref 0–0.04)
IMM GRANULOCYTES NFR BLD AUTO: 0.4 % (ref 0–0.5)
LYMPHOCYTES # BLD AUTO: 1.4 K/UL (ref 1–4.8)
LYMPHOCYTES NFR BLD: 11.9 % (ref 18–48)
MAGNESIUM SERPL-MCNC: 1.6 MG/DL (ref 1.6–2.6)
MCH RBC QN AUTO: 21.9 PG (ref 27–31)
MCHC RBC AUTO-ENTMCNC: 30.3 G/DL (ref 32–36)
MCV RBC AUTO: 72 FL (ref 82–98)
MONOCYTES # BLD AUTO: 0.7 K/UL (ref 0.3–1)
MONOCYTES NFR BLD: 5.6 % (ref 4–15)
NEUTROPHILS # BLD AUTO: 9.8 K/UL (ref 1.8–7.7)
NEUTROPHILS NFR BLD: 81.5 % (ref 38–73)
NRBC BLD-RTO: 0 /100 WBC
PHOSPHATE SERPL-MCNC: 4.4 MG/DL (ref 2.7–4.5)
PLATELET # BLD AUTO: 361 K/UL (ref 150–450)
PMV BLD AUTO: 9 FL (ref 9.2–12.9)
POTASSIUM SERPL-SCNC: 4 MMOL/L (ref 3.5–5.1)
PROT SERPL-MCNC: 6.8 G/DL (ref 6–8.4)
RBC # BLD AUTO: 3.84 M/UL (ref 4–5.4)
SODIUM SERPL-SCNC: 135 MMOL/L (ref 136–145)
WBC # BLD AUTO: 12.06 K/UL (ref 3.9–12.7)

## 2023-08-30 PROCEDURE — 25000003 PHARM REV CODE 250: Performed by: SURGERY

## 2023-08-30 PROCEDURE — 85025 COMPLETE CBC W/AUTO DIFF WBC: CPT | Performed by: SURGERY

## 2023-08-30 PROCEDURE — 83735 ASSAY OF MAGNESIUM: CPT | Performed by: SURGERY

## 2023-08-30 PROCEDURE — 63600175 PHARM REV CODE 636 W HCPCS: Performed by: SURGERY

## 2023-08-30 PROCEDURE — 80053 COMPREHEN METABOLIC PANEL: CPT | Performed by: SURGERY

## 2023-08-30 PROCEDURE — 36415 COLL VENOUS BLD VENIPUNCTURE: CPT | Performed by: SURGERY

## 2023-08-30 PROCEDURE — 84100 ASSAY OF PHOSPHORUS: CPT | Performed by: SURGERY

## 2023-08-30 PROCEDURE — 12000002 HC ACUTE/MED SURGE SEMI-PRIVATE ROOM

## 2023-08-30 RX ADMIN — METRONIDAZOLE 500 MG: 5 INJECTION, SOLUTION INTRAVENOUS at 04:08

## 2023-08-30 RX ADMIN — METRONIDAZOLE 500 MG: 5 INJECTION, SOLUTION INTRAVENOUS at 12:08

## 2023-08-30 RX ADMIN — OXYCODONE HYDROCHLORIDE AND ACETAMINOPHEN 1 TABLET: 10; 325 TABLET ORAL at 03:08

## 2023-08-30 RX ADMIN — OXYCODONE HYDROCHLORIDE AND ACETAMINOPHEN 1 TABLET: 10; 325 TABLET ORAL at 01:08

## 2023-08-30 RX ADMIN — CEFTRIAXONE SODIUM 1 G: 1 INJECTION, POWDER, FOR SOLUTION INTRAMUSCULAR; INTRAVENOUS at 03:08

## 2023-08-30 RX ADMIN — DOXYCYCLINE 100 MG: 100 INJECTION, POWDER, LYOPHILIZED, FOR SOLUTION INTRAVENOUS at 07:08

## 2023-08-30 RX ADMIN — POLYETHYLENE GLYCOL 3350 17 G: 17 POWDER, FOR SOLUTION ORAL at 08:08

## 2023-08-30 RX ADMIN — OXYCODONE HYDROCHLORIDE AND ACETAMINOPHEN 1 TABLET: 10; 325 TABLET ORAL at 09:08

## 2023-08-30 RX ADMIN — DOXYCYCLINE 100 MG: 100 INJECTION, POWDER, LYOPHILIZED, FOR SOLUTION INTRAVENOUS at 09:08

## 2023-08-30 RX ADMIN — METRONIDAZOLE 500 MG: 5 INJECTION, SOLUTION INTRAVENOUS at 08:08

## 2023-08-30 NOTE — CONSULTS
GASTROENTEROLOGY INPATIENT CONSULT NOTE  Patient Name: Agnes Matos  Patient MRN: [id number]  Patient : 1995    Admit Date: 2023  Service date: 2023    Reason for Consult: Crohn's disease / fistula / ovary mass    PCP: Rose Torres MD    Chief Complaint   Patient presents with    Abscess     Rectum       HPI: Patient is a 28 y.o. female with PMHx BTL/csxn, Crohn's disease s/p R hemicolectomy off rx, cholecystectomy, csxn/BTL presents for evaluation of LLQ discomfort and rectal abscess. 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 There is a large growing L ovarian structure and unclear if this is an abscess vs ovarian lesion vs other. Has been seen by gyn and has bene discussions for resections but some concerns regarding possible IBD activity     OSH Records:   CT Abd  - 9x7cm fluid/gas collection ear sigmoid w/ questionable communication/fistula  MRCP  - Nml CBD / GB  Colon  - R hemicolectomy; Nml TI / colon; Bx: minimal-mild chronic colitis   Colon  -R hemicolectomy; Nml TI / colon. Bx: ileitis w/ mild transverse colitis; Nml L colon / rect  EGD  -  Normal prox/distal esophageal bx; HP neg gastritis; Nml duodenal bx  Calprotectin  - Mild elevation 137  Past tx: Humira, Remicade, Entyvio, Sterlara, MTX, 6MP, budesonide  -Humira - was on for 6 months; stopped due to itching and GI issues  -Remicade - swelling / numbness  -Entyvio - rash  -Sterlara - hives on back w/ itching   Surgery  - ileo-colon resection; Bx: active/chronic ileitis w/ fissuring ulcer, transmural inflammation and metaplasia  Colon ' - min ileitis / sigmoid colitis  TPMT - Nml; IgA undetetable; + HLA DQ2; Negative HLA DQ8  Crohn's dx'd .     Past Medical History:  Past Medical History:   Diagnosis Date    Anxiety     Cholestasis during pregnancy     Crohn's colitis     Depression     Endometriosis     GERD (gastroesophageal reflux disease)     IBS (irritable bowel  syndrome)     TMJ (dislocation of temporomandibular joint)         Past Surgical History:  Past Surgical History:   Procedure Laterality Date     SECTION  17 &     Two births     SECTION N/A 2023    Procedure:  SECTION;  Surgeon: Lisha Mcdonough MD;  Location: [facility] L&D;  Service: OB/GYN;  Laterality: N/A;    COLON SURGERY  21    A small part along with tbe small bowel    COLONOSCOPY N/A 2022    Procedure: COLONOSCOPY;  Surgeon: Gregorio Bourne MD;  Location: [facility];  Service: Gastroenterology;  Laterality: N/A;    CYSTOSCOPY W/ URETERAL STENT PLACEMENT Right 2/10/2023    Procedure: CYSTOSCOPY, WITH URETERAL STENT INSERTION;  Surgeon: Marry Erickson MD;  Location: [facility] OR;  Service: Urology;  Laterality: Right;    DIAGNOSTIC LAPAROSCOPY Left 2022    Procedure: LAPAROSCOPY, DIAGNOSTIC- SAMPLING OF PERITONEAL FLUID;  Surgeon: Yajaira Siegel MD;  Location: [facility] OR;  Service: OB/GYN;  Laterality: Left;    INCISION OF PERIRECTAL ABSCESS N/A 2023    Procedure: INCISION, ABSCESS, PERIRECTAL;  Surgeon: Darleen Pope MD;  Location: [facility] OR;  Service: General;  Laterality: N/A;    presacral neurectomy          SMALL INTESTINE SURGERY  2021    partial with partial large bowel    SURGICAL REMOVAL OF ENDOMETRIOSIS      TONSILLECTOMY  2006    TUBAL LIGATION  10-26-19    After c-secrtion    URETEROSCOPIC REMOVAL OF URETERIC CALCULUS Right 2/10/2023    Procedure: REMOVAL, CALCULUS, URETER, URETEROSCOPIC;  Surgeon: Marry Erickson MD;  Location: [facility] OR;  Service: Urology;  Laterality: Right;        Home Medications:  Medications Prior to Admission   Medication Sig Dispense Refill Last Dose    oxyCODONE-acetaminophen (PERCOCET) 5-325 mg per tablet Take 1 tablet by mouth every 6 (six) hours as needed for Pain. (Patient not taking: Reported on 2023) 20 tablet 0        Inpatient Medications:   cefTRIAXone (ROCEPHIN) IVPB  1 g Intravenous  "Q24H    doxycycline (VIBRAMYCIN) IVPB  100 mg Intravenous Q12H    metronidazole  500 mg Intravenous Q8H    polyethylene glycol  17 g Oral Daily     acetaminophen, dextrose 50%, dextrose 50%, glucagon (human recombinant), glucose, glucose, magnesium oxide, magnesium oxide, melatonin, morphine, naloxone, ondansetron, oxyCODONE-acetaminophen, potassium bicarbonate, potassium bicarbonate, potassium bicarbonate, potassium, sodium phosphates, potassium, sodium phosphates, potassium, sodium phosphates, sodium chloride 0.9%    Review of patient's allergies indicates:   Allergen Reactions    Methotrexate Swelling     Hands face      Stelara [ustekinumab] Hives    Vedolizumab Hives    Adalimumab Rash, Itching and Hives     joint ache      Renflexis [infliximab-abda] Rash     Other reaction(s): Rash or Itch, Other: joint ache, Rash or Itch, Other: joint ache       Social History:   Social History     Occupational History    Not on file   Tobacco Use    Smoking status: Never    Smokeless tobacco: Never   Substance and Sexual Activity    Alcohol use: Not Currently     Comment: Social once every six months    Drug use: Never    Sexual activity: Yes     Partners: Male     Birth control/protection: See Surgical Hx     Comment: Tubal ligation       Family History:   History reviewed. No pertinent family history.    Review of Systems:  A 10 point review of systems was performed and was normal, except as mentioned in the HPI, including constitutional, HEENT, heme, lymph, cardiovascular, respiratory, gastrointestinal, genitourinary, neurologic, endocrine, psychiatric and musculoskeletal.      OBJECTIVE:    Physical Exam:  24 Hour Vital Sign Ranges: Temp:  [97.6 °F (36.4 °C)-98.4 °F (36.9 °C)] 98.4 °F (36.9 °C)  Pulse:  [] 105  Resp:  [16-20] 20  SpO2:  [95 %-100 %] 96 %  BP: (81-95)/(50-65) 87/51  Most recent vitals: BP (!) 87/51   Pulse 105   Temp 98.4 °F (36.9 °C) (Oral)   Resp 20   Ht 5' 3" (1.6 m)   Wt 76.7 kg (169 lb " "1.5 oz)   LMP 09/01/2022 (Approximate) Comment: 9 Weeks Postpartum  SpO2 96%   Breastfeeding Yes   BMI 29.95 kg/m²    GEN: well-developed, well-nourished, awake and alert, non-toxic appearing adult  HEENT: PERRL, sclera anicteric, oral mucosa pink and moist without lesion  NECK: trachea midline; Good ROM  CV: regular rate and rhythm, no murmurs or gallops  RESP: clear to auscultation bilaterally, no wheezes, rhonci or rales  ABD: soft, min-tender, non-distended, normal bowel sounds  EXT: no swelling or edema, 2+ pulses distally  SKIN: no rashes or jaundice  PSYCH: normal affect    Labs:   Recent Labs     08/28/23 1733 08/29/23  0537 08/30/23  0513   WBC 16.27* 12.95* 12.06   MCV 70* 71* 72*   * 379 361     Recent Labs     08/28/23 1733 08/29/23 0537 08/30/23  0513   * 137 135*   K 3.7 3.5 4.0   CL 99 102 101   CO2 24 28 26   BUN 14 12 10   GLU 89 99 91     No results for input(s): "ALB" in the last 72 hours.    Invalid input(s): "ALKP", "SGOT", "SGPT", "TBIL", "DBIL", "TPRO"  No results for input(s): "PT", "INR", "PTT" in the last 72 hours.      Radiology Review:  CT Abdomen Pelvis With Contrast   Final Result            IMPRESSION / RECOMMENDATIONS:  28 y.o. female with PMHx BTL/csxn, Crohn's disease s/p R hemicolectomy off rx, cholecystectomy, csxn/BTL presents for evaluation of LLQ discomfort and rectal abscess. Small abcess vs fistulat s/p debridement in OR. Had had colonoscopy x 2 in past year w/ only minimal disease. Due to past crohn's w/ resection,etc, does need biologic for long term management. However before any further treatment is started, definitive therapy / surgery should be done to remove this lesion xiomara if abscess component.     -Conservative from endoscopic management at this point as has had 2 already this year with only mild disease and would not change current management.   -Recommend surgery / gyn to remove lesion +/- evaluate for abscess / fistula.   -Will need skyrizi or " rinvoq once above issues are resolved    Thank you for this consult.    [employer]  8/30/2023  5:21 PM

## 2023-08-30 NOTE — PROGRESS NOTES
[facility] Medicine  Progress Note    Patient Name: Agnes Matos  MRN: [id number]  Patient Class: IP- Inpatient   Admission Date: 8/28/2023  Length of Stay: 1 days  Attending Physician: Daily Ibrahim MD  Primary Care Provider: Rose Torres MD        Subjective:     Principal Problem:Perianal abscess        HPI:  Agnes Matos is a 28 y.o. female with a history as  has a past medical history of Anxiety, Cholestasis during pregnancy, Crohn's colitis, Depression, Endometriosis, GERD (gastroesophageal reflux disease), IBS (irritable bowel syndrome), and TMJ (dislocation of temporomandibular joint). who presented to the ED with a Abscess (Rectum)    Patient presents to the emergency room for evaluation of perianal abscess.  Patient reports she was seen pellet can not urgent care for anal abscess that was draining blood and pus.  At urgent care she was noted to have a temp of a 102° provider reported he could not excise the abscess and recommended that she go to the emergency room given her elevated temp.     Lab and imaging obtained and reviewed.  CBC completed and shows WBCs 16.27 H/H 9.7/30.7 MCV 70 MCH 21.9 MCHC 31.6 RDW is 19.3 platelets 457 MPV 8.7 g% 81.2 lymphocytes 13.2.  Chemistry profile shows sodium 134 alk-phos 142 AST/ALT 51/62.  Lactic acid within normal range.  UA with hazy color 3+ leukocytes greater than 100 WBCs 9 hyaline casts.  On admit, temp 99.9° with T-max of 102°, , RR 20, /73, O2 sats 96% on room air.       CT abdomen/pelvis  IMPRESSION:  1.  Multi loculated fluid collection with peripheral enhancement containing gas in the left pelvis measuring approximately 9.1 x 6.7 cm which closely abuts the left ovary and left uterine fundus and probably arises from the left ovary. This left pelvis/adnexal complex fluid collection closely abuts the sigmoid with focal area of wall thickening of the sigmoid. Overall findings are concerning for tubo-ovarian abscess with  involvement of the sigmoid. Fistula formation is difficult to exclude. Alternatively, differential diagnosis includes a superinfected necrotic ovarian or uterine mass. Recommend surgery consultation.  2.  Hepatomegaly. Splenomegaly.  3.  Hypodense lesion measuring 1.1 cm in the posterior right hepatic lobe appears unchanged from 1/29/2023 possibly a hemangioma. Consider follow-up MRI.  4.  Bilateral nephrolithiasis without hydronephrosis.    Per ED provider patient with perianal abscess.  On admit fever resolved. Noted to have elevated white count. CT abdomen pelvis was completed (as above).  General surgery was consulted with recommendations to consult OB/GYN given CT results and initiate IV ABX for possible intervention.     Patient reports she recently had a baby in June 2023 and was aware of the mass represented in the CT findings and states it was supposed to be removed during the time of delivery however it was embedded into her uterus so procedure was postponed.  She further states she was seen at [facility] and was schedule to have mass removed 09/07/2023, but, was told that the mass was seen on their imaging. Patient is aware of the mass, however, wasn't aware that it was necrotic.         Overview/Hospital Course:  No notes on file    Interval History:  Complains of fatigue and diaphoresis.  Afebrile.  Pain controlled.    Review of Systems Complete ROS otherwise negative other than stated in HPI.   Objective:     Vital Signs (Most Recent):  Temp: 97.9 °F (36.6 °C) (08/30/23 1108)  Pulse: 80 (08/30/23 1108)  Resp: 18 (08/30/23 1108)  BP: (!) 90/50 (08/30/23 1108)  SpO2: 99 % (08/30/23 1108) Vital Signs (24h Range):  Temp:  [97.5 °F (36.4 °C)-98.9 °F (37.2 °C)] 97.9 °F (36.6 °C)  Pulse:  [68-98] 80  Resp:  [15-20] 18  SpO2:  [95 %-100 %] 99 %  BP: (81-96)/(50-65) 90/50     Weight: 76.7 kg (169 lb 1.5 oz)  Body mass index is 29.95 kg/m².    Intake/Output Summary (Last 24 hours) at 8/30/2023 1228  Last data filed  at 2023 0818  Gross per 24 hour   Intake 2775 ml   Output --   Net 2775 ml         Physical Exam  GENERAL:    Lethargic and pale appearing  HEENT:  EOMI. Conjunctivae intact. Posterior pharynx clear, oral mucosa moist  NECK:  Supple   LUNGS:  No respiratory distress. Clear to auscultation bilaterally with good air movement  CARDIAC:  RRR without murmur, rub or gallop  ABDOMEN:  Soft,  Nontender and nondistended, no rebound or guarding, bowel sounds present   EXTREMITIES:  Peripheral pulses are 2+. Hands and feet are warm. Good capillary refill in fingers (< 2 seconds). No clubbing, cyanosis or edema          Significant Labs: All pertinent labs within the past 24 hours have been reviewed.  Blood Culture:   Recent Labs   Lab 23   LABBLOO No Growth to date  No Growth to date No Growth to date  No Growth to date     BMP:   Recent Labs   Lab 23  0513   GLU 91   *   K 4.0      CO2 26   BUN 10   CREATININE 0.8   CALCIUM 8.5*   MG 1.6     CBC:   Recent Labs   Lab 23  0537 23  0513   WBC 16.27* 12.95* 12.06   HGB 9.7* 9.0* 8.4*   HCT 30.7* 29.6* 27.7*   * 379 361     CMP:   Recent Labs   Lab 23  0537 23  0513   * 137 135*   K 3.7 3.5 4.0   CL 99 102 101   CO2 24 28 26   GLU 89 99 91   BUN 14 12 10   CREATININE 0.7 0.8 0.8   CALCIUM 9.1 8.9 8.5*   PROT 8.0 7.1 6.8   ALBUMIN 3.8 3.2* 3.1*   BILITOT 0.9 1.1* 0.6   ALKPHOS 142* 119 116   AST 51* 31 28   ALT 62* 46* 38   ANIONGAP 11 7* 8       Significant Imaging: I have reviewed all pertinent imaging results/findings within the past 24 hours.      Assessment/Plan:      Perianal abscess:  Patient has history of Crohn's disease and recent  and presented with a draining rectal abscess with fever.  She had incision and drainage by General surgery yesterday.  Follow surgical site cultures and peripheral cultures.  No fistula connection seen during surgery.   Continue antibiotics with Rocephin and doxycycline and Flagyl, continue IV fluids and supportive care    Concern for tubo-ovarian abscess: Had known large ovarian cyst and multiloculated fluid collection abutting the left ovary and left uterine fundus.  Has been seen by OBGYN who recommends General surgery management, follow further recommendations    Anemia:  Multifactorial.  Monitor daily, not at transfusion threshold      VTE Risk Mitigation (From admission, onward)         Ordered     Place CRISTHIAN hose  Until discontinued         08/29/23 0124     IP VTE HIGH RISK PATIENT  Once         08/29/23 0124     Place sequential compression device  Until discontinued         08/29/23 0124                Discharge Planning   KATHLEEN: 9/1/2023     Code Status: Full Code   Is the patient medically ready for discharge?:     Reason for patient still in hospital (select all that apply): Laboratory test and Treatment  Discharge Plan A: Home with family                  Daily Ibrahim MD  Department of Hospital Medicine   [facility]

## 2023-08-30 NOTE — PROGRESS NOTES
Still having pain  No vaginal discharge  Labs vitals reviewed  No more fevers  Wound with packing much less redness    Ap  Caitie anal abscess  Continue packing, continue antibiotics  Consult gi - hx of crohns- needs options for treatment, timing for gynecologic surgery

## 2023-08-30 NOTE — TELEPHONE ENCOUNTER
----- Message from Mario Wade sent at 8/29/2023  1:40 PM CDT -----  Contact: ophelia  Type: Needs Medical Advice  Who Called:  ophelia  Nguyễn Call Back Number: [phone]    Additional Information: Ophelia is calling the office to get a referral sent over to see Dr. Terra Bhatia at [facility] needs referral for GI specialist, reason they are referring her because of crohn's disease.Please call back and advise.

## 2023-08-30 NOTE — TELEPHONE ENCOUNTER
Spoke with yohannes with dr funes.  [facility] patient had a colorectal abscess due to her chrons.  States recent surgery due to abscess.  Needs to see dr salazar until abscess is cleared for dr funes to complete surgery.  Referral pended- MADDIE

## 2023-08-31 VITALS
WEIGHT: 169.06 LBS | RESPIRATION RATE: 18 BRPM | SYSTOLIC BLOOD PRESSURE: 109 MMHG | HEIGHT: 63 IN | HEART RATE: 72 BPM | OXYGEN SATURATION: 98 % | TEMPERATURE: 98 F | DIASTOLIC BLOOD PRESSURE: 58 MMHG | BODY MASS INDEX: 29.95 KG/M2

## 2023-08-31 LAB
ALBUMIN SERPL BCP-MCNC: 3.1 G/DL (ref 3.5–5.2)
ALP SERPL-CCNC: 106 U/L (ref 55–135)
ALT SERPL W/O P-5'-P-CCNC: 29 U/L (ref 10–44)
ANION GAP SERPL CALC-SCNC: 6 MMOL/L (ref 8–16)
AST SERPL-CCNC: 16 U/L (ref 10–40)
BACTERIA SPEC ANAEROBE CULT: NORMAL
BACTERIA UR CULT: NORMAL
BACTERIA UR CULT: NORMAL
BASOPHILS # BLD AUTO: 0.02 K/UL (ref 0–0.2)
BASOPHILS NFR BLD: 0.2 % (ref 0–1.9)
BILIRUB SERPL-MCNC: 0.4 MG/DL (ref 0.1–1)
BUN SERPL-MCNC: 10 MG/DL (ref 6–20)
CALCIUM SERPL-MCNC: 8.6 MG/DL (ref 8.7–10.5)
CHLORIDE SERPL-SCNC: 104 MMOL/L (ref 95–110)
CO2 SERPL-SCNC: 27 MMOL/L (ref 23–29)
CREAT SERPL-MCNC: 0.7 MG/DL (ref 0.5–1.4)
DIFFERENTIAL METHOD: ABNORMAL
EOSINOPHIL # BLD AUTO: 0.1 K/UL (ref 0–0.5)
EOSINOPHIL NFR BLD: 0.8 % (ref 0–8)
ERYTHROCYTE [DISTWIDTH] IN BLOOD BY AUTOMATED COUNT: 19 % (ref 11.5–14.5)
EST. GFR  (NO RACE VARIABLE): >60 ML/MIN/1.73 M^2
GLUCOSE SERPL-MCNC: 93 MG/DL (ref 70–110)
GRAM STN SPEC: NORMAL
GRAM STN SPEC: NORMAL
HCT VFR BLD AUTO: 28.6 % (ref 37–48.5)
HGB BLD-MCNC: 8.5 G/DL (ref 12–16)
IMM GRANULOCYTES # BLD AUTO: 0.05 K/UL (ref 0–0.04)
IMM GRANULOCYTES NFR BLD AUTO: 0.5 % (ref 0–0.5)
LYMPHOCYTES # BLD AUTO: 2 K/UL (ref 1–4.8)
LYMPHOCYTES NFR BLD: 18.4 % (ref 18–48)
MAGNESIUM SERPL-MCNC: 1.6 MG/DL (ref 1.6–2.6)
MCH RBC QN AUTO: 21.5 PG (ref 27–31)
MCHC RBC AUTO-ENTMCNC: 29.7 G/DL (ref 32–36)
MCV RBC AUTO: 72 FL (ref 82–98)
MONOCYTES # BLD AUTO: 0.7 K/UL (ref 0.3–1)
MONOCYTES NFR BLD: 6.1 % (ref 4–15)
NEUTROPHILS # BLD AUTO: 7.9 K/UL (ref 1.8–7.7)
NEUTROPHILS NFR BLD: 74 % (ref 38–73)
NRBC BLD-RTO: 0 /100 WBC
PHOSPHATE SERPL-MCNC: 4.4 MG/DL (ref 2.7–4.5)
PLATELET # BLD AUTO: 362 K/UL (ref 150–450)
PMV BLD AUTO: 9 FL (ref 9.2–12.9)
POTASSIUM SERPL-SCNC: 3.9 MMOL/L (ref 3.5–5.1)
PROT SERPL-MCNC: 6.7 G/DL (ref 6–8.4)
RBC # BLD AUTO: 3.95 M/UL (ref 4–5.4)
SODIUM SERPL-SCNC: 137 MMOL/L (ref 136–145)
WBC # BLD AUTO: 10.6 K/UL (ref 3.9–12.7)

## 2023-08-31 PROCEDURE — 84100 ASSAY OF PHOSPHORUS: CPT | Performed by: SURGERY

## 2023-08-31 PROCEDURE — 80053 COMPREHEN METABOLIC PANEL: CPT | Performed by: SURGERY

## 2023-08-31 PROCEDURE — 25000003 PHARM REV CODE 250: Performed by: SURGERY

## 2023-08-31 PROCEDURE — 83735 ASSAY OF MAGNESIUM: CPT | Performed by: SURGERY

## 2023-08-31 PROCEDURE — 85025 COMPLETE CBC W/AUTO DIFF WBC: CPT | Performed by: SURGERY

## 2023-08-31 PROCEDURE — 63600175 PHARM REV CODE 636 W HCPCS: Performed by: SURGERY

## 2023-08-31 PROCEDURE — 36415 COLL VENOUS BLD VENIPUNCTURE: CPT | Performed by: SURGERY

## 2023-08-31 RX ORDER — METRONIDAZOLE 500 MG/1
500 TABLET ORAL EVERY 12 HOURS
Qty: 24 TABLET | Refills: 0 | Status: CANCELLED | OUTPATIENT
Start: 2023-08-31 | End: 2023-09-12

## 2023-08-31 RX ORDER — AMOXICILLIN AND CLAVULANATE POTASSIUM 562.5; 437.5; 62.5 MG/1; MG/1; MG/1
2 TABLET, FILM COATED, EXTENDED RELEASE ORAL EVERY 12 HOURS
Qty: 48 TABLET | Refills: 0 | Status: SHIPPED | OUTPATIENT
Start: 2023-08-31 | End: 2023-09-12

## 2023-08-31 RX ORDER — AMOXICILLIN AND CLAVULANATE POTASSIUM 875; 125 MG/1; MG/1
1 TABLET, FILM COATED ORAL EVERY 12 HOURS
Qty: 10 TABLET | Refills: 0 | Status: CANCELLED | OUTPATIENT
Start: 2023-08-31 | End: 2023-09-05

## 2023-08-31 RX ORDER — OXYCODONE AND ACETAMINOPHEN 10; 325 MG/1; MG/1
1 TABLET ORAL EVERY 4 HOURS PRN
Qty: 16 TABLET | Refills: 0 | Status: SHIPPED | OUTPATIENT
Start: 2023-08-31 | End: 2023-09-03

## 2023-08-31 RX ORDER — ONDANSETRON 4 MG/1
8 TABLET, ORALLY DISINTEGRATING ORAL 2 TIMES DAILY
Qty: 40 TABLET | Refills: 0 | Status: SHIPPED | OUTPATIENT
Start: 2023-08-31 | End: 2023-09-10

## 2023-08-31 RX ORDER — DOXYCYCLINE 100 MG/1
100 CAPSULE ORAL EVERY 12 HOURS
Qty: 24 CAPSULE | Refills: 0 | Status: CANCELLED | OUTPATIENT
Start: 2023-08-31 | End: 2023-09-12

## 2023-08-31 RX ORDER — DOXYCYCLINE 100 MG/1
100 CAPSULE ORAL EVERY 12 HOURS
Status: DISCONTINUED | OUTPATIENT
Start: 2023-08-31 | End: 2023-08-31 | Stop reason: HOSPADM

## 2023-08-31 RX ADMIN — METRONIDAZOLE 500 MG: 5 INJECTION, SOLUTION INTRAVENOUS at 08:08

## 2023-08-31 RX ADMIN — OXYCODONE HYDROCHLORIDE AND ACETAMINOPHEN 1 TABLET: 10; 325 TABLET ORAL at 09:08

## 2023-08-31 RX ADMIN — METRONIDAZOLE 500 MG: 5 INJECTION, SOLUTION INTRAVENOUS at 12:08

## 2023-08-31 NOTE — PLAN OF CARE
Discharge orders and chart reviewed. No other discharge needs noted at this time. Pt is clear for discharge from case management. Pt is discharging to home.    Appt with Dr. Pope added to AVS       08/31/23 9828   Final Note   Assessment Type Final Discharge Note   Anticipated Discharge Disposition Home   What phone number can be called within the next 1-3 days to see how you are doing after discharge? [phone]   Hospital Resources/Appts/Education Provided Appointments scheduled and added to AVS   Post-Acute Status   Discharge Delays None known at this time

## 2023-08-31 NOTE — HOSPITAL COURSE
Patient is a 28 y.o. female w/pmhx of Crohn's colitis, recent  in 2023, Endometriosis, GERD who presented to the ED w/perianal abscess. She had leukocytosis of 16 and fever on admission. CT also revealed multi loculated fluid collection in the left pelvis concerning for tubo-ovarian abscess with involvement of the sigmoid vs superinfected necrotic ovarian vs uterine mass. Gen surg and gyn were consulted. She went to the OR on  for drainage of the perianal abscess. Gyn also evaluated patient. She is well known to their service and is known to have an enlarged ovarian cyst. Gyn recommended patient follow up with gyn onc Dr. Zazueta at [facility] for surgery on 23 as she was previously scheduled for. However, per patient, due to her medical complexity the surgery was canceled. GI was also consulted 2/2 her complex crohns history and did not recommend scope inpatient as she already had 2 this year. Gen surg recommended discharging patient and outpatient follow up. She will be discharged with an additional 10 day course of augmentin to treat for PID and perirectal abscess. She will follow up with Dr. Pope in clinic on . Instructed patient to still follow up with Dr. Zazueta in clinic. Patient states Dr. Zazueta's office will be calling her tomorrow to discuss follow-up.

## 2023-08-31 NOTE — DISCHARGE INSTRUCTIONS
You will continue Augmentin for 12 more days to treat your kanu-anal abscess and your pelvic inflammatory disease. You will follow up with Dr. Pope in clinic in 1 week. Please also follow up with your primary care doctor.

## 2023-08-31 NOTE — DISCHARGE SUMMARY
[facility] Medicine  Discharge Summary      Patient Name: Agnes Matos  MRN: [id number]  [facility]: [id number]  Patient Class: IP- Inpatient  Admission Date: 8/28/2023  Hospital Length of Stay: 2 days  Discharge Date and Time:  08/31/2023 6:49 PM  Attending Physician: No att. providers found   Discharging Provider: Hudson Gracia MD  Primary Care Provider: Rose Torres MD    Primary Care Team: Networked reference to record PCT     HPI:   Agnes Matos is a 28 y.o. female with a history as  has a past medical history of Anxiety, Cholestasis during pregnancy, Crohn's colitis, Depression, Endometriosis, GERD (gastroesophageal reflux disease), IBS (irritable bowel syndrome), and TMJ (dislocation of temporomandibular joint). who presented to the ED with a Abscess (Rectum)    Patient presents to the emergency room for evaluation of perianal abscess.  Patient reports she was seen pellet can not urgent care for anal abscess that was draining blood and pus.  At urgent care she was noted to have a temp of a 102° provider reported he could not excise the abscess and recommended that she go to the emergency room given her elevated temp.     Lab and imaging obtained and reviewed.  CBC completed and shows WBCs 16.27 H/H 9.7/30.7 MCV 70 MCH 21.9 MCHC 31.6 RDW is 19.3 platelets 457 MPV 8.7 g% 81.2 lymphocytes 13.2.  Chemistry profile shows sodium 134 alk-phos 142 AST/ALT 51/62.  Lactic acid within normal range.  UA with hazy color 3+ leukocytes greater than 100 WBCs 9 hyaline casts.  On admit, temp 99.9° with T-max of 102°, , RR 20, /73, O2 sats 96% on room air.       CT abdomen/pelvis  IMPRESSION:  1.  Multi loculated fluid collection with peripheral enhancement containing gas in the left pelvis measuring approximately 9.1 x 6.7 cm which closely abuts the left ovary and left uterine fundus and probably arises from the left ovary. This left pelvis/adnexal complex fluid collection closely abuts the  sigmoid with focal area of wall thickening of the sigmoid. Overall findings are concerning for tubo-ovarian abscess with involvement of the sigmoid. Fistula formation is difficult to exclude. Alternatively, differential diagnosis includes a superinfected necrotic ovarian or uterine mass. Recommend surgery consultation.  2.  Hepatomegaly. Splenomegaly.  3.  Hypodense lesion measuring 1.1 cm in the posterior right hepatic lobe appears unchanged from 2023 possibly a hemangioma. Consider follow-up MRI.  4.  Bilateral nephrolithiasis without hydronephrosis.    Per ED provider patient with perianal abscess.  On admit fever resolved. Noted to have elevated white count. CT abdomen pelvis was completed (as above).  General surgery was consulted with recommendations to consult OB/GYN given CT results and initiate IV ABX for possible intervention.     Patient reports she recently had a baby in 2023 and was aware of the mass represented in the CT findings and states it was supposed to be removed during the time of delivery however it was embedded into her uterus so procedure was postponed.  She further states she was seen at [facility] and was schedule to have mass removed 2023, but, was told that the mass was seen on their imaging. Patient is aware of the mass, however, wasn't aware that it was necrotic.         Procedure(s) (LRB):  INCISION, ABSCESS, PERIRECTAL (N/A)      Hospital Course:   Patient is a 28 y.o. female w/pmhx of Crohn's colitis, recent  in 2023, Endometriosis, GERD who presented to the ED w/perianal abscess. She had leukocytosis of 16 and fever on admission. CT also revealed multi loculated fluid collection in the left pelvis concerning for tubo-ovarian abscess with involvement of the sigmoid vs superinfected necrotic ovarian vs uterine mass. Gen surg and gyn were consulted. She went to the OR on  for drainage of the perianal abscess. Gyn also evaluated patient. She is well known  to their service and is known to have an enlarged ovarian cyst. Gyn recommended patient follow up with gyn onc Dr. Zazueta at [facility] for surgery on 9/7/23 as she was previously scheduled for. However, per patient, due to her medical complexity the surgery was canceled. GI was also consulted 2/2 her complex crohns history and did not recommend scope inpatient as she already had 2 this year. Gen surg recommended discharging patient and outpatient follow up. She will be discharged with an additional 10 day course of augmentin to treat for PID and perirectal abscess. She will follow up with Dr. Pope in clinic on 9/6. Instructed patient to still follow up with Dr. Zazueta in clinic. Patient states Dr. Zazueta's office will be calling her tomorrow to discuss follow-up.           Goals of Care Treatment Preferences:  Code Status: Full Code      Consults:   Consults (From admission, onward)        Status Ordering Provider     Inpatient consult to OB/GYN  Once        Provider:  Lisha Mcdonough MD    Completed DOMINICK DUNAWAY     Inpatient consult to General surgery  Once        Provider:  Darleen Pope MD    Completed DOMINICK DUNAWAY          No new Assessment & Plan notes have been filed under this hospital service since the last note was generated.  Service: Hospital Medicine    Final Active Diagnoses:    Diagnosis Date Noted POA    PRINCIPAL PROBLEM:  Perianal abscess [K61.0] 08/29/2023 Yes    Tubo-ovarian abscess [N70.93] 08/29/2023 Yes    Uterine mass [N85.8] 08/29/2023 Yes    Crohn's disease, unspecified, without complications [K50.90] 06/07/2022 Yes    Endometriosis, unspecified [N80.9] 06/07/2022 Yes      Problems Resolved During this Admission:       Discharged Condition: good    Disposition: Home or Self Care    Follow Up:   Follow-up Information     Darleen Pope MD. Go on 9/6/2023.    Specialties: General Surgery, Bariatrics, Surgery  Why: follow up with Dr. Pope as scheduled  Contact  information:  [address]  [address]  [facility] [address]  [phone]                       Patient Instructions:   No discharge procedures on file.    Significant Diagnostic Studies: Labs:   BMP:   Recent Labs   Lab 08/30/23  0513 08/31/23  0600   GLU 91 93   * 137   K 4.0 3.9    104   CO2 26 27   BUN 10 10   CREATININE 0.8 0.7   CALCIUM 8.5* 8.6*   MG 1.6 1.6    and CBC   Recent Labs   Lab 08/30/23  0513 08/31/23  0600   WBC 12.06 10.60   HGB 8.4* 8.5*   HCT 27.7* 28.6*    362       Pending Diagnostic Studies:     None         Medications:  Reconciled Home Medications:      Medication List      START taking these medications    amoxicillin-clavulanate 1,000-62.5 mg 1,000-62.5 mg per tablet  Commonly known as: AUGMENTIN XR  Take 2 tablets by mouth every 12 (twelve) hours. for 12 days     ondansetron 4 MG Tbdl  Commonly known as: ZOFRAN-ODT  Take 2 tablets (8 mg total) by mouth 2 (two) times daily. for 10 days     oxyCODONE-acetaminophen  mg per tablet  Commonly known as: PERCOCET  Take 1 tablet by mouth every 4 (four) hours as needed for Pain.  Replaces: oxyCODONE-acetaminophen 5-325 mg per tablet        STOP taking these medications    oxyCODONE-acetaminophen 5-325 mg per tablet  Commonly known as: PERCOCET  Replaced by: oxyCODONE-acetaminophen  mg per tablet            Indwelling Lines/Drains at time of discharge:   Lines/Drains/Airways     None               Physical Exam:  Gen: ambulating in room   : kanu-rectal packing loose and minimally soaked       Time spent on the discharge of patient: 40 minutes         Hudson Gracia MD  Department of Hospital Medicine  [facility]

## 2023-09-01 ENCOUNTER — PATIENT OUTREACH (OUTPATIENT)
Dept: ADMINISTRATIVE | Facility: CLINIC | Age: 28
End: 2023-09-01
Payer: OTHER GOVERNMENT

## 2023-09-01 LAB
CHLAMYDIA, AMPLIFIED DNA: NEGATIVE
N GONORRHOEAE, AMPLIFIED DNA: NEGATIVE

## 2023-09-01 NOTE — PROGRESS NOTES
C3 nurse spoke with Agnes Matos  for a TCC post hospital discharge follow up call. The patient does not have a scheduled HOSFU appointment scheduled with Rose Torres MD. The patient declined assistance with scheduling HOSFU at this time.

## 2023-09-02 LAB
BACTERIA BLD CULT: NORMAL
BACTERIA BLD CULT: NORMAL
BACTERIA SPEC AEROBE CULT: ABNORMAL
BACTERIA SPEC AEROBE CULT: NORMAL
BACTERIA SPEC ANAEROBE CULT: NORMAL

## 2023-09-06 ENCOUNTER — OFFICE VISIT (OUTPATIENT)
Dept: BARIATRICS | Facility: CLINIC | Age: 28
End: 2023-09-06
Payer: OTHER GOVERNMENT

## 2023-09-06 VITALS
TEMPERATURE: 98 F | WEIGHT: 172.19 LBS | SYSTOLIC BLOOD PRESSURE: 97 MMHG | HEIGHT: 63 IN | HEART RATE: 83 BPM | DIASTOLIC BLOOD PRESSURE: 54 MMHG | RESPIRATION RATE: 16 BRPM | BODY MASS INDEX: 30.51 KG/M2

## 2023-09-06 DIAGNOSIS — K61.0 PERIANAL ABSCESS: Primary | ICD-10-CM

## 2023-09-06 DIAGNOSIS — N83.8 OVARIAN MASS: ICD-10-CM

## 2023-09-06 PROCEDURE — 99213 OFFICE O/P EST LOW 20 MIN: CPT | Mod: PBBFAC,PN | Performed by: SURGERY

## 2023-09-06 PROCEDURE — 99213 OFFICE O/P EST LOW 20 MIN: CPT | Mod: S$PBB,24,, | Performed by: SURGERY

## 2023-09-06 PROCEDURE — 99213 PR OFFICE/OUTPT VISIT, EST, LEVL III, 20-29 MIN: ICD-10-PCS | Mod: S$PBB,24,, | Performed by: SURGERY

## 2023-09-06 PROCEDURE — 99999 PR PBB SHADOW E&M-EST. PATIENT-LVL III: ICD-10-PCS | Mod: PBBFAC,,, | Performed by: SURGERY

## 2023-09-06 PROCEDURE — 99999 PR PBB SHADOW E&M-EST. PATIENT-LVL III: CPT | Mod: PBBFAC,,, | Performed by: SURGERY

## 2023-09-18 PROBLEM — Z37.9 NORMAL LABOR: Status: RESOLVED | Noted: 2023-06-18 | Resolved: 2023-09-18

## 2023-09-19 ENCOUNTER — TELEPHONE (OUTPATIENT)
Dept: BARIATRICS | Facility: CLINIC | Age: 28
End: 2023-09-19
Payer: OTHER GOVERNMENT

## 2023-09-19 NOTE — TELEPHONE ENCOUNTER
Returned call to pt. No answer, LM.     ----- Message from Caitlin Ness sent at 9/19/2023  1:50 PM CDT -----  Contact: elf  Type: Sooner Appointment Request        Caller is requesting a sooner appointment. Caller declined first available appointment listed below. Caller will not accept being placed on the waitlist and is requesting a message be sent to doctor.        Name of Caller: patient   Best Call Back Number: [phone]  Additional Information: Pt states she needs a ret call back. Thanks

## 2023-10-02 LAB
FUNGUS SPEC CULT: NORMAL
FUNGUS SPEC CULT: NORMAL

## 2023-10-14 LAB

## 2023-11-20 ENCOUNTER — TELEPHONE (OUTPATIENT)
Dept: FAMILY MEDICINE | Facility: CLINIC | Age: 28
End: 2023-11-20

## 2023-11-20 NOTE — TELEPHONE ENCOUNTER
----- Message from Rina Rivera sent at 11/20/2023  9:03 AM CST -----  Pt had to have emergency surgery at [place] on 11/8 and needs a f/u. She had 2 abscess in her abdomen. Has a wound vac machine. 6 weeks of iv antibiotics. She has home health at the moment.   [phone]

## 2023-11-21 ENCOUNTER — OFFICE VISIT (OUTPATIENT)
Dept: FAMILY MEDICINE | Facility: CLINIC | Age: 28
End: 2023-11-21
Attending: FAMILY MEDICINE
Payer: OTHER GOVERNMENT

## 2023-11-21 VITALS
WEIGHT: 167 LBS | HEART RATE: 75 BPM | DIASTOLIC BLOOD PRESSURE: 92 MMHG | OXYGEN SATURATION: 99 % | SYSTOLIC BLOOD PRESSURE: 132 MMHG | HEIGHT: 63 IN | BODY MASS INDEX: 29.59 KG/M2

## 2023-11-21 DIAGNOSIS — F06.4 ANXIETY DISORDER DUE TO MEDICAL CONDITION: Primary | ICD-10-CM

## 2023-11-21 DIAGNOSIS — Z46.89 ENCOUNTER FOR MANAGEMENT OF WOUND VAC: ICD-10-CM

## 2023-11-21 DIAGNOSIS — N20.0 KIDNEY STONES: ICD-10-CM

## 2023-11-21 DIAGNOSIS — Z90.49 HISTORY OF RESECTION OF SMALL BOWEL: ICD-10-CM

## 2023-11-21 DIAGNOSIS — N70.93 TUBO-OVARIAN ABSCESS: ICD-10-CM

## 2023-11-21 DIAGNOSIS — K43.9 HERNIA OF ABDOMINAL WALL: ICD-10-CM

## 2023-11-21 DIAGNOSIS — K50.919 CROHN'S DISEASE OF INTESTINE WITH COMPLICATION: ICD-10-CM

## 2023-11-21 DIAGNOSIS — S31.109S OPEN WOUND OF ABDOMINAL WALL, SEQUELA: ICD-10-CM

## 2023-11-21 DIAGNOSIS — K56.609 SMALL BOWEL OBSTRUCTION: ICD-10-CM

## 2023-11-21 PROBLEM — Z3A.11 11 WEEKS GESTATION OF PREGNANCY: Status: RESOLVED | Noted: 2022-12-15 | Resolved: 2023-11-21

## 2023-11-21 PROBLEM — O26.832 KIDNEY STONE COMPLICATING PREGNANCY, SECOND TRIMESTER: Status: RESOLVED | Noted: 2023-01-29 | Resolved: 2023-11-21

## 2023-11-21 PROBLEM — O34.219 PREVIOUS CESAREAN DELIVERY AFFECTING PREGNANCY: Status: RESOLVED | Noted: 2023-06-18 | Resolved: 2023-11-21

## 2023-11-21 PROBLEM — K65.9 PERITONITIS: Status: RESOLVED | Noted: 2022-12-23 | Resolved: 2023-11-21

## 2023-11-21 PROBLEM — Z3A.12 12 WEEKS GESTATION OF PREGNANCY: Status: RESOLVED | Noted: 2022-12-20 | Resolved: 2023-11-21

## 2023-11-21 PROBLEM — R10.0 ACUTE ABDOMEN: Status: RESOLVED | Noted: 2022-12-20 | Resolved: 2023-11-21

## 2023-11-21 PROBLEM — O99.891 KIDNEY STONE COMPLICATING PREGNANCY, SECOND TRIMESTER: Status: RESOLVED | Noted: 2023-01-29 | Resolved: 2023-11-21

## 2023-11-21 PROBLEM — N83.202 LEFT OVARIAN CYST: Status: RESOLVED | Noted: 2022-12-15 | Resolved: 2023-11-21

## 2023-11-21 PROCEDURE — 99214 OFFICE O/P EST MOD 30 MIN: CPT | Mod: S$GLB,,, | Performed by: FAMILY MEDICINE

## 2023-11-21 PROCEDURE — 99214 PR OFFICE/OUTPT VISIT, EST, LEVL IV, 30-39 MIN: ICD-10-PCS | Mod: S$GLB,,, | Performed by: FAMILY MEDICINE

## 2023-11-21 RX ORDER — PANTOPRAZOLE SODIUM 40 MG/1
40 TABLET, DELAYED RELEASE ORAL 2 TIMES DAILY
COMMUNITY

## 2023-11-21 RX ORDER — ACETAMINOPHEN 500 MG
1000 TABLET ORAL EVERY 8 HOURS
COMMUNITY
Start: 2023-10-19

## 2023-11-21 RX ORDER — ONDANSETRON 4 MG/1
8 TABLET, FILM COATED ORAL EVERY 4 HOURS PRN
COMMUNITY

## 2023-11-21 RX ORDER — GABAPENTIN 300 MG/1
300 CAPSULE ORAL DAILY
COMMUNITY

## 2023-11-21 RX ORDER — OXYCODONE AND ACETAMINOPHEN 5; 325 MG/1; MG/1
1 TABLET ORAL EVERY 6 HOURS PRN
COMMUNITY

## 2023-11-21 RX ORDER — ALPRAZOLAM 0.25 MG/1
TABLET ORAL
Qty: 90 TABLET | Refills: 0 | Status: SHIPPED | OUTPATIENT
Start: 2023-11-21

## 2023-11-21 RX ORDER — TACROLIMUS 1 MG/1
1 CAPSULE ORAL 2 TIMES DAILY
COMMUNITY

## 2023-11-21 RX ORDER — TAZOBACTAM SODIUM AND PIPERACILLIN SODIUM 500; 4 MG/100ML; G/100ML
4.5 INJECTION, SOLUTION INTRAVENOUS EVERY 8 HOURS
COMMUNITY

## 2023-11-21 RX ORDER — POLYETHYLENE GLYCOL 3350 17 G/17G
17 POWDER, FOR SOLUTION ORAL DAILY
COMMUNITY

## 2023-11-21 NOTE — PROGRESS NOTES
SUBJECTIVE:    Patient ID: Agnes Matos is a 28 y.o. female.    Chief Complaint: HFU    Patient history of Crohn's disease and partial small bowel removal secondary to Crohn's colitis presents for hospital follow-up.  The patient has had a complicated health course over the past year.  She was diagnosed with kidney stones in 2022.  She was about 3 months pregnant at this time with her 3rd child.  She underwent ureteral stone extraction during her 2nd trimester.  She tolerated this without incident.  She is in delivered her baby by  section in 2023.  This was her 3rd  section.    The patient had abdominal pain , rectal pain and fever and 2023 and presented to the emergency room.  She was found to have a perianal abscess that required incision and drainage in the emergency room.  She was placed on antibiotic therapy.  A CT of the abdomen pelvis performed during that time did show a fluid filled lesion in her left pelvic region consistent with tubo-ovarian cyst.  Was felt that the perianal abscess was secondary to a Crohn's disease.  She was also referred to Gynecology for the ovarian lesion.  Gyn required that the anal lesion was cleared of infection prior to performing the procedure for the ovarian lesion.      Patient was hospitalized earlier this month for laparoscopic resection of tubo-ovarian abscess and abdominal wall hernia repair.  Unfortunately a few days after she was discharged for this procedure she developed fever and an acute abdomen.  She was found to have abdominal wall abscess, peritonitis and partial small-bowel obstruction.  An open procedure was performed at [facility].  She has since been discharged home with home health and has surgical follow-up.  She is also being followed at [facility] for wound care.     She is currently receiving IV antibiotics and has her dressings changed every 3 days.  She reports discomfort and skin irritation from a wound  changes.  She has had problems with blood draws secondary to hydration status.  She has also had multiple sticks while in hospital.  She has significant anxiety and discomfort with her ongoing labs and dressing changes.  She also reports a PTSD like sensation whenever having to present to a doctor's office.    She does fine calm this caring for her  but can no longer breastfeed.  She is also working to help keep her other 2 young children is calm as she can.            Past Medical History:   Diagnosis Date    Anxiety     Cholestasis during pregnancy     Crohn's colitis     Depression     Endometriosis     GERD (gastroesophageal reflux disease)     IBS (irritable bowel syndrome)     Kidney stone complicating pregnancy, second trimester 2023    Left ovarian cyst 12/15/2022    TMJ (dislocation of temporomandibular joint)      Past Surgical History:   Procedure Laterality Date    ABDOMINAL HERNIA REPAIR  2023    ABDOMINAL WALL SURGERY  2023    for post operative abscess with partial SBO. left open to heal with wound vac     SECTION  17 &     Two births     SECTION N/A 2023    Procedure:  SECTION;  Surgeon: Lisha Mcdonough MD;  Location: [facility] L&D;  Service: OB/GYN;  Laterality: N/A;    COLON SURGERY  21    A small part along with tbe small bowel    COLONOSCOPY N/A 2022    Procedure: COLONOSCOPY;  Surgeon: Gregorio Bourne MD;  [facility]: [facility];  Service: Gastroenterology;  Laterality: N/A;    CYSTOSCOPY W/ URETERAL STENT PLACEMENT Right 02/10/2023    Procedure: CYSTOSCOPY, WITH URETERAL STENT INSERTION;  Surgeon: Marry Erickson MD;  Location: [facility] OR;  Service: Urology;  Laterality: Right;    DIAGNOSTIC LAPAROSCOPY Left 2022    Procedure: LAPAROSCOPY, DIAGNOSTIC- SAMPLING OF PERITONEAL FLUID;  Surgeon: Yajaira Siegel MD;  Location: [facility] OR;  Service: OB/GYN;  Laterality: Left;    INCISION OF PERIRECTAL ABSCESS N/A 2023     Procedure: INCISION, ABSCESS, PERIRECTAL;  Surgeon: Darleen Pope MD;  Location: [facility] OR;  Service: General;  Laterality: N/A;    OVARY SURGERY Left 11/08/2023    tubo-ovarian abscess removal    presacral neurectomy      2020    SMALL INTESTINE SURGERY  09/21/2021    partial with partial large bowel    SURGICAL REMOVAL OF ENDOMETRIOSIS      TONSILLECTOMY  2006    TUBAL LIGATION  10-26-19    After c-secrtion    URETEROSCOPIC REMOVAL OF URETERIC CALCULUS Right 02/10/2023    Procedure: REMOVAL, CALCULUS, URETER, URETEROSCOPIC;  Surgeon: Marry Erickson MD;  Location: [facility] OR;  Service: Urology;  Laterality: Right;     History reviewed. No pertinent family history.    Marital Status:   Alcohol History:  reports that she does not currently use alcohol.  Tobacco History:  reports that she has never smoked. She has never used smokeless tobacco.  Drug History:  reports no history of drug use.    Review of patient's allergies indicates:   Allergen Reactions    Methotrexate Swelling     Hands face      Stelara [ustekinumab] Hives    Vedolizumab Hives    Adalimumab Rash, Itching and Hives     joint ache      Renflexis [infliximab-abda] Rash     Other reaction(s): Rash or Itch, Other: joint ache, Rash or Itch, Other: joint ache       Current Outpatient Medications:     acetaminophen (TYLENOL) 500 MG tablet, Take 1,000 mg by mouth every 8 (eight) hours., Disp: , Rfl:     gabapentin (NEURONTIN) 300 MG capsule, Take 300 mg by mouth Daily., Disp: , Rfl:     ondansetron (ZOFRAN) 4 MG tablet, Take 8 mg by mouth every 4 (four) hours as needed for Nausea., Disp: , Rfl:     oxyCODONE-acetaminophen (PERCOCET) 5-325 mg per tablet, Take 1 tablet by mouth every 6 (six) hours as needed for Pain., Disp: , Rfl:     pantoprazole (PROTONIX) 40 MG tablet, Take 40 mg by mouth 2 (two) times daily., Disp: , Rfl:     piperacillin-tazobactam (ZOSYN IN DEXTROSE, ISO-OSM,) IVPB, Inject 4.5 g into the vein every 8 (eight) hours.,  "Disp: , Rfl:     polyethylene glycol (GLYCOLAX) 17 gram PwPk, Take 17 g by mouth once daily., Disp: , Rfl:     tacrolimus (PROGRAF) 1 MG Cap, Take 1 capsule by mouth 2 (two) times a day., Disp: , Rfl:     ALPRAZolam (XANAX) 0.25 MG tablet, Take one tablet 30 minutes prior to medical encounters, Disp: 90 tablet, Rfl: 0    Bacillus coagulans 250 million cell Chew, Take by mouth., Disp: , Rfl:     Review of Systems   Constitutional:  Positive for fatigue. Negative for activity change and unexpected weight change.   HENT:  Negative for hearing loss, postnasal drip, sinus pressure, sore throat and voice change.    Eyes:  Negative for photophobia and visual disturbance.   Respiratory:  Negative for cough, shortness of breath and wheezing.    Cardiovascular:  Negative for chest pain and palpitations.   Gastrointestinal:  Positive for diarrhea. Negative for constipation and nausea.   Genitourinary:  Negative for difficulty urinating, frequency, hematuria and urgency.   Musculoskeletal:  Negative for arthralgias and back pain.   Skin:  Positive for wound. Negative for rash.   Neurological:  Negative for weakness, light-headedness and headaches.   Hematological:  Negative for adenopathy. Does not bruise/bleed easily.   Psychiatric/Behavioral:  The patient is nervous/anxious.           Objective:      Vitals:    11/21/23 1634   BP: (!) 132/92   Pulse: 75   SpO2: 99%   Weight: 75.8 kg (167 lb)   Height: 5' 3" (1.6 m)     Physical Exam  Constitutional:       General: She is not in acute distress.  Abdominal:      General: There is distension.      Tenderness: There is generalized abdominal tenderness.       Neurological:      Mental Status: She is alert.   Psychiatric:         Mood and Affect: Mood is anxious.         Speech: Speech normal.           Assessment:       1. Anxiety disorder due to medical condition    2. Tubo-ovarian abscess    3. Open wound of abdominal wall, sequela    4. Small bowel obstruction    5. Hernia of " abdominal wall    6. Crohn's disease of intestine with complication    7. Kidney stones    8. History of resection of small bowel    9. Encounter for management of wound VAC         Plan:       Anxiety disorder due to medical condition  -     ALPRAZolam (XANAX) 0.25 MG tablet; Take one tablet 30 minutes prior to medical encounters  Dispense: 90 tablet; Refill: 0  Medication provided to help with acute anxiety symptoms around her continued medical care.  She is to use prior to visits, testing and wound care changes    Tubo-ovarian abscess  Left-sided.  Extracted    Open wound of abdominal wall, sequela  Has wound VAC and wound care.  Continue IV antibiotics    Small bowel obstruction  Resolved in his patient with previous history of partial small-bowel resection    Hernia of abdominal wall  Initially repaired.  Wound VAC now in place    Crohn's disease of intestine with complication  She is due colonoscopy for her routine evaluation soon.  Instructed to defer until other issues completely resolved    Kidney stones  We will continue to follow with Urology.  Nonobstructing stones were noted to still be present    History of resection of small bowel    Encounter for management of wound VAC      Follow up if symptoms worsen or fail to improve.

## 2023-11-22 ENCOUNTER — TELEPHONE (OUTPATIENT)
Dept: FAMILY MEDICINE | Facility: CLINIC | Age: 28
End: 2023-11-22

## 2023-11-22 DIAGNOSIS — Z51.89 VISIT FOR WOUND CARE: Primary | ICD-10-CM

## 2023-11-22 NOTE — TELEPHONE ENCOUNTER
----- Message from Amada Puri sent at 11/22/2023  2:12 PM CST -----  Britney at wound care called and is asking for a  referral/order to wound care for this patient.  She has a appointment this Friday.   [phone]

## 2023-11-22 NOTE — TELEPHONE ENCOUNTER
Spoke with Britney. States they need a referral for pt insurance. Referral for [facility]. Pt has appt this Friday. To Dr. Torres to sign and to Jessica for Auth.

## 2023-11-27 ENCOUNTER — LAB VISIT (OUTPATIENT)
Dept: LAB | Facility: HOSPITAL | Age: 28
End: 2023-11-27
Attending: INTERNAL MEDICINE
Payer: OTHER GOVERNMENT

## 2023-11-27 DIAGNOSIS — K43.2 RECURRENT VENTRAL HERNIA: ICD-10-CM

## 2023-11-27 DIAGNOSIS — K50.919 CROHN'S DISEASE WITH COMPLICATION: Primary | ICD-10-CM

## 2023-11-27 PROCEDURE — 80053 COMPREHEN METABOLIC PANEL: CPT | Performed by: INTERNAL MEDICINE

## 2023-11-27 PROCEDURE — 86140 C-REACTIVE PROTEIN: CPT | Performed by: INTERNAL MEDICINE

## 2023-11-28 LAB
ALBUMIN SERPL BCP-MCNC: 3.8 G/DL (ref 3.5–5.2)
ALP SERPL-CCNC: 234 U/L (ref 55–135)
ALT SERPL W/O P-5'-P-CCNC: 49 U/L (ref 10–44)
ANION GAP SERPL CALC-SCNC: 15 MMOL/L (ref 8–16)
AST SERPL-CCNC: 80 U/L (ref 10–40)
BILIRUB SERPL-MCNC: 0.5 MG/DL (ref 0.1–1)
BUN SERPL-MCNC: 12 MG/DL (ref 6–20)
CALCIUM SERPL-MCNC: 9.8 MG/DL (ref 8.7–10.5)
CHLORIDE SERPL-SCNC: 103 MMOL/L (ref 95–110)
CO2 SERPL-SCNC: 22 MMOL/L (ref 23–29)
CREAT SERPL-MCNC: 0.8 MG/DL (ref 0.5–1.4)
CRP SERPL-MCNC: 10.8 MG/L (ref 0–8.2)
EST. GFR  (NO RACE VARIABLE): >60 ML/MIN/1.73 M^2
GLUCOSE SERPL-MCNC: 59 MG/DL (ref 70–110)
POTASSIUM SERPL-SCNC: 4.6 MMOL/L (ref 3.5–5.1)
PROT SERPL-MCNC: 8.8 G/DL (ref 6–8.4)
SODIUM SERPL-SCNC: 140 MMOL/L (ref 136–145)

## 2023-12-11 ENCOUNTER — LAB VISIT (OUTPATIENT)
Dept: LAB | Facility: HOSPITAL | Age: 28
End: 2023-12-11
Attending: INTERNAL MEDICINE
Payer: OTHER GOVERNMENT

## 2023-12-11 DIAGNOSIS — K65.1 PERITONEAL ABSCESS: Primary | ICD-10-CM

## 2023-12-11 DIAGNOSIS — K43.2 RECURRENT VENTRAL HERNIA: ICD-10-CM

## 2023-12-11 LAB
ALBUMIN SERPL BCP-MCNC: 3.4 G/DL (ref 3.5–5.2)
ALP SERPL-CCNC: 144 U/L (ref 55–135)
ALT SERPL W/O P-5'-P-CCNC: 24 U/L (ref 10–44)
ANION GAP SERPL CALC-SCNC: 9 MMOL/L (ref 8–16)
AST SERPL-CCNC: 31 U/L (ref 10–40)
BASOPHILS # BLD AUTO: 0.01 K/UL (ref 0–0.2)
BASOPHILS NFR BLD: 0.2 % (ref 0–1.9)
BILIRUB SERPL-MCNC: 0.7 MG/DL (ref 0.1–1)
BUN SERPL-MCNC: 11 MG/DL (ref 6–20)
CALCIUM SERPL-MCNC: 8.7 MG/DL (ref 8.7–10.5)
CHLORIDE SERPL-SCNC: 107 MMOL/L (ref 95–110)
CO2 SERPL-SCNC: 21 MMOL/L (ref 23–29)
CREAT SERPL-MCNC: 0.7 MG/DL (ref 0.5–1.4)
CRP SERPL-MCNC: 12 MG/L (ref 0–8.2)
DIFFERENTIAL METHOD: ABNORMAL
EOSINOPHIL # BLD AUTO: 0.2 K/UL (ref 0–0.5)
EOSINOPHIL NFR BLD: 4.7 % (ref 0–8)
ERYTHROCYTE [DISTWIDTH] IN BLOOD BY AUTOMATED COUNT: 16.7 % (ref 11.5–14.5)
ERYTHROCYTE [SEDIMENTATION RATE] IN BLOOD BY WESTERGREN METHOD: 36 MM/HR (ref 0–20)
EST. GFR  (NO RACE VARIABLE): >60 ML/MIN/1.73 M^2
GLUCOSE SERPL-MCNC: 66 MG/DL (ref 70–110)
HCT VFR BLD AUTO: 31 % (ref 37–48.5)
HGB BLD-MCNC: 9.8 G/DL (ref 12–16)
IMM GRANULOCYTES # BLD AUTO: 0 K/UL (ref 0–0.04)
IMM GRANULOCYTES NFR BLD AUTO: 0 % (ref 0–0.5)
LYMPHOCYTES # BLD AUTO: 1.1 K/UL (ref 1–4.8)
LYMPHOCYTES NFR BLD: 25 % (ref 18–48)
MCH RBC QN AUTO: 24.3 PG (ref 27–31)
MCHC RBC AUTO-ENTMCNC: 31.6 G/DL (ref 32–36)
MCV RBC AUTO: 77 FL (ref 82–98)
MONOCYTES # BLD AUTO: 0.6 K/UL (ref 0.3–1)
MONOCYTES NFR BLD: 14.1 % (ref 4–15)
NEUTROPHILS # BLD AUTO: 2.5 K/UL (ref 1.8–7.7)
NEUTROPHILS NFR BLD: 56 % (ref 38–73)
NRBC BLD-RTO: 0 /100 WBC
PLATELET # BLD AUTO: 284 K/UL (ref 150–450)
PMV BLD AUTO: 9.8 FL (ref 9.2–12.9)
POTASSIUM SERPL-SCNC: 3.6 MMOL/L (ref 3.5–5.1)
PROT SERPL-MCNC: 7.1 G/DL (ref 6–8.4)
RBC # BLD AUTO: 4.03 M/UL (ref 4–5.4)
SODIUM SERPL-SCNC: 137 MMOL/L (ref 136–145)
WBC # BLD AUTO: 4.48 K/UL (ref 3.9–12.7)

## 2023-12-11 PROCEDURE — 86140 C-REACTIVE PROTEIN: CPT | Performed by: INTERNAL MEDICINE

## 2023-12-11 PROCEDURE — 85651 RBC SED RATE NONAUTOMATED: CPT | Mod: PO | Performed by: INTERNAL MEDICINE

## 2023-12-11 PROCEDURE — 85025 COMPLETE CBC W/AUTO DIFF WBC: CPT | Performed by: INTERNAL MEDICINE

## 2023-12-11 PROCEDURE — 80053 COMPREHEN METABOLIC PANEL: CPT | Performed by: INTERNAL MEDICINE

## 2024-04-23 ENCOUNTER — TELEPHONE (OUTPATIENT)
Dept: FAMILY MEDICINE | Facility: CLINIC | Age: 29
End: 2024-04-23

## 2024-04-23 ENCOUNTER — HOSPITAL ENCOUNTER (EMERGENCY)
Facility: HOSPITAL | Age: 29
Discharge: HOME | End: 2024-04-23
Attending: EMERGENCY MEDICINE
Payer: OTHER GOVERNMENT

## 2024-04-23 VITALS
WEIGHT: 167 LBS | HEART RATE: 69 BPM | RESPIRATION RATE: 18 BRPM | OXYGEN SATURATION: 100 % | BODY MASS INDEX: 29.59 KG/M2 | DIASTOLIC BLOOD PRESSURE: 60 MMHG | TEMPERATURE: 99 F | SYSTOLIC BLOOD PRESSURE: 95 MMHG | HEIGHT: 63 IN

## 2024-04-23 DIAGNOSIS — L03.317 CELLULITIS OF BUTTOCK: Primary | ICD-10-CM

## 2024-04-23 PROCEDURE — 99283 EMERGENCY DEPT VISIT LOW MDM: CPT

## 2024-04-23 RX ORDER — SULFAMETHOXAZOLE AND TRIMETHOPRIM 800; 160 MG/1; MG/1
1 TABLET ORAL 2 TIMES DAILY
Qty: 14 TABLET | Refills: 0 | Status: SHIPPED | OUTPATIENT
Start: 2024-04-23 | End: 2024-04-30

## 2024-04-23 NOTE — TELEPHONE ENCOUNTER
Per ER. Not last year pt had to have it drained and was in observation. Called pt advised that she would need to go to the ER or UC. Pt states her GYN advised ER o is that where she would go. Advised yes we are recommending Er. Pt voiced understanding.

## 2024-04-23 NOTE — FIRST PROVIDER EVALUATION
Emergency Department TeleTriage Encounter Note      CHIEF COMPLAINT    Chief Complaint   Patient presents with    Abscess     Perianal abscess x 1 month on R side and one forming on left. Pt had sx for previous one in 08/23. Pt is requesting no rectal exam.        VITAL SIGNS   Initial Vitals [04/23/24 1803]   BP Pulse Resp Temp SpO2   114/89 82 18 98.6 °F (37 °C) 100 %      MAP       --            ALLERGIES    Review of patient's allergies indicates:   Allergen Reactions    Methotrexate Swelling     Hands face      Stelara [ustekinumab] Hives    Vedolizumab Hives    Adalimumab Rash, Itching and Hives     joint ache      Renflexis [infliximab-abda] Rash     Other reaction(s): Rash or Itch, Other: joint ache, Rash or Itch, Other: joint ache       PROVIDER TRIAGE NOTE  Patient presents with complaint of perianal abscess for one month. States has been trying to see PCP for general surgery referral but they said she needed to come to ED. States no fever or other symptoms. Had same thing happen in the past.      Phy:   Constitutional: well nourished, well developed, appearing stated age, NAD        Initial orders will be placed and care will be transferred to an alternate provider when patient is roomed for a full evaluation. Any additional orders and the final disposition will be determined by that provider.        ORDERS  Labs Reviewed - No data to display    ED Orders (720h ago, onward)      None              Virtual Visit Note: The provider triage portion of this emergency department evaluation and documentation was performed via [employer], a HIPAA-compliant telemedicine application, in concert with a tele-presenter in the room. A face to face patient evaluation with one of my colleagues will occur once the patient is placed in an emergency department room.      DISCLAIMER: This note was prepared with [employer] voice recognition transcription software. Garbled syntax, mangled pronouns, and other bizarre  constructions may be attributed to that software system.

## 2024-04-23 NOTE — TELEPHONE ENCOUNTER
----- Message from Heidi Ballard sent at 4/23/2024 10:35 AM CDT -----  The patient wants to be seen today or needs some advice. She has  a Perianal Abscess  its painful. She had this last year and went to the ER. She is not running fever. She just wants Dr. Torres to look at it. Pt's # [phone]

## 2024-04-24 NOTE — DISCHARGE INSTRUCTIONS
Rest.  Tylenol for pain.  Sitz baths.  If the healing abscess does not improve can take antibiotic as prescribed.  Follow-up with your primary care provider.  Return to emergency department for worsening symptoms or any problems

## 2024-04-24 NOTE — ED NOTES
Refused UPT at this time. Dr. Tian notified of this. Patient given DC instructions and prescription. Patient ambulatory off unit.

## 2024-04-24 NOTE — ED PROVIDER NOTES
Encounter Date: 2024       History     Chief Complaint   Patient presents with    Abscess     Perianal abscess x 1 month on R side and one forming on left. Pt had sx for previous one in . Pt is requesting no rectal exam.      29-year-old female presented emergency department for evaluation for possible abscess in the buttock area.  Patient had a perianal abscess in the past few months ago on the right and patient states now she feels something similar on the left.  Patient said she had it for about a month and gradually is improving however wanted to get it checked.  Denies fever or chills or nausea vomiting.  Denies any other complaints.      Review of patient's allergies indicates:   Allergen Reactions    Methotrexate Swelling     Hands face      Stelara [ustekinumab] Hives    Vedolizumab Hives    Adalimumab Rash, Itching and Hives     joint ache      Renflexis [infliximab-abda] Rash     Other reaction(s): Rash or Itch, Other: joint ache, Rash or Itch, Other: joint ache     Past Medical History:   Diagnosis Date    Anxiety     Cholestasis during pregnancy     Crohn's colitis     Depression     Endometriosis     GERD (gastroesophageal reflux disease)     IBS (irritable bowel syndrome)     Kidney stone complicating pregnancy, second trimester 2023    Left ovarian cyst 12/15/2022    TMJ (dislocation of temporomandibular joint)      Past Surgical History:   Procedure Laterality Date    ABDOMINAL HERNIA REPAIR  2023    ABDOMINAL WALL SURGERY  2023    for post operative abscess with partial SBO. left open to heal with wound vac     SECTION  17 &     Two births     SECTION N/A 2023    Procedure:  SECTION;  Surgeon: Lisha Mcdonough MD;  Location: [facility] L&D;  Service: OB/GYN;  Laterality: N/A;    COLON SURGERY  21    A small part along with tbe small bowel    COLONOSCOPY N/A 2022    Procedure: COLONOSCOPY;  Surgeon: Gregorio Bourne MD;   Location: [facility];  Service: Gastroenterology;  Laterality: N/A;    CYSTOSCOPY W/ URETERAL STENT PLACEMENT Right 02/10/2023    Procedure: CYSTOSCOPY, WITH URETERAL STENT INSERTION;  Surgeon: Marry Erickson MD;  Location: [facility];  Service: Urology;  Laterality: Right;    DIAGNOSTIC LAPAROSCOPY Left 12/20/2022    Procedure: LAPAROSCOPY, DIAGNOSTIC- SAMPLING OF PERITONEAL FLUID;  Surgeon: Yajaira Siegel MD;  Location: [facility] OR;  Service: OB/GYN;  Laterality: Left;    INCISION OF PERIRECTAL ABSCESS N/A 08/29/2023    Procedure: INCISION, ABSCESS, PERIRECTAL;  Surgeon: Darleen Pope MD;  Location: [facility];  Service: General;  Laterality: N/A;    OVARY SURGERY Left 11/08/2023    tubo-ovarian abscess removal    presacral neurectomy      2020    SMALL INTESTINE SURGERY  09/21/2021    partial with partial large bowel    SURGICAL REMOVAL OF ENDOMETRIOSIS      TONSILLECTOMY  2006    TUBAL LIGATION  10-26-19    After c-secrtion    URETEROSCOPIC REMOVAL OF URETERIC CALCULUS Right 02/10/2023    Procedure: REMOVAL, CALCULUS, URETER, URETEROSCOPIC;  Surgeon: Marry Erickson MD;  Location: [facility];  Service: Urology;  Laterality: Right;     No family history on file.  Social History     Tobacco Use    Smoking status: Never    Smokeless tobacco: Never   Substance Use Topics    Alcohol use: Not Currently     Comment: Social once every six months    Drug use: Never     Review of Systems   Constitutional: Negative.    HENT: Negative.     Eyes: Negative.    Respiratory: Negative.     Cardiovascular: Negative.  Negative for chest pain.   Gastrointestinal: Negative.    Endocrine: Negative.    Genitourinary: Negative.    Musculoskeletal: Negative.    Skin: Negative.    Allergic/Immunologic: Negative.    Neurological: Negative.    Hematological: Negative.    Psychiatric/Behavioral: Negative.     All other systems reviewed and are negative.      Physical Exam     Initial Vitals [04/23/24 1803]   BP Pulse Resp Temp SpO2    114/89 82 18 98.6 °F (37 °C) 100 %      MAP       --         Physical Exam    Nursing note and vitals reviewed.  Constitutional: She appears well-developed and well-nourished.   HENT:   Head: Normocephalic and atraumatic.   Nose: Nose normal.   Mouth/Throat: Oropharynx is clear and moist.   Eyes: Conjunctivae and EOM are normal. Pupils are equal, round, and reactive to light.   Neck: Neck supple. No thyromegaly present. No tracheal deviation present. No JVD present.   Normal range of motion.  Cardiovascular:  Normal rate, regular rhythm, normal heart sounds and intact distal pulses.           No murmur heard.  Pulmonary/Chest: Breath sounds normal. No stridor. No respiratory distress. She has no wheezes. She has no rales.   Abdominal: Abdomen is soft. Bowel sounds are normal.   Musculoskeletal:         General: No edema. Normal range of motion.      Cervical back: Normal range of motion and neck supple.     Neurological: She is alert and oriented to person, place, and time.   Skin: Skin is warm. Capillary refill takes less than 2 seconds.   Bilateral buttocks with areas of organized abscess as which  healed.  No redness or induration noted at this time and appears to have improved   Psychiatric: She has a normal mood and affect. Thought content normal.         ED Course   Procedures  Labs Reviewed   POCT URINE PREGNANCY          Imaging Results    None          Medications - No data to display  Medical Decision Making  Patient with evidence previous buttock abscess which now improved and no fluctuance or induration noted.  Patient has minimal discomfort with palpation and I believe abscess organized and improved.  Patient discharged with antibiotic and patient to only take antibiotic if symptoms do not improve completely.  I believe patient has almost near complete resolution of symptoms without any treatment and patient said the wound opened up and drained and then felt better after that and I believe she would  improve without any treatment.  Sitz baths recommended.  However antibiotic prescription given and advised to take if she has not improving.  Discharged with return precautions and follow-up    Amount and/or Complexity of Data Reviewed  Labs: ordered. Decision-making details documented in ED Course.    Risk  Prescription drug management.                                      Clinical Impression:  Final diagnoses:  [L03.317] Cellulitis of buttock (Primary)          ED Disposition Condition    Discharge Stable          ED Prescriptions       Medication Sig Dispense Start Date End Date Auth. Provider    sulfamethoxazole-trimethoprim 800-160mg (BACTRIM DS) 800-160 mg Tab Take 1 tablet by mouth 2 (two) times daily. for 7 days 14 tablet 4/23/2024 4/30/2024 Garfield Tian MD          Follow-up Information       Follow up With Specialties Details Why Contact Info    Rose Torres MD Family Medicine In 2 days  [address]  [address] [place] [id number]  [phone]               Garfield Tian MD  04/23/24 9933

## 2024-08-07 ENCOUNTER — PATIENT MESSAGE (OUTPATIENT)
Dept: ADMINISTRATIVE | Facility: HOSPITAL | Age: 29
End: 2024-08-07

## 2024-10-23 ENCOUNTER — TELEPHONE (OUTPATIENT)
Dept: GYNECOLOGIC ONCOLOGY | Facility: CLINIC | Age: 29
End: 2024-10-23

## 2024-10-23 NOTE — TELEPHONE ENCOUNTER
----- Message from Nurse Carlisle sent at 10/22/2024 11:36 AM CDT -----    ----- Message -----  From: Tamiko Jenkins  Sent: 10/21/2024   2:52 PM CDT  To: [facility]    Type: Needs Medical Advice  Who Called:  Patient   Symptoms (please be specific):    How long has patient had these symptoms:    Pharmacy name and phone #:    Best Call Back Number:   Additional Information: Patient is requesting a call back to schedule a NP appt..

## 2024-10-23 NOTE — NURSING
New referral received from Dr Ulloa/ [facility] for diagnosis of large pelvic mass in a patient with an extensive surgical history. Pt called and name and  verified. Explained my role as nurse navigator and direct number provided. Options for [facility] GYN/ONC providers and soonest availability discussed with pt. PT states she needs to come on a Monday or a Tuesday. Pt is unavailable on 10/29/24. So pt scheduled to see Dr Mistry in the next available appointment on 24. Pt aware that sooner appointment are available. Patient encouraged to call for any questions or concerns about her care or appointments. Pt verbalized understanding. Date time and location of appointment reviewed with pt. Appointment letter mailed to pt.     Phone call lasted 22min in duration. Valuable information provided and voiced by pt during the call includes:  Pt will bring [facility] of [facility] to [facility].   Pt has a baseline fear/PTSD of medical offices  She is afraid/cautious of another procedure/surgery because she does not want to become septic and have a wound vac ever again. She is realistic enough to know that a surgery may be necessary.  Pt states I was told (and I am sure she is) that Dr Zazueta GYN/ONC ([facility]) is amazing, but I almost  after my procedure with her. She told me that CRS would be available and then when I had my surgery there was not one available. I think because not everything was removed/managed properly during my surgery it caused me to get septic.  Her scar from the surgical wound (2023 surgery) has healed but it took a toll on her body and she has not fully regained muscle mass and energy since that time.  Pt concerned about having a pelvic exam, as any pressure placed between her naval and c/s scar on her abdomen causes pain.    Referral located in [facility] referral # [id number]  Navigator contacted Franci Guerra with appointment  information. Franci updated referral and will re-fax to the office so that referral matches appointment date, location and provider.     Oncology Navigation   Intake  Cancer Type: Gynecologic (15.5cm pelvic mass)  Type of Referral: External ([facility]- [facility]iselda [facility] and Dr Zarina Alarcon)  Date of Referral: 10/22/24  Initial Nurse Navigator Contact: 10/23/24  Referral to Initial Contact Timeline (days): 1  Reason if booked > 7 days after scheduling: Transportation coordination; Specific provider / access; Patient request     Treatment  Current Status: Staging work-up    Procedures: MRI  MRI Schedule Date: 09/16/24     Provider:  PCP: Dr Rose Torres/ Dr Zarina Alarcon  OBGYN: Dr Griselda Ulloa ([facility])  GYN/ONC: Dr Sade Zazueta ([facility])  Urology: Dr Marry Erickson  GI: Dr Terra Bhatia ([facility] Dr Bryanna Sanchez  Care provided in [place], [place], [place], [place] and [place]    Medical History:  Chron's (partial small bowel removal)  Anxiety (especially over MD appointments) Xanax'  Major depressive disorder   BTL   Endometriosis  IBS  GERD  TMJ  c/s x3 (Jan 2017, Oct 2019, and June 2023)    10/26/2019- c/s #2 BTL after a c/s    2020- presacral neurectomy    9/21/21- resection Terminal ileum and portion of ascending colon; ileocecectomy ([facility] in [place], [place])    12/20/22- Diagnostic laparoscopy and sampling of peritoneal fluid by Dr Yajaira Siegel    2/10/2023- ureteroscope removal of ureteric right calculus (while in 2nd trimester) Dr Marry Erickson    6/18/23- c/s #3 (did IVF to conceive due to being post BTL) Dr Lisha Mcdonough/ Dr Jeff Bajwa [facility]    8/29/2023- perianal abscess requiring I&D in the ER. CT   Multi loculated fluid collection with peripheral enhancement containing gas in the left pelvis measuring approximately 9.1 x 6.7 cm which closely abuts the left ovary and left uterine fundus and probably arises from the left ovary. This left pelvis/adnexal complex fluid  collection closely abuts the sigmoid with focal area of wall thickening of the sigmoid. Overall findings are concerning for tubo-ovarian abscess with involvement of the sigmoid. Fistula formation is difficult to exclude. Alternatively, differential diagnosis includes a superinfected necrotic ovarian or uterine mass. Recommend surgery consultation.  Hepatomegaly. Splenomegaly.  Hypodense lesion measuring 1.1 cm in the posterior right hepatic lobe appears unchanged from 2023 possibly a hemangioma. Consider follow-up MRI.  Bilateral nephrolithiasis without hydronephrosis.    10/2023- robotic assisted LSO, benign dermoid    10/19/2023- [facility] ex lap by General Surgery and Dr Zazueta, incarcerated hernia repair, abdominal washout and mesh placement    2023- abdominal hernia repair, removal of mesh and drainage of left tubo-ovarian abscess (Dr Stahl/ Dr Zazueta @ [facility])    2023- Developed fever and acute abdominal pain. She was found to have abdominal wall abscess, peritonitis, and partial small-bowel obstruction. An open procedure performed at [facility] (Dr Zazueta). Surgical wound left open to heal with wound vac and THOMPSON drain ([facility] for wound care and in home IV antibiotics)    23- CT Abscess drainage- cancelled right ovarian complex fluid collection drainage due to no safe window for access (Dr Joshua Almaguer [facility])    24- MRI ([facility]) 15cm pelvic cyst, ordered by Dr Zarina Alarcon  Uterus/cervix- the uterus is normal in size, measuring 5.4 x 4 x 5.5cm with scarring and susceptibility artifact along the ventral lower the uterine segment, most compatible with a  section scar. The endometrial stripe in normal in thickness measuring up to 8mm. Two small right Nabothian cysts adjacent to the cervix  Ovaries/adnexa: the left ovary is not visualized. The right adnexa has been near completed replaced bua complex collection of cystic foci of varying size, complexity and  signal intensity overall measuring 4.04 x 4.2 x 4.4 cm. Several of the cystic components appear serpiginous with intrinsic T1 hyperintensity/ T2 isointensity. Mild enhancement is identified intervening between the cyst without evidence of diffusion. Additionally, there is a large loculated fluid collection surrounding the right adnexa extending into the lower abdomen and pelvis and measuring approximately 9.9 x 7.1 x15.5 cm, containing this internal septations.  Vagina: Punctate T2 hyperintense focus along the right labia possibly a small Bartholin gland cyst  Peritoneum: loculated collection surrounding the right adnexa next vein into the pelvis and lower abdomen, as above. There is mild T2 hypointense thickening of the peritoneal lining along the lower quadrants and pelvis suggestive of mild fibrotic thickening.    10/21/24- Referral [facility] for GYN[facility]ONC [facility] for a 15cm pelvic fluid collection in the setting of complex abdominal surgical history and Crohn's disease.       Acuity      Follow Up  No follow-ups on file.

## 2024-11-04 NOTE — PROGRESS NOTES
SUBJECTIVE   Chief complaint: Peritoneal fluid collection    Referring provider: Griselda Ulloa DO  History of present Illness:  Agnes Matos is a 29 y.o.  female with  chron's disease and history of robotic assisted LSO for tuboovarian abscess 10/19/2023 followed by complicated post op course requiring multiple abdominal surgeries and wound vac (see below). She has had a known fluid collection of the right pelvis since 2023. IR guided drainage was not feasible at that time, and she then stopped following up with Dr. Zazueta and General surgery. She endorses ongoing abdominal/ pelvic tenderness and pressure/ pain with urination. [facility] showed the complex pelvic fluid collection and so she was referred here.     She has a past medical history of Crohn's colitis, IBS, perianal abscess, endometriosis, GERD, left ovarian cyst, and anxiety.     She has a past surgical history of CS x3, BTL following 2nd pregnancy, presacral neurectomy in , Ileocecal resection 2021, Robotic assisted LSO 10/2023, Ex Lap/ incarcerated hernia repair/ abdominal washout and mesh placement 10/2023, abdominal hernia repair/ removal of mesh/ abscess drainage 2023, Open ex lap 2023.     She denies prior abnormal Pap testing, but doesn't think she is up to date.   She denies family history of breast/gyn/colon cancer.    Data Reviewed:   2023 CT-   Multi loculated fluid collection with peripheral enhancement containing gas in the left pelvis measuring approximately 9.1 x 6.7 cm which closely abuts the left ovary and left uterine fundus and probably arises from the left ovary. This left pelvis/adnexal complex fluid collection closely abuts the sigmoid with focal area of wall thickening of the sigmoid. Overall findings are concerning for tubo-ovarian abscess with involvement of the sigmoid. Fistula formation is difficult to exclude. Alternatively, differential diagnosis includes a superinfected  necrotic ovarian or uterine mass. Recommend surgery consultation.  Hepatomegaly. Splenomegaly.  Hypodense lesion measuring 1.1 cm in the posterior right hepatic lobe appears unchanged from 2023 possibly a hemangioma. Consider follow-up MRI.  Bilateral nephrolithiasis without hydronephrosis.    10/17/2023 MRI-   REPRODUCTIVE ORGANS:  In the mid to left pelvis, there ism a mixed signal mass measuring 10.0 x 6.6 x 5.9 cm. The mass demonstrates central portions which are T2 hyperintense, T1 hypointense, do not enhance, and markedly restricted diffusion. Innumerable thin septations and the periphery of the lesion enhance. There is also nodular enhancement along the left posterior margin entheses for instance, image 37 of series 1803. There is no substantial T1 hyperintensity associated with this lesion. No ovary is identified separately on the lateral left. The right ovary is normal. The uterus demonstrates ventral signs of  section but is otherwise normal.   1. No evidence of active Crohn's disease.   2. A mass replaces or involves the left ovary. This finding demonstrates features atypical of endometriosis/endometrioma. Ovarian neoplasm is of primary concern.   3. Small focus of T1 hyperintensity in the pelvis is compatible with the known history of endometriosis.     10/2023- robotic assisted LSO, benign TOA (Dr. Zazueta)     10/19/2023- [facility] ex lap by General Surgery and Dr Zazueat, incarcerated hernia repair, abdominal washout and mesh placement     2023- abdominal hernia repair, removal of mesh and drainage of   abscess (Dr Stahl/ Dr Zazueta @ [facility])     2023- Developed fever and acute abdominal pain. She was found to have abdominal wall abscess, peritonitis, and partial small-bowel obstruction. Reports ExLap/Washout. Surgical wound left open to heal with wound vac and THOMPSON drain ([facility] for wound care and in home IV antibiotics)     23- CT Abscess drainage- cancelled right ovarian  complex fluid collection drainage due to no safe window for access     2024 MRI-  Enlargement of the right adnexa, near completely replaced by complex collection of cystic foci of varying size, complexity, and signal intensity, measuring up to 4.4 cm in total. Several of the cystic components appear serpiginous with intrinsic T1 hyperintensity/T2 hypointensity, possibly hematosalpinx. Overall, findings could represent sequelae of hemorrhagic cysts and/or endometriosis; less likely neoplastic process. Additionally, a large loculated fluid collection surrounds the right adnexa, extending into the lower abdomen and pelvis and measuring up to 15.5 cm, suspicious for large peritoneal inclusion cyst related to adhesions. Mild fibrotic thickening along the peritoneal lining, suggestive of adhesive disease, possibly related to prior surgery, prior reported abscess, or report inflammatory bowel disease.       Review of Systems per HPI   Past Medical History:   Diagnosis Date    Anxiety     Cholestasis during pregnancy     Crohn's colitis     Depression     Endometriosis     GERD (gastroesophageal reflux disease)     IBS (irritable bowel syndrome)     Kidney stone complicating pregnancy, second trimester 2023    Left ovarian cyst 12/15/2022    TMJ (dislocation of temporomandibular joint)       Past Surgical History:   Procedure Laterality Date    ABDOMINAL HERNIA REPAIR  2023    ABDOMINAL WALL SURGERY  2023    for post operative abscess with partial SBO. left open to heal with wound vac     SECTION  17 &     Two births     SECTION N/A 2023    Procedure:  SECTION;  Surgeon: Lisha Mcdonough MD;  Location: [facility] L&D;  Service: OB/GYN;  Laterality: N/A;    COLON SURGERY  21    A small part along with tbe small bowel    COLONOSCOPY N/A 2022    Procedure: COLONOSCOPY;  Surgeon: Gregorio Bourne MD;  Location: [facility];  Service: Gastroenterology;   Laterality: N/A;    CYSTOSCOPY W/ URETERAL STENT PLACEMENT Right 02/10/2023    Procedure: CYSTOSCOPY, WITH URETERAL STENT INSERTION;  Surgeon: Marry Erickson MD;  Location: [facility] OR;  Service: Urology;  Laterality: Right;    DIAGNOSTIC LAPAROSCOPY Left 12/20/2022    Procedure: LAPAROSCOPY, DIAGNOSTIC- SAMPLING OF PERITONEAL FLUID;  Surgeon: Yajaira Siegel MD;  Location: [facility] OR;  Service: OB/GYN;  Laterality: Left;    INCISION OF PERIRECTAL ABSCESS N/A 08/29/2023    Procedure: INCISION, ABSCESS, PERIRECTAL;  Surgeon: Darleen Pope MD;  Location: [facility] OR;  Service: General;  Laterality: N/A;    OVARY SURGERY Left 11/08/2023    tubo-ovarian abscess removal    presacral neurectomy      2020    SMALL INTESTINE SURGERY  09/21/2021    partial with partial large bowel    SURGICAL REMOVAL OF ENDOMETRIOSIS      TONSILLECTOMY  2006    TUBAL LIGATION  10-26-19    After c-secrtion    URETEROSCOPIC REMOVAL OF URETERIC CALCULUS Right 02/10/2023    Procedure: REMOVAL, CALCULUS, URETER, URETEROSCOPIC;  Surgeon: Marry Erickson MD;  Location: [facility];  Service: Urology;  Laterality: Right;      Review of patient's allergies indicates:   Allergen Reactions    Methotrexate Swelling     Hands face      Stelara [ustekinumab] Hives    Vedolizumab Hives    Adalimumab Rash, Itching and Hives     joint ache      Renflexis [infliximab-abda] Rash     Other reaction(s): Rash or Itch, Other: joint ache, Rash or Itch, Other: joint ache     Current Outpatient Medications   Medication Instructions    acetaminophen (TYLENOL) 1,000 mg, Oral, Every 8 hours    ALPRAZolam (XANAX) 0.25 MG tablet Take one tablet 30 minutes prior to medical encounters    Bacillus coagulans 250 million cell Chew Oral    gabapentin (NEURONTIN) 300 mg, Oral, Daily    ondansetron (ZOFRAN) 8 mg, Oral, Every 4 hours PRN    oxyCODONE-acetaminophen (PERCOCET) 5-325 mg per tablet 1 tablet, Oral, Every 6 hours PRN    pantoprazole (PROTONIX) 40 mg, Oral, 2 times daily  "   piperacillin-tazobactam (ZOSYN IN DEXTROSE, ISO-OSM,) IVPB 4.5 g, Intravenous, Every 8 hours    polyethylene glycol (GLYCOLAX) 17 g, Oral, Daily    tacrolimus (PROGRAF) 1 MG Cap 1 capsule, 2 times daily     OB History    Para Term  AB Living   3 3 3     3   SAB IAB Ectopic Multiple Live Births         0 3      # Outcome Date GA Lbr Kana/2nd Weight Sex Type Anes PTL Lv   3 Term 23 38w1d  3.85 kg (8 lb 7.8 oz) M CS-LTranv Spinal, EPI N POAL      Complications: Breech presentation   2 Term 2019    F CS-Unspec EPI N OPAL   1 Term 2017    F CS-Unspec EPI N OPAL     Social History     Tobacco Use    Smoking status: Never    Smokeless tobacco: Never   Substance Use Topics    Alcohol use: Not Currently     Comment: Social once every six months    Drug use: Never      No family history on file.  Health Maintenance Topics with due status: Not Due       Topic Last Completion Date    TETANUS VACCINE 2019    RSV Vaccine (Age 60+ and Pregnant patients) Not Due     Health Maintenance Due   Topic Date Due    Lipid Panel  Never done    Pneumococcal Vaccines (Age 0-64) (1 of 2 - PCV) Never done    HIV Screening  Never done    Pap Smear  Never done    COVID-19 Vaccine (3 - Pfizer risk series) 11/10/2021    Influenza Vaccine (1) 2024     OBJECTIVE   /65 (BP Location: Left forearm, Patient Position: Sitting)   Pulse 76   Temp 98 °F (36.7 °C) (Temporal)   Resp 18   Ht 5' 3" (1.6 m)   Wt 83.3 kg (183 lb 10.3 oz)   SpO2 100%   BMI 32.53 kg/m²     Physical Exam  Vitals reviewed. Exam conducted with a chaperone present.   Constitutional:       Appearance: Normal appearance.   Cardiovascular:      Rate and Rhythm: Normal rate.   Pulmonary:      Effort: Pulmonary effort is normal.   Abdominal:      General: There is no distension.      Palpations: Abdomen is soft. There is no mass.      Tenderness: There is abdominal tenderness. There is no guarding or rebound.      Comments: Vertical midline " incision noted    Genitourinary:     General: Normal vulva.      Vagina: Normal.      Cervix: Friability present.      Uterus: Normal.       Adnexa:         Right: Fullness present. No mass.          Left: No mass.        Comments: Some fullness on pelvic exam but no discreet palpable mass or nodularity  Cervical ectropion   Neurological:      Mental Status: She is alert.        ASSESSMENT      1. Cervical cancer screening  - Liquid-Based Pap Smear, Screening  - HPV High Risk Genotypes, PCR    2. Pelvic pain  - Ambulatory referral/consult to Physical/Occupational Therapy; Future    3. Pelvic fluid collection  - Ambulatory referral/consult to Physical/Occupational Therapy; Future    PLAN     29 y.o. with chronic pelvic fluid collection after a complicated post op course in 2023 in the setting of multiple past abdominal surgeries and Chron's disease.  I reviewed her available records in detail and examined her today.   Overall, this is most consistent with chronic fluid collection/peritoneal inclusion cyst related to her previous surgeries and abscesses. I will plan to obtain images from imaging 10/2023 - 12/2023, all operative reports from LSO and subsequent procedures, and upload her recent MRI to review.   I would not recommend a repeat operation in the absence of an acute process or suspicion for malignancy, neither of which are present at this time, as she is at extremely high risk for serious surgical morbidity.  If worsening symptoms or enlarging fluid collection and IR drainage feasible, this is something that could be considered. I would also recommend evaluation by pelvic floor PT for pelvic pain. I agree with her ongoing evaluation of her Chron's disease by GI.   All questions were welcomed and answered.  I will call her with a final recommendation after review of her imaging.      Deann Mistry MD  Gynecologic Oncology      A total of 60 minutes were spent on encounter including record review, imaging  review, counseling patient, coordinating future care.

## 2024-11-05 ENCOUNTER — OFFICE VISIT (OUTPATIENT)
Dept: GYNECOLOGIC ONCOLOGY | Facility: CLINIC | Age: 29
End: 2024-11-05
Payer: OTHER GOVERNMENT

## 2024-11-05 VITALS
OXYGEN SATURATION: 100 % | SYSTOLIC BLOOD PRESSURE: 104 MMHG | TEMPERATURE: 98 F | RESPIRATION RATE: 18 BRPM | WEIGHT: 183.63 LBS | HEART RATE: 76 BPM | DIASTOLIC BLOOD PRESSURE: 65 MMHG | HEIGHT: 63 IN | BODY MASS INDEX: 32.54 KG/M2

## 2024-11-05 DIAGNOSIS — R10.2 PELVIC PAIN: ICD-10-CM

## 2024-11-05 DIAGNOSIS — Z12.4 CERVICAL CANCER SCREENING: ICD-10-CM

## 2024-11-05 DIAGNOSIS — R18.8 PELVIC FLUID COLLECTION: Primary | ICD-10-CM

## 2024-11-05 PROCEDURE — 99205 OFFICE O/P NEW HI 60 MIN: CPT | Mod: S$PBB,,, | Performed by: OBSTETRICS & GYNECOLOGY

## 2024-11-05 PROCEDURE — 99999 PR PBB SHADOW E&M-EST. PATIENT-LVL IV: CPT | Mod: PBBFAC,,, | Performed by: OBSTETRICS & GYNECOLOGY

## 2024-11-05 PROCEDURE — 99214 OFFICE O/P EST MOD 30 MIN: CPT | Mod: PBBFAC,PN | Performed by: OBSTETRICS & GYNECOLOGY

## 2024-11-20 ENCOUNTER — TUMOR BOARD CONFERENCE (OUTPATIENT)
Dept: GYNECOLOGIC ONCOLOGY | Facility: CLINIC | Age: 29
End: 2024-11-20
Payer: OTHER GOVERNMENT

## 2024-11-20 NOTE — PROGRESS NOTES
Multidisciplinary Gynecologic Oncology Cancer Conference - Evaluation and Recommendation Summary  Patient Name: Agnes Matos  : 1995  MRN: [id number]     1. Evaluation  HPI: 29 year old  with Crohn's disease and history of robotic assisted LSO for tuboovarian abscess 10/19/2023 followed by complicated post op course requiring multiple abdominal surgeries and wound vac (see below). She has had a known fluid collection of the right pelvis since 2023. IR guided drainage was not feasible at that time, and she then stopped following up with Dr. Zazueta and General surgery. She endorses ongoing abdominal/ pelvic tenderness and pressure/ pain with urination. [facility] showed the complex pelvic fluid collection  2023 CT-   1.Multi loculated fluid collection with peripheral enhancement containing gas in the left pelvis measuring approximately 9.1 x 6.7 cm which closely abuts the left ovary and left uterine fundus and probably arises from the left ovary. This left pelvis/adnexal complex fluid collection closely abuts the sigmoid with focal area of wall thickening of the sigmoid. Overall findings are concerning for tubo-ovarian abscess with involvement of the sigmoid. Fistula formation is difficult to exclude. Alternatively, differential diagnosis includes a superinfected necrotic ovarian or uterine mass. Recommend surgery consultation.  2. Hepatomegaly. Splenomegaly.  3.Hypodense lesion measuring 1.1 cm in the posterior right hepatic lobe appears unchanged from 2023 possibly a hemangioma. Consider follow-up MRI4.Bilateral nephrolithiasis without hydronephrosis.     10/17/2023 MRI-   1. No evidence of active Crohn's disease.   2. A mass replaces or involves the left ovary. This finding demonstrates features atypical of endometriosis/endometrioma. Ovarian neoplasm is of primary concern.   3. Small focus of T1 hyperintensity in the pelvis is compatible with the known history of  endometriosis.      10/2023- robotic assisted LSO, benign TOA (Dr. Zazueta)     10/19/2023- [facility] ex lap by General Surgery and Dr Zazueta, incarcerated hernia repair, abdominal washout and mesh placement     11/8/2023- abdominal hernia repair, removal of mesh and drainage of abscess (Dr Stahl/ Dr Zazueta @ [facility])     11/18/2023- Developed fever and acute abdominal pain. She was found to have abdominal wall abscess, peritonitis, and partial small-bowel obstruction. Reports ExLap/Washout. Surgical wound left open to heal with wound vac and THOMPSON drain ([facility] for wound care and in home IV antibiotics)     12/19/23- CT Abscess drainage- cancelled right ovarian complex fluid collection drainage due to no safe window for access      9/16/2024 MRI-  Enlargement of the right adnexa, near completely replaced by complex collection of cystic foci of varying size, complexity, and signal intensity, measuring up to 4.4 cm in total. Several of the cystic components appear serpiginous with intrinsic T1 hyperintensity/T2 hypointensity, possibly hematosalpinx. Overall, findings could represent sequelae of hemorrhagic cysts and/or endometriosis; less likely neoplastic process. Additionally, a large loculated fluid collection surrounds the right adnexa, extending into the lower abdomen and pelvis and measuring up to 15.5 cm, suspicious for large peritoneal inclusion cyst related to adhesions. Mild fibrotic thickening along the peritoneal lining, suggestive of adhesive disease, possibly related to prior surgery, prior reported abscess, or report inflammatory bowel disease.    Imaging: outside MRI uploaded 9/2024      2. Treatment Recommendation     Consensus opinion, after imaging review confirms nothing overtly suspicious for malignancy, is to continue GI workup. Consider IR drainage of fluid collection if symptomatic.

## 2024-12-10 ENCOUNTER — PATIENT MESSAGE (OUTPATIENT)
Dept: GYNECOLOGIC ONCOLOGY | Facility: CLINIC | Age: 29
End: 2024-12-10
Payer: OTHER GOVERNMENT

## 2025-02-19 ENCOUNTER — PATIENT MESSAGE (OUTPATIENT)
Dept: FAMILY MEDICINE | Facility: CLINIC | Age: 30
End: 2025-02-19
Payer: OTHER GOVERNMENT

## 2025-03-03 ENCOUNTER — OFFICE VISIT (OUTPATIENT)
Dept: URGENT CARE | Facility: CLINIC | Age: 30
End: 2025-03-03
Payer: OTHER GOVERNMENT

## 2025-03-03 VITALS
HEART RATE: 74 BPM | OXYGEN SATURATION: 98 % | HEIGHT: 63 IN | DIASTOLIC BLOOD PRESSURE: 69 MMHG | TEMPERATURE: 99 F | WEIGHT: 185 LBS | BODY MASS INDEX: 32.78 KG/M2 | SYSTOLIC BLOOD PRESSURE: 106 MMHG | RESPIRATION RATE: 17 BRPM

## 2025-03-03 DIAGNOSIS — R05.9 COUGH, UNSPECIFIED TYPE: ICD-10-CM

## 2025-03-03 DIAGNOSIS — R06.02 SHORTNESS OF BREATH: Primary | ICD-10-CM

## 2025-03-03 DIAGNOSIS — J20.9 ACUTE BRONCHITIS, UNSPECIFIED ORGANISM: ICD-10-CM

## 2025-03-03 PROCEDURE — 71046 X-RAY EXAM CHEST 2 VIEWS: CPT | Mod: S$GLB,,, | Performed by: RADIOLOGY

## 2025-03-03 PROCEDURE — 99214 OFFICE O/P EST MOD 30 MIN: CPT | Mod: S$GLB,,, | Performed by: NURSE PRACTITIONER

## 2025-03-03 RX ORDER — AZITHROMYCIN 250 MG/1
TABLET, FILM COATED ORAL
Qty: 6 TABLET | Refills: 0 | Status: SHIPPED | OUTPATIENT
Start: 2025-03-03 | End: 2025-03-08

## 2025-03-03 NOTE — PATIENT INSTRUCTIONS
Follow-up with primary care provider in 1 week    Go to the emergency room for any breathing difficulty or shortness of breath      Chest x-ray is negative for pneumonia today

## 2025-03-03 NOTE — PROGRESS NOTES
"Subjective:      Patient ID: Agnes Matos is a 29 y.o. female.    Vitals:  height is 5' 3" (1.6 m) and weight is 83.9 kg (185 lb). Her temperature is 98.8 °F (37.1 °C). Her blood pressure is 106/69 and her pulse is 74. Her respiration is 17 and oxygen saturation is 98%.     Chief Complaint: Cough    Patient states she's had a cough x 3 weeks that's worse at night. Patient states she has also started a new chron's medication about 3 weeks ago and isn't sure if the cough is a side effect of it. She  reports her cough is productive, she has not had any shortness of breath, wheezing, or fever. Patient states that her daughter was sick a couple of weeks ago with URI symptoms.  Patient does have a history of Crohn's disease and recently started taking Rinvoq.     Cough  Associated symptoms include chills, a fever and postnasal drip. Pertinent negatives include no chest pain, ear pain, headaches, sore throat or shortness of breath.       Constitution: Positive for chills, sweating, fatigue and fever.   HENT:  Positive for congestion and postnasal drip. Negative for ear pain, ear discharge, sinus pain and sore throat.    Neck: Negative for neck pain and neck stiffness.   Cardiovascular:  Negative for chest pain and sob on exertion.   Eyes: Negative.    Respiratory:  Positive for cough and sputum production. Negative for shortness of breath and stridor.    Gastrointestinal:  Positive for nausea. Negative for abdominal pain and vomiting.        Nausea is consistent with her Crohn's   Endocrine: negative.   Genitourinary: Negative.    Musculoskeletal: Negative.    Skin: Negative.    Allergic/Immunologic: Positive for immunocompromised state.   Neurological:  Negative for dizziness and headaches.   Hematologic/Lymphatic: Negative.    Psychiatric/Behavioral: Negative.        Objective:     Physical Exam   Constitutional: She is oriented to person, place, and time.  Non-toxic appearance. She does not appear ill. No distress. " obesity  HENT:   Head: Normocephalic.   Ears:   Right Ear: Tympanic membrane, external ear and ear canal normal.   Left Ear: Tympanic membrane, external ear and ear canal normal.   Nose: Nose normal. No rhinorrhea or congestion.   Mouth/Throat: Mucous membranes are dry. No oropharyngeal exudate or posterior oropharyngeal erythema.   Eyes: Conjunctivae are normal. Pupils are equal, round, and reactive to light. Extraocular movement intact   Neck: Neck supple. No neck rigidity present.   Cardiovascular: Normal rate, regular rhythm and normal heart sounds.   Pulmonary/Chest: Effort normal and breath sounds normal. No stridor. No respiratory distress. She has no wheezes. She has no rhonchi. She has no rales.   Abdominal: Normal appearance and bowel sounds are normal. She exhibits no distension. Soft. There is no abdominal tenderness. There is no rebound, no guarding, no left CVA tenderness and no right CVA tenderness.   Musculoskeletal: Normal range of motion.         General: Normal range of motion.      Cervical back: She exhibits no tenderness.   Lymphadenopathy:     She has no cervical adenopathy.   Neurological: no focal deficit. She is alert, oriented to person, place, and time and at baseline.   Skin: Skin is warm, dry and not diaphoretic. Capillary refill takes less than 2 seconds.   Psychiatric: Her behavior is normal. Mood, judgment and thought content normal.       Assessment:     1. Shortness of breath    2. Cough, unspecified type    3. Acute bronchitis, unspecified organism        Plan:       Shortness of breath  -     XR CHEST PA AND LATERAL; Future; Expected date: 03/03/2025    Cough, unspecified type  -     XR CHEST PA AND LATERAL; Future; Expected date: 03/03/2025    Acute bronchitis, unspecified organism  -     azithromycin (Z-KAMILA) 250 MG tablet; Take 2 tablets by mouth on day 1; Take 1 tablet by mouth on days 2-5  Dispense: 6 tablet; Refill: 0  -     pyrilamine-chlophedianoL 12.5-12.5 mg/5 mL Liqd;  Take 10 mLs by mouth every 8 (eight) hours as needed.  Dispense: 473 mL; Refill: 0

## 2025-06-19 DIAGNOSIS — D84.9 IMMUNOSUPPRESSION: ICD-10-CM

## 2025-06-19 DIAGNOSIS — K76.0 NONALCOHOLIC FATTY LIVER: ICD-10-CM

## 2025-06-19 DIAGNOSIS — K50.819 CROHN'S DISEASE OF BOTH SMALL AND LARGE INTESTINE WITH COMPLICATION: ICD-10-CM

## 2025-06-19 DIAGNOSIS — K50.819 CROHN'S DISEASE OF SMALL AND LARGE INTESTINES WITH COMPLICATION: Primary | ICD-10-CM

## 2025-07-17 ENCOUNTER — E-VISIT (OUTPATIENT)
Dept: FAMILY MEDICINE | Facility: CLINIC | Age: 30
End: 2025-07-17
Payer: OTHER GOVERNMENT

## 2025-07-17 DIAGNOSIS — R10.2 PELVIC PAIN: Primary | ICD-10-CM

## 2025-07-17 NOTE — PROGRESS NOTES
Patient ID: Agnes Matos is a 30 y.o. female.    Chief Complaint: General Illness (Entered automatically based on patient selection in [employer].)    The patient initiated a request through [employer] on 7/17/2025 for evaluation and management with a chief complaint of General Illness (Entered automatically based on patient selection in [employer].)     I evaluated the questionnaire submission on 7/17/25.    [facility]    7/17/2025  5:02 PM CDT - Filed by Patient   What do you need help with? Other Concern   Do you agree to participate in an E-Visit? Yes   If you have any of the following symptoms, please go to the nearest emergency room or call 911: I acknowledge   Do you have any of the following pregnancy-related conditions? (Pregnant, Possibly pregnant, Breast feeding, None) None   What is the main issue you would like addressed today? I have had chronic pelvic pain since January 2024. I was sent to an [facility]N Oncologist who did not want to proceed with any kind of procedure due to my history of sepsis since her team didnt think i was at risk for cancer.   Please describe your symptoms. Pain in pelvis when touched. Pain in pelivs when peeing. (Not uretha because a UTI doesnt last a year and a half)   Where is your problem located? Pelvis (pain with intercourse) bleeding after intercourse   On a scale of 1-10, where 10 is the worst you can imagine, how severe are your symptoms? (range: 1 - 10) 5   Have you had these symptoms before? Yes   How long have you been having these symptoms? (Just today, For a few days, For a week, For one to four weeks, For more than a month) More than a month   What helps with your symptoms? Not peeing & no one touching my lower abdomen & pelvis   What makes your symptoms feel worse? Touching it, intercourse, peeing   Are these symptoms related to a condition that you currently have? (Yes, No, Not sure) Yes   What condition do you currently have? Endometriosis &  Adomiosis   When were you last seen for this condition?    Provide any information you feel is important to your history not asked above I feel as though the Oncologist is scared to move forward with any kind of procedure due to my history of sepsis. But, living with this pain is affecting my quality of life.   Please attach any relevant images or files    Are you able to take your vitals? No         Encounter Diagnosis   Name Primary?    Pelvic pain Yes        No orders of the defined types were placed in this encounter.           No follow-ups on file.      E-Visit Time Trackinminutes

## 2025-07-21 ENCOUNTER — HOSPITAL ENCOUNTER (OUTPATIENT)
Dept: RADIOLOGY | Facility: HOSPITAL | Age: 30
Discharge: HOME | End: 2025-07-21
Attending: INTERNAL MEDICINE
Payer: OTHER GOVERNMENT

## 2025-07-21 DIAGNOSIS — D84.9 IMMUNOSUPPRESSION: ICD-10-CM

## 2025-07-21 DIAGNOSIS — K76.0 NONALCOHOLIC FATTY LIVER: ICD-10-CM

## 2025-07-21 DIAGNOSIS — K50.819 CROHN'S DISEASE OF SMALL AND LARGE INTESTINES WITH COMPLICATION: ICD-10-CM

## 2025-07-21 PROCEDURE — 72197 MRI PELVIS W/O & W/DYE: CPT | Mod: 26,,, | Performed by: RADIOLOGY

## 2025-07-21 PROCEDURE — 74183 MRI ABD W/O CNTR FLWD CNTR: CPT | Mod: 26,,, | Performed by: RADIOLOGY

## 2025-07-21 PROCEDURE — 63600175 PHARM REV CODE 636 W HCPCS: Mod: JZ,TB,PO | Performed by: RADIOLOGY

## 2025-07-21 PROCEDURE — 25500020 PHARM REV CODE 255: Mod: PO

## 2025-07-21 PROCEDURE — 74183 MRI ABD W/O CNTR FLWD CNTR: CPT | Mod: TC,PO

## 2025-07-21 PROCEDURE — A9585 GADOBUTROL INJECTION: HCPCS | Mod: PO

## 2025-07-21 RX ORDER — GADOBUTROL 604.72 MG/ML
8 INJECTION INTRAVENOUS
Status: COMPLETED | OUTPATIENT
Start: 2025-07-21 | End: 2025-07-21

## 2025-07-21 RX ORDER — GLUCAGON 1 MG
0.2 KIT INJECTION ONCE
Status: COMPLETED | OUTPATIENT
Start: 2025-07-21 | End: 2025-07-21

## 2025-07-21 RX ADMIN — GLUCAGON 0.2 MG: KIT at 10:07

## 2025-07-21 RX ADMIN — GADOBUTROL 8 ML: 604.72 INJECTION INTRAVENOUS at 10:07

## 2025-08-20 ENCOUNTER — PATIENT MESSAGE (OUTPATIENT)
Dept: ADMINISTRATIVE | Facility: HOSPITAL | Age: 30
End: 2025-08-20
Payer: OTHER GOVERNMENT

## 2025-09-02 ENCOUNTER — OFFICE VISIT (OUTPATIENT)
Dept: OBSTETRICS AND GYNECOLOGY | Facility: CLINIC | Age: 30
End: 2025-09-02
Payer: OTHER GOVERNMENT

## 2025-09-02 VITALS — WEIGHT: 184.5 LBS | BODY MASS INDEX: 32.69 KG/M2 | SYSTOLIC BLOOD PRESSURE: 108 MMHG | DIASTOLIC BLOOD PRESSURE: 78 MMHG

## 2025-09-02 DIAGNOSIS — R10.2 PELVIC PAIN: Primary | ICD-10-CM

## 2025-09-02 DIAGNOSIS — N80.9 ENDOMETRIOSIS, UNSPECIFIED: ICD-10-CM

## 2025-09-02 DIAGNOSIS — K50.919 CROHN'S DISEASE OF INTESTINE WITH COMPLICATION: ICD-10-CM

## 2025-09-02 PROCEDURE — 99999 PR PBB SHADOW E&M-EST. PATIENT-LVL III: CPT | Mod: PBBFAC,,, | Performed by: OBSTETRICS & GYNECOLOGY

## 2025-09-02 PROCEDURE — 99213 OFFICE O/P EST LOW 20 MIN: CPT | Mod: PBBFAC,PO | Performed by: OBSTETRICS & GYNECOLOGY

## 2025-09-03 ENCOUNTER — HOSPITAL ENCOUNTER (OUTPATIENT)
Dept: RADIOLOGY | Facility: HOSPITAL | Age: 30
Discharge: HOME | End: 2025-09-03
Attending: INTERNAL MEDICINE
Payer: OTHER GOVERNMENT

## 2025-09-03 DIAGNOSIS — K50.819 CROHN'S DISEASE OF SMALL AND LARGE INTESTINES WITH COMPLICATION: ICD-10-CM

## 2025-09-03 DIAGNOSIS — D84.9 IMMUNOSUPPRESSION: ICD-10-CM

## 2025-09-03 DIAGNOSIS — K50.819 CROHN'S DISEASE OF BOTH SMALL AND LARGE INTESTINE WITH COMPLICATION: ICD-10-CM

## 2025-09-03 DIAGNOSIS — K76.0 NONALCOHOLIC FATTY LIVER: ICD-10-CM

## 2025-09-03 PROCEDURE — 77080 DXA BONE DENSITY AXIAL: CPT | Mod: TC,PO

## (undated) DEVICE — ADHESIVE MASTISOL VIAL 0523-48

## (undated) DEVICE — AGENT HEMOSTATIC ARISTA 3GR

## (undated) DEVICE — SUTURE SILK 2-0 SH 30 K833H

## (undated) DEVICE — HEMOSTAT FIBRILLAR 2X4 1962

## (undated) DEVICE — TUBING CYSTO SINGLE V4608-20

## (undated) DEVICE — GLOVE BIOGELPI GOLD SIZE 7.5

## (undated) DEVICE — PACK CYSTOSCOPY III

## (undated) DEVICE — DRESSING POST OP MEPILEX  AG  4X12

## (undated) DEVICE — GOWN X-LARGE 044674

## (undated) DEVICE — GOWN SMART LRG 044673

## (undated) DEVICE — TRAY SKIN PREP DRY

## (undated) DEVICE — SOLUTION H2O IRRIGATION 3000ML R8006

## (undated) DEVICE — PAD ABD 5X9   7196D

## (undated) DEVICE — TRAY GENERAL SURGERY

## (undated) DEVICE — SOLUTION PREP IODINE 4OZ

## (undated) DEVICE — SOLUTION SCRUB IODINE 4OZ

## (undated) DEVICE — GUIDEWIRE URO STRT FLEXIBLE TIP .035X150

## (undated) DEVICE — SCRUB BACTOSHIELD 134439

## (undated) DEVICE — TIP BOVIE TEFLON E1450X

## (undated) DEVICE — DRESSING TEGADERM 4X4 3/4 TD1004

## (undated) DEVICE — BRIEF MESH XLARGE GREEN BULK MB5100

## (undated) DEVICE — STRIP PACKING AMD 1/2 7832AMD

## (undated) DEVICE — BASKET STONE 4 WIRE 0 TIP 1.9FX120